# Patient Record
Sex: FEMALE | Race: WHITE | NOT HISPANIC OR LATINO | Employment: FULL TIME | URBAN - METROPOLITAN AREA
[De-identification: names, ages, dates, MRNs, and addresses within clinical notes are randomized per-mention and may not be internally consistent; named-entity substitution may affect disease eponyms.]

---

## 2017-02-11 RX ORDER — LEVOTHYROXINE SODIUM 100 UG/1
TABLET ORAL
Qty: 30 TABLET | Refills: 7 | Status: SHIPPED | OUTPATIENT
Start: 2017-02-11 | End: 2017-10-18 | Stop reason: SDUPTHER

## 2017-04-20 DIAGNOSIS — Z30.9 ENCOUNTER FOR CONTRACEPTIVE MANAGEMENT: ICD-10-CM

## 2017-05-08 ENCOUNTER — PATIENT MESSAGE (OUTPATIENT)
Dept: INTERNAL MEDICINE | Facility: CLINIC | Age: 52
End: 2017-05-08

## 2017-05-08 DIAGNOSIS — Z00.00 ANNUAL PHYSICAL EXAM: Primary | ICD-10-CM

## 2017-05-11 ENCOUNTER — LAB VISIT (OUTPATIENT)
Dept: LAB | Facility: HOSPITAL | Age: 52
End: 2017-05-11
Attending: INTERNAL MEDICINE
Payer: COMMERCIAL

## 2017-05-11 DIAGNOSIS — Z00.00 ANNUAL PHYSICAL EXAM: ICD-10-CM

## 2017-05-11 LAB
ALBUMIN SERPL BCP-MCNC: 3.2 G/DL
ALP SERPL-CCNC: 81 U/L
ALT SERPL W/O P-5'-P-CCNC: 14 U/L
ANION GAP SERPL CALC-SCNC: 10 MMOL/L
AST SERPL-CCNC: 18 U/L
BASOPHILS # BLD AUTO: 0.02 K/UL
BASOPHILS NFR BLD: 0.2 %
BILIRUB SERPL-MCNC: 0.5 MG/DL
BUN SERPL-MCNC: 16 MG/DL
CALCIUM SERPL-MCNC: 9 MG/DL
CHLORIDE SERPL-SCNC: 103 MMOL/L
CHOLEST/HDLC SERPL: 3.9 {RATIO}
CO2 SERPL-SCNC: 23 MMOL/L
CREAT SERPL-MCNC: 1.1 MG/DL
DIFFERENTIAL METHOD: ABNORMAL
EOSINOPHIL # BLD AUTO: 0.1 K/UL
EOSINOPHIL NFR BLD: 1 %
ERYTHROCYTE [DISTWIDTH] IN BLOOD BY AUTOMATED COUNT: 13.3 %
EST. GFR  (AFRICAN AMERICAN): >60 ML/MIN/1.73 M^2
EST. GFR  (NON AFRICAN AMERICAN): 58.3 ML/MIN/1.73 M^2
GLUCOSE SERPL-MCNC: 98 MG/DL
HCT VFR BLD AUTO: 37.3 %
HDL/CHOLESTEROL RATIO: 25.8 %
HDLC SERPL-MCNC: 182 MG/DL
HDLC SERPL-MCNC: 47 MG/DL
HGB BLD-MCNC: 12.2 G/DL
LDLC SERPL CALC-MCNC: 92 MG/DL
LYMPHOCYTES # BLD AUTO: 2.6 K/UL
LYMPHOCYTES NFR BLD: 30.4 %
MCH RBC QN AUTO: 29.5 PG
MCHC RBC AUTO-ENTMCNC: 32.7 %
MCV RBC AUTO: 90 FL
MONOCYTES # BLD AUTO: 0.6 K/UL
MONOCYTES NFR BLD: 6.9 %
NEUTROPHILS # BLD AUTO: 5.3 K/UL
NEUTROPHILS NFR BLD: 61.3 %
NONHDLC SERPL-MCNC: 135 MG/DL
PLATELET # BLD AUTO: 363 K/UL
PMV BLD AUTO: 8.9 FL
POTASSIUM SERPL-SCNC: 4.2 MMOL/L
PROT SERPL-MCNC: 7.6 G/DL
RBC # BLD AUTO: 4.14 M/UL
SODIUM SERPL-SCNC: 136 MMOL/L
T4 FREE SERPL-MCNC: 1.01 NG/DL
TRIGL SERPL-MCNC: 215 MG/DL
TSH SERPL DL<=0.005 MIU/L-ACNC: 4.3 UIU/ML
WBC # BLD AUTO: 8.59 K/UL

## 2017-05-11 PROCEDURE — 36415 COLL VENOUS BLD VENIPUNCTURE: CPT | Mod: PO

## 2017-05-11 PROCEDURE — 85025 COMPLETE CBC W/AUTO DIFF WBC: CPT

## 2017-05-11 PROCEDURE — 84439 ASSAY OF FREE THYROXINE: CPT

## 2017-05-11 PROCEDURE — 80061 LIPID PANEL: CPT

## 2017-05-11 PROCEDURE — 84443 ASSAY THYROID STIM HORMONE: CPT

## 2017-05-11 PROCEDURE — 80053 COMPREHEN METABOLIC PANEL: CPT

## 2017-05-15 ENCOUNTER — OFFICE VISIT (OUTPATIENT)
Dept: INTERNAL MEDICINE | Facility: CLINIC | Age: 52
End: 2017-05-15
Payer: COMMERCIAL

## 2017-05-15 VITALS
SYSTOLIC BLOOD PRESSURE: 136 MMHG | TEMPERATURE: 99 F | RESPIRATION RATE: 16 BRPM | HEART RATE: 92 BPM | WEIGHT: 226.19 LBS | BODY MASS INDEX: 36.35 KG/M2 | DIASTOLIC BLOOD PRESSURE: 84 MMHG | HEIGHT: 66 IN

## 2017-05-15 DIAGNOSIS — G89.29 CHRONIC RIGHT SHOULDER PAIN: ICD-10-CM

## 2017-05-15 DIAGNOSIS — M25.511 CHRONIC RIGHT SHOULDER PAIN: ICD-10-CM

## 2017-05-15 DIAGNOSIS — Z00.00 ANNUAL PHYSICAL EXAM: Primary | ICD-10-CM

## 2017-05-15 PROCEDURE — 99999 PR PBB SHADOW E&M-EST. PATIENT-LVL III: CPT | Mod: PBBFAC,,, | Performed by: INTERNAL MEDICINE

## 2017-05-15 PROCEDURE — 3079F DIAST BP 80-89 MM HG: CPT | Mod: S$GLB,,, | Performed by: INTERNAL MEDICINE

## 2017-05-15 PROCEDURE — 99396 PREV VISIT EST AGE 40-64: CPT | Mod: S$GLB,,, | Performed by: INTERNAL MEDICINE

## 2017-05-15 PROCEDURE — 3075F SYST BP GE 130 - 139MM HG: CPT | Mod: S$GLB,,, | Performed by: INTERNAL MEDICINE

## 2017-05-15 NOTE — PROGRESS NOTES
Subjective:       Patient ID: Mitzi Merlin Preston is a 51 y.o. female.    Chief Complaint: Annual Exam and Shoulder Pain (left shoulder pain)  HISTORY OF PRESENT ILLNESS:  A 51-year-old white female coming in for an annual   review and is feeling fine except for some joint complaints.  The patient sees   also gynecologist, Dr. Diego, an eye doctor, Dr. Nuno.  The patient has   complaints of pain in her right wrist, which she is diagnosed as tendinitis,   left shoulder, which bothers her when she does Pilates.  She has had a prior   fracture of the left wrist.  The patient has not seen Orthopedics about her   problems.  She also complains of sinus congestion.  She has allergies and has   occasional left ear stuffiness.    PHYSICAL EXAMINATION:  VITAL SIGNS:  Normal.  SKIN:  Fair and healthy.  HEENT:  Normal.  NECK:  Shows no adenopathy, thyroid enlargement or bruit.  CHEST:  Clear.  HEART:  There is no murmur or gallop.  ABDOMEN:  Nontender.  No organomegaly.  She is somewhat obese.  EXTREMITIES:  She has tenderness in her left shoulder on movement.  She has a   click.  She does not really have a defined rotator.  The left wrist, she is   tender in the wrist.   The right wrist is the one with the tendinitis and   currently she is not having any problems with that.    IMPRESSION:  1.  Normal physical exam except for her joint problems.  2.  Left shoulder pain, very likely this is some sort of internal disruption.  3.  Prior left wrist fracture with pain associated with that.  4.  Right wrist pain, which she says is tendinitis and appears as probably the   case.  So, I made arrangements for her to see Orthopedics.  She was upset with   her prior orthopedic visit when she had the fracture, wanted to see another   person who is an MD.  The patient had lab done prior to this visit, which was   reviewed with her, showed normal urinalysis, TSH is slightly elevated, otherwise   her lab is normal and her T4 is normal.  I  made no changes, told her we would   check her TSH, T4 the next time I see her.      JDC/HN  dd: 05/21/2017 12:15:42 (CDT)  td: 05/22/2017 02:05:49 (CDT)  Doc ID   #[id number]  Job ID #[id number]    CC:     [facility] [id number]  [facility]  Review of Systems   Constitutional: Negative.    HENT: Negative for congestion, hearing loss, sinus pressure, sneezing, sore throat and voice change.    Eyes: Negative for visual disturbance.   Respiratory: Negative for cough, chest tightness and shortness of breath.    Cardiovascular: Negative.  Negative for chest pain, palpitations and leg swelling.   Gastrointestinal: Negative.    Genitourinary: Negative for difficulty urinating, dysuria, flank pain, frequency, hematuria, menstrual problem, pelvic pain, urgency, vaginal bleeding and vaginal discharge.   Musculoskeletal: Positive for arthralgias and back pain. Negative for neck pain and neck stiffness.   Skin: Negative.    Neurological: Negative.  Negative for dizziness, seizures, light-headedness, numbness and headaches.   Psychiatric/Behavioral: Negative for agitation, behavioral problems, confusion and sleep disturbance. The patient is not nervous/anxious.        Objective:      Physical Exam   Constitutional: She is oriented to person, place, and time. She appears well-developed and well-nourished.   Eyes: EOM are normal. Pupils are equal, round, and reactive to light.   Neck: Normal range of motion. Neck supple. No JVD present. No thyromegaly present.   Cardiovascular: Normal rate, regular rhythm, normal heart sounds and intact distal pulses.  Exam reveals no gallop.    No murmur heard.  Pulmonary/Chest: Breath sounds normal. She has no wheezes. She has no rales.   Abdominal: Soft. Bowel sounds are normal. She exhibits no mass. There is no tenderness.   Musculoskeletal: Normal range of motion. She exhibits tenderness.   Lymphadenopathy:     She has no cervical adenopathy.   Neurological: She is alert and oriented to person, place, and time. She  has normal reflexes. No cranial nerve deficit.   Skin: No rash noted. No erythema.   Psychiatric: She has a normal mood and affect. Judgment normal.       Assessment:       1. Annual physical exam    2. Chronic right shoulder pain        Plan:

## 2017-05-27 ENCOUNTER — PATIENT MESSAGE (OUTPATIENT)
Dept: INTERNAL MEDICINE | Facility: CLINIC | Age: 52
End: 2017-05-27

## 2017-05-30 ENCOUNTER — HOSPITAL ENCOUNTER (OUTPATIENT)
Dept: RADIOLOGY | Facility: HOSPITAL | Age: 52
Discharge: HOME | End: 2017-05-30
Attending: FAMILY MEDICINE
Payer: COMMERCIAL

## 2017-05-30 ENCOUNTER — OFFICE VISIT (OUTPATIENT)
Dept: SPORTS MEDICINE | Facility: CLINIC | Age: 52
End: 2017-05-30
Payer: COMMERCIAL

## 2017-05-30 ENCOUNTER — PATIENT MESSAGE (OUTPATIENT)
Dept: INTERNAL MEDICINE | Facility: CLINIC | Age: 52
End: 2017-05-30

## 2017-05-30 VITALS — BODY MASS INDEX: 36.32 KG/M2 | WEIGHT: 226 LBS | HEIGHT: 66 IN

## 2017-05-30 DIAGNOSIS — M25.512 LEFT SHOULDER PAIN, UNSPECIFIED CHRONICITY: ICD-10-CM

## 2017-05-30 DIAGNOSIS — M12.812 ROTATOR CUFF ARTHROPATHY, LEFT: Primary | ICD-10-CM

## 2017-05-30 DIAGNOSIS — Z00.00 GENERAL MEDICAL EXAM: ICD-10-CM

## 2017-05-30 PROCEDURE — 73030 X-RAY EXAM OF SHOULDER: CPT | Mod: 26,LT,, | Performed by: RADIOLOGY

## 2017-05-30 PROCEDURE — 73030 X-RAY EXAM OF SHOULDER: CPT | Mod: TC,PO,LT

## 2017-05-30 PROCEDURE — 20610 DRAIN/INJ JOINT/BURSA W/O US: CPT | Mod: LT,S$GLB,, | Performed by: FAMILY MEDICINE

## 2017-05-30 PROCEDURE — 99243 OFF/OP CNSLTJ NEW/EST LOW 30: CPT | Mod: 25,S$GLB,, | Performed by: FAMILY MEDICINE

## 2017-05-30 PROCEDURE — 99999 PR PBB SHADOW E&M-EST. PATIENT-LVL III: CPT | Mod: PBBFAC,,, | Performed by: FAMILY MEDICINE

## 2017-05-30 RX ORDER — TRIAMCINOLONE ACETONIDE 40 MG/ML
40 INJECTION, SUSPENSION INTRA-ARTICULAR; INTRAMUSCULAR
Status: DISCONTINUED | OUTPATIENT
Start: 2017-05-30 | End: 2017-05-30 | Stop reason: HOSPADM

## 2017-05-30 RX ADMIN — TRIAMCINOLONE ACETONIDE 40 MG: 40 INJECTION, SUSPENSION INTRA-ARTICULAR; INTRAMUSCULAR at 09:05

## 2017-05-30 NOTE — LETTER
May 30, 2017      Sridhar Rogers MD  [address] [facility]  [place] [address]           [address] [address]  Phone: [phone]          Patient: Mitzi Merlin Preston   MR Number: [id number]   YOB: 1965   Date of Visit: 5/30/2017       Dear Dr. Sridhar Rogers:    Thank you for referring Hodan Hair to me for evaluation. Attached you will find relevant portions of my assessment and plan of care.    If you have questions, please do not hesitate to call me. I look forward to following Hodan Hair along with you.    Sincerely,    Alfonso Sarmiento MD    Enclosure  CC:  No Recipients    If you would like to receive this communication electronically, please contact [email or web address] or [phone] to request more information on [facility] access.    For providers and/or their staff who would like to refer a patient to [facility], please contact us through our one-stop-shop provider referral line, [facility], at [phone].    If you feel you have received this communication in error or would no longer like to receive these types of communications, please e-mail [email or web address]

## 2017-05-30 NOTE — PROGRESS NOTES
Mitzi Merlin Preston, a 51 y.o. female, is here for evaluation of LEFT shoulder pain.     This patient visit is a consult from the following provider: Sridhar Rogers MD  Today's office visit notes will be made available to the consulting/refering provider via the mail, a fax, and/or an in basket message through the electronic medical record.    New patient.     HISTORY OF PRESENT ILLNESS  Hand dominance, right    Location:  anterior and lateral, left  Onset:  Insidious, ~ March 2017   Palliative:     Relative rest   Oral analgesics (NSAIDs)   Ice   Provocative:    ADLs  Prior:  none  Progression:  plateau discomfort  Quality:    Throbbing  Shooting pain   Radiation:  none  Severity:  per nursing documentation  Timing:  intermittent with use  Trauma:  None known     Review of systems (ROS):  A 10+ review of systems was performed with pertinent positives and negatives noted above in the history of present illness. Other systems were negative unless otherwise specified.      PHYSICAL EXAMINATION  General:  The patient is alert and oriented x 3. Mood is pleasant. Observation of ears, eyes and nose reveal no gross abnormalities. HEENT: NCAT, sclera anicteric. Lungs: Respirations are equal and unlabored.     LEFT SHOULDER EXAMINATION     OBSERVATION:     Swelling  none  Deformity  none   Discoloration  none   Scapular winging none   Scars   none  Atrophy  none    TENDERNESS / CREPITUS (T/C):          T/C      T/C   Clavicle   -/-  SUPRAspinatus    -/-     AC Jt.    -/-  INFRAspinatus  -/-    SC Jt.    -/-  Deltoid    -/-      G. Tuberosity  -/-  LH BICEP groove  -/-   Acromion:  -/-  Midline Neck   -/-     Scapular Spine -/-  Trapezium   -/-   SMA Scapula  -/-  GH jt. line - post  -/-     Scapulothoracic  -/-         ROM:     Right shoulder   Left shoulder        AROM (PROM)   AROM (PROM)   FE    170° (175°)     160° (175°) *    ER at 0°    60°  (65°)    60°  (65°)*   ER at 90° ABD  90°  (90°)    90°  (90°)*   IR at 90°   ABD   NA  (40°)     NA  (40°)  *    IR (spine level)   T10     T10*    STRENGTH: (* = with pain) RIGHT SHOULDER  LEFT SHOULDER   SCAPTION   5/5    4/5*    IR    5/5    5/5   ER    5/5    4/5*   BICEPS   5/5    5/5   Deltoid    5/5    5/5     SIGNS:  Painful side       NEER   -   OJULESS        +    WALLACE   +   SPEEDS        +   DROP ARM   -   BELLY PRESS       -    X-Body ADD    +   LIFT-OFF        -   HORNBLOWERS      +              STABILITY TESTING   RIGHT SHOULDER  LEFT SHOULDER     Translation     Anterior up face    up face    Posterior up face   up face    Sulcus  < 10mm   < 10 mm     Signs   Apprehension   neg     neg       Relocation   no change    no change      Jerk test  neg    neg    EXTREMITY NEURO-VASCULAR EXAM    Sensation grossly intact to light touch all dermatomal regions.    DTR 2+ Biceps, Triceps, BR and Negative Kenys sign   Grossly intact motor function at Elbow, Wrist and Hand   Distal pulses radial and ulnar 2+, brisk cap refill, symmetric.      NECK:  Painless FROM and spinous processes non-tender. Negative Spurlings sign.       Other Findings:    ASSESSMENT & PLAN   Assessment:   #1 supraspinatus tendinosis, left  No evidence of osseous pathology  No evidence of neurologic pathology  No evidence of vascular pathology    Imaging studies reviewed:   X-ray shoulder, left 17.05    Plan:  We discussed options including:  #1 watchful waiting  #2 physical therapy aimed at:   RoM glenohumeral joint   Strengthening rotator cuff   Scapular stability  #3 injection therapy:   CSI SAB    Right,     Left,    CSI iaGH    Right,     Left,    orthobiologics   #4 MRI for further evaluation     The patient chooses #3 csi sab left    Pain management: handout given  Bracing:   Physical therapy:   Activity (e.g. sports, work) restrictions: as tolerated   school/vocation: psychologistfaina    We have seen her daughter ([place] do concussion) in the past    Follow up in x w  [place]?  A/e csi sab left  Effective-->hgPT, f/u in 12 w  Ineffective-->MRI shoulder  Should symptoms worsen or fail to resolve, consider:  Revisiting the above options

## 2017-05-30 NOTE — PROCEDURES
Large Joint Aspiration/Injection  Date/Time: 5/30/2017 9:11 AM  Performed by: STEPHANIE BANKS  Authorized by: STEPHANIE BANKS     Consent Done?:  Yes (Verbal)  Indications:  Pain  Procedure site marked: Yes    Timeout: Prior to procedure the correct patient, procedure, and site was verified      Location:  Shoulder  Site:  L subacromial bursa  Prep: Patient was prepped and draped in usual sterile fashion    Ultrasonic Guidance for needle placement: No  Needle size:  22 G  Approach:  Posterior  Medications:  40 mg triamcinolone acetonide 40 mg/mL  Patient tolerance:  Patient tolerated the procedure well with no immediate complications

## 2017-06-19 RX ORDER — AMLODIPINE AND BENAZEPRIL HYDROCHLORIDE 10; 20 MG/1; MG/1
CAPSULE ORAL
Qty: 30 CAPSULE | Refills: 11 | Status: SHIPPED | OUTPATIENT
Start: 2017-06-19 | End: 2018-06-28 | Stop reason: SDUPTHER

## 2017-07-17 ENCOUNTER — OFFICE VISIT (OUTPATIENT)
Dept: SPORTS MEDICINE | Facility: CLINIC | Age: 52
End: 2017-07-17
Payer: COMMERCIAL

## 2017-07-17 VITALS — WEIGHT: 226 LBS | TEMPERATURE: 99 F | BODY MASS INDEX: 36.32 KG/M2 | HEIGHT: 66 IN

## 2017-07-17 DIAGNOSIS — M25.512 CHRONIC LEFT SHOULDER PAIN: Primary | ICD-10-CM

## 2017-07-17 DIAGNOSIS — G89.29 CHRONIC LEFT SHOULDER PAIN: Primary | ICD-10-CM

## 2017-07-17 PROCEDURE — 99214 OFFICE O/P EST MOD 30 MIN: CPT | Mod: S$GLB,,, | Performed by: FAMILY MEDICINE

## 2017-07-17 PROCEDURE — 99999 PR PBB SHADOW E&M-EST. PATIENT-LVL III: CPT | Mod: PBBFAC,,, | Performed by: FAMILY MEDICINE

## 2017-07-17 NOTE — PROGRESS NOTES
Mitzi Merlin Preston, a 51 y.o. female, is here for evaluation of LEFT shoulder pain.     HISTORY OF PRESENT ILLNESS  Hand dominance, right    Location:  anterior and lateral, left  Onset:  Insidious, ~ March 2017   Palliative:     Relative rest   Oral analgesics (NSAIDs)   Ice    CSI, SAB< 05/30/17, good% improvement   Provocative:    ADLs  Prior:  none  Progression:  plateau discomfort  Quality:    Throbbing  Shooting pain   Radiation:  none  Severity:  per nursing documentation  Timing:  intermittent with use  Trauma:  None known     Review of systems (ROS):  A 10+ review of systems was performed with pertinent positives and negatives noted above in the history of present illness. Other systems were negative unless otherwise specified.      PHYSICAL EXAMINATION  General:  The patient is alert and oriented x 3. Mood is pleasant. Observation of ears, eyes and nose reveal no gross abnormalities. HEENT: NCAT, sclera anicteric. Lungs: Respirations are equal and unlabored.     LEFT SHOULDER EXAMINATION     OBSERVATION:     Swelling  none  Deformity  none   Discoloration  none   Scapular winging none   Scars   none  Atrophy  none    TENDERNESS / CREPITUS (T/C):          T/C      T/C   Clavicle   -/-  SUPRAspinatus    -/-     AC Jt.    -/-  INFRAspinatus  -/-    SC Jt.    -/-  Deltoid    -/-      G. Tuberosity  -/-  LH BICEP groove  -/-   Acromion:  -/-  Midline Neck   -/-     Scapular Spine -/-  Trapezium   -/-   SMA Scapula  -/-  GH jt. line - post  -/-     Scapulothoracic  -/-         ROM:     Right shoulder   Left shoulder        AROM (PROM)   AROM (PROM)   FE    170° (175°)     160° (175°) *    ER at 0°    60°  (65°)    60°  (65°)*   ER at 90° ABD  90°  (90°)    90°  (90°)*   IR at 90°  ABD   NA  (40°)     NA  (40°)  *    IR (spine level)   T10     T10*    STRENGTH: (* = with pain) RIGHT SHOULDER  LEFT  SHOULDER   SCAPTION   5/5 4/5*    IR    5/5 5/5   ER    5/5 4/5*   BICEPS   5/5 5/5   Deltoid    5/5 5/5     SIGNS:  Painful side       NEER   -   OJULESS        +    WALLACE   +   SPEEDS        +   DROP ARM   -   BELLY PRESS       -    X-Body ADD    +   LIFT-OFF        -   HORNBLOWERS      +              STABILITY TESTING   RIGHT SHOULDER  LEFT SHOULDER     Translation     Anterior up face    up face    Posterior up face   up face    Sulcus  < 10mm   < 10 mm     Signs   Apprehension   neg     neg       Relocation   no change    no change      Jerk test  neg    neg    EXTREMITY NEURO-VASCULAR EXAM    Sensation grossly intact to light touch all dermatomal regions.    DTR 2+ Biceps, Triceps, BR and Negative Kenys sign   Grossly intact motor function at Elbow, Wrist and Hand   Distal pulses radial and ulnar 2+, brisk cap refill, symmetric.      NECK:  Painless FROM and spinous processes non-tender. Negative Spurlings sign.       Other Findings:    ASSESSMENT & PLAN   Assessment:   #1 concern for glenoid labral tearing, left  No evidence of osseous pathology  No evidence of neurologic pathology  No evidence of vascular pathology    Imaging studies reviewed:   X-ray shoulder, left 17.05    Plan:  Given extreme dysfunction and discomfort, coupled with physical examination suggesting glenoid labral pathology, and with non-diagnostic x-ray imaging, we will obtain MRI arthrogram imaging for further evaluation of the glenoid labrum and associated soft tissue structures of the shoulder.    [We discussed options including:  #1 watchful waiting  #2 physical therapy aimed at:   RoM glenohumeral joint   Strengthening rotator cuff   Scapular stability  #3 injection therapy:   CSI SAB    Right,     Left, effective good%, didn't last long, repeat   CSI iaGH    Right,     Left,    orthobiologics   #4      The patient chooses #]    Pain management: handout given  Bracing:   Physical therapy:   Activity (e.g.  sports, work) restrictions: as tolerated   school/vocation: psychologistfaina    We have seen her daughter (ty concussion) in the past    Follow up after MRIa shoulder left  Should symptoms worsen or fail to resolve, consider:  Revisiting the above options

## 2017-07-24 RX ORDER — EZETIMIBE AND SIMVASTATIN 10; 10 MG/1; MG/1
1 TABLET ORAL NIGHTLY
Qty: 30 TABLET | Refills: 11 | Status: SHIPPED | OUTPATIENT
Start: 2017-07-24 | End: 2018-07-30 | Stop reason: SDUPTHER

## 2017-07-27 ENCOUNTER — TELEPHONE (OUTPATIENT)
Dept: SPORTS MEDICINE | Facility: CLINIC | Age: 52
End: 2017-07-27

## 2017-07-27 NOTE — TELEPHONE ENCOUNTER
----- Message from Beckie Bahena sent at 7/27/2017  1:55 PM CDT -----  Contact: self  Patient ask for a call in regards to rescheduling her MRI Patient can be reach at

## 2017-07-27 NOTE — TELEPHONE ENCOUNTER
Spoke c pt.     Pt reports shoulder is feeling much better after having to sleep on a hotel couch over the weekend. She reports her ROM has increased & Pilates class has been much easier.     Pt would still like to have imaging completed.     R/s MRIa & f/u visit.

## 2017-08-03 ENCOUNTER — TELEPHONE (OUTPATIENT)
Dept: RADIOLOGY | Facility: HOSPITAL | Age: 52
End: 2017-08-03

## 2017-08-04 ENCOUNTER — HOSPITAL ENCOUNTER (OUTPATIENT)
Dept: RADIOLOGY | Facility: HOSPITAL | Age: 52
Discharge: HOME | End: 2017-08-04
Attending: FAMILY MEDICINE
Payer: COMMERCIAL

## 2017-08-04 DIAGNOSIS — M25.512 CHRONIC LEFT SHOULDER PAIN: ICD-10-CM

## 2017-08-04 DIAGNOSIS — G89.29 CHRONIC LEFT SHOULDER PAIN: ICD-10-CM

## 2017-08-04 PROCEDURE — 73222 MRI JOINT UPR EXTREM W/DYE: CPT | Mod: TC,LT

## 2017-08-04 PROCEDURE — 73222 MRI JOINT UPR EXTREM W/DYE: CPT | Mod: 26,LT,, | Performed by: RADIOLOGY

## 2017-08-04 PROCEDURE — 25000003 PHARM REV CODE 250: Performed by: FAMILY MEDICINE

## 2017-08-04 PROCEDURE — 73040 CONTRAST X-RAY OF SHOULDER: CPT | Mod: 26,,, | Performed by: RADIOLOGY

## 2017-08-04 PROCEDURE — A9585 GADOBUTROL INJECTION: HCPCS | Performed by: FAMILY MEDICINE

## 2017-08-04 PROCEDURE — 25500020 PHARM REV CODE 255: Performed by: FAMILY MEDICINE

## 2017-08-04 PROCEDURE — 23350 INJECTION FOR SHOULDER X-RAY: CPT | Mod: TC

## 2017-08-04 PROCEDURE — 23350 INJECTION FOR SHOULDER X-RAY: CPT | Mod: ,,, | Performed by: RADIOLOGY

## 2017-08-04 RX ORDER — GADOBUTROL 604.72 MG/ML
1 INJECTION INTRAVENOUS
Status: COMPLETED | OUTPATIENT
Start: 2017-08-04 | End: 2017-08-04

## 2017-08-04 RX ORDER — LIDOCAINE HYDROCHLORIDE 10 MG/ML
1.5 INJECTION INFILTRATION; PERINEURAL ONCE
Status: COMPLETED | OUTPATIENT
Start: 2017-08-04 | End: 2017-08-04

## 2017-08-04 RX ADMIN — IOHEXOL 8 ML: 300 INJECTION, SOLUTION INTRAVENOUS at 03:08

## 2017-08-04 RX ADMIN — GADOBUTROL 1 ML: 604.72 INJECTION INTRAVENOUS at 03:08

## 2017-08-04 RX ADMIN — LIDOCAINE HYDROCHLORIDE 1.5 ML: 10 INJECTION, SOLUTION INFILTRATION; PERINEURAL at 03:08

## 2017-08-07 ENCOUNTER — OFFICE VISIT (OUTPATIENT)
Dept: SPORTS MEDICINE | Facility: CLINIC | Age: 52
End: 2017-08-07
Payer: COMMERCIAL

## 2017-08-07 VITALS — WEIGHT: 226 LBS | HEIGHT: 66 IN | TEMPERATURE: 99 F | BODY MASS INDEX: 36.32 KG/M2

## 2017-08-07 DIAGNOSIS — M12.812 ROTATOR CUFF ARTHROPATHY, LEFT: Primary | ICD-10-CM

## 2017-08-07 PROCEDURE — 97110 THERAPEUTIC EXERCISES: CPT | Mod: S$GLB,,, | Performed by: FAMILY MEDICINE

## 2017-08-07 PROCEDURE — 99214 OFFICE O/P EST MOD 30 MIN: CPT | Mod: 25,S$GLB,, | Performed by: FAMILY MEDICINE

## 2017-08-07 PROCEDURE — 99999 PR PBB SHADOW E&M-EST. PATIENT-LVL III: CPT | Mod: PBBFAC,,, | Performed by: FAMILY MEDICINE

## 2017-08-07 PROCEDURE — 3008F BODY MASS INDEX DOCD: CPT | Mod: S$GLB,,, | Performed by: FAMILY MEDICINE

## 2017-08-07 NOTE — PROGRESS NOTES
Mitzi Merlin Preston, a 51 y.o. female, is here for evaluation of LEFT shoulder pain.     HISTORY OF PRESENT ILLNESS  Hand dominance, right    Location:  anterior and lateral, left  Onset:  Insidious, ~ March 2017   Palliative:     Relative rest   Oral analgesics (NSAIDs)   Ice    CSI, SAB< 05/30/17, good% improvement   Provocative:    ADLs  Prior:  none  Progression:  plateau discomfort  Quality:    Throbbing  Shooting pain   Radiation:  none  Severity:  per nursing documentation  Timing:  intermittent with use  Trauma:  None known     Review of systems (ROS):  A 10+ review of systems was performed with pertinent positives and negatives noted above in the history of present illness. Other systems were negative unless otherwise specified.      PHYSICAL EXAMINATION  General:  The patient is alert and oriented x 3. Mood is pleasant. Observation of ears, eyes and nose reveal no gross abnormalities. HEENT: NCAT, sclera anicteric. Lungs: Respirations are equal and unlabored.     LEFT SHOULDER EXAMINATION     OBSERVATION:     Swelling  none  Deformity  none   Discoloration  none   Scapular winging none   Scars   none  Atrophy  none    TENDERNESS / CREPITUS (T/C):          T/C      T/C   Clavicle   -/-  SUPRAspinatus    -/-     AC Jt.    -/-  INFRAspinatus  -/-    SC Jt.    -/-  Deltoid    -/-      G. Tuberosity  -/-  LH BICEP groove  -/-   Acromion:  -/-  Midline Neck   -/-     Scapular Spine -/-  Trapezium   -/-   SMA Scapula  -/-  GH jt. line - post  -/-     Scapulothoracic  -/-         ROM:     Right shoulder   Left shoulder        AROM (PROM)   AROM (PROM)   FE    170° (175°)     160° (175°) *    ER at 0°    60°  (65°)    60°  (65°)*   ER at 90° ABD  90°  (90°)    90°  (90°)*   IR at 90°  ABD   NA  (40°)     NA  (40°)  *    IR (spine level)   T10     T10*    STRENGTH: (* = with pain) RIGHT SHOULDER  LEFT  SHOULDER   SCAPTION   5/5 4/5*    IR    5/5 5/5   ER    5/5 4/5*   BICEPS   5/5 5/5   Deltoid    5/5 5/5     SIGNS:  Painful side       NEER   -   OJULESS        +    WALLACE   +   SPEEDS        +   DROP ARM   -   BELLY PRESS       -    X-Body ADD    +   LIFT-OFF        -   HORNBLOWERS      +              STABILITY TESTING   RIGHT SHOULDER  LEFT SHOULDER     Translation     Anterior up face    up face    Posterior up face   up face    Sulcus  < 10mm   < 10 mm     Signs   Apprehension   neg     neg       Relocation   no change    no change      Jerk test  neg    neg    EXTREMITY NEURO-VASCULAR EXAM    Sensation grossly intact to light touch all dermatomal regions.    DTR 2+ Biceps, Triceps, BR and Negative Kenys sign   Grossly intact motor function at Elbow, Wrist and Hand   Distal pulses radial and ulnar 2+, brisk cap refill, symmetric.      NECK:  Painless FROM and spinous processes non-tender. Negative Spurlings sign.       Other Findings:    ASSESSMENT & PLAN   Assessment:   #1 supraspinatus tendinosis, left    No evidence of osseous pathology  No evidence of neurologic pathology  No evidence of vascular pathology    Imaging studies reviewed:   X-ray shoulder, left 17.05  MRIa shoulder, left 17.08    Plan:  We discussed options including:  #1 watchful waiting  #2 physical therapy aimed at:   RoM glenohumeral joint   Strengthening rotator cuff   Scapular stability  #3 injection therapy:   CSI SAB    Right,     Left, effective good%, didn't last long, repeat   CSI iaGH    Right,     Left,    orthobiologics   #4 consultation      The patient chooses #2    Pain management: handout given  Bracing:   Physical therapy: hgPT, handout given, begin as above  Activity (e.g. sports, work) restrictions: as tolerated   school/vocation: psychologistfaina    We have seen her daughter (ty concussion) in the past    Also the daughter of a physician    Follow up per pt  A/e hgPT  Should symptoms  "worsen or fail to resolve, consider:  Revisiting the above options    [address]246 HOME EXERCISE PROGRAM (HEP):  The patient was taught a homegoing physical therapy regimen as described above and based on the appropriate chapter of "The Sports Medicine Patient Advisor," by Ranjan Mason, b3991. The patient demonstrated understanding of the exercises and proper technique of their execution. This interaction took 15 minutes.       "

## 2017-10-22 RX ORDER — LEVOTHYROXINE SODIUM 100 UG/1
TABLET ORAL
Qty: 30 TABLET | Refills: 7 | Status: SHIPPED | OUTPATIENT
Start: 2017-10-22 | End: 2018-06-17 | Stop reason: SDUPTHER

## 2017-10-23 ENCOUNTER — PATIENT MESSAGE (OUTPATIENT)
Dept: INTERNAL MEDICINE | Facility: CLINIC | Age: 52
End: 2017-10-23

## 2017-11-01 ENCOUNTER — OFFICE VISIT (OUTPATIENT)
Dept: OBSTETRICS AND GYNECOLOGY | Facility: CLINIC | Age: 52
End: 2017-11-01
Payer: COMMERCIAL

## 2017-11-01 VITALS
SYSTOLIC BLOOD PRESSURE: 150 MMHG | BODY MASS INDEX: 36.95 KG/M2 | DIASTOLIC BLOOD PRESSURE: 80 MMHG | HEIGHT: 66 IN | WEIGHT: 229.94 LBS

## 2017-11-01 DIAGNOSIS — Z01.419 WELL WOMAN EXAM WITH ROUTINE GYNECOLOGICAL EXAM: Primary | ICD-10-CM

## 2017-11-01 DIAGNOSIS — N95.1 PERIMENOPAUSE: ICD-10-CM

## 2017-11-01 PROCEDURE — 99396 PREV VISIT EST AGE 40-64: CPT | Mod: S$GLB,,, | Performed by: OBSTETRICS & GYNECOLOGY

## 2017-11-01 PROCEDURE — 99999 PR PBB SHADOW E&M-EST. PATIENT-LVL III: CPT | Mod: PBBFAC,,, | Performed by: OBSTETRICS & GYNECOLOGY

## 2017-11-01 PROCEDURE — 88175 CYTOPATH C/V AUTO FLUID REDO: CPT

## 2017-11-01 RX ORDER — CLONIDINE HYDROCHLORIDE 0.2 MG/1
TABLET ORAL
Qty: 30 TABLET | Refills: 11 | Status: SHIPPED | OUTPATIENT
Start: 2017-11-01 | End: 2018-07-30

## 2017-11-01 NOTE — PROGRESS NOTES
Subjective:       Patient ID: Mitzi Merlin Preston is a 52 y.o. female.    Chief Complaint: Gynecologic Exam (she had an ablation and she has been having brownish blood and one month one day of blood.  the last two month no bleeding, but she has symptoms of a cycle.)    Perimenopause: Takes necon 1/50---will reduce to 1/35 to see of spotting decreases---next option is activella. Labs done 1 year ago  Has some vasomotor symptoms, mostly night sweats  Clonidine if OK with medical MD  Weight concerns---consider Contrave if OK with psychiatrist    HPI  Review of Systems   Gastrointestinal: Negative for abdominal distention, abdominal pain, constipation and nausea.   Genitourinary: Negative for dyspareunia, dysuria, genital sores, pelvic pain, vaginal bleeding and vaginal discharge.       Objective:      Physical Exam   Constitutional: She appears well-developed and well-nourished.   Pulmonary/Chest: Right breast exhibits no mass, no nipple discharge, no skin change and no tenderness. Left breast exhibits no mass, no nipple discharge, no skin change and no tenderness.   Abdominal: Soft. Bowel sounds are normal. She exhibits no distension and no mass. There is no tenderness. There is no rebound and no guarding. Hernia confirmed negative in the right inguinal area and confirmed negative in the left inguinal area.   Genitourinary: Rectum normal, vagina normal and uterus normal. No breast tenderness or discharge. There is no lesion on the right labia. There is no lesion on the left labia. Uterus is not fixed and not tender. Cervix exhibits no motion tenderness, no discharge and no friability. Right adnexum displays no mass, no tenderness and no fullness. Left adnexum displays no mass, no tenderness and no fullness. No tenderness in the vagina. No vaginal discharge found.   Lymphadenopathy:        Right: No inguinal adenopathy present.        Left: No inguinal adenopathy present.       Assessment:       1. Well woman exam  with routine gynecological exam    2. BMI 37.0-37.9, adult        Plan:         annual gyn visit; pap and mammogram; continue OCP

## 2017-11-08 ENCOUNTER — TELEPHONE (OUTPATIENT)
Dept: OBSTETRICS AND GYNECOLOGY | Facility: CLINIC | Age: 52
End: 2017-11-08

## 2017-11-13 ENCOUNTER — PATIENT MESSAGE (OUTPATIENT)
Dept: ADMINISTRATIVE | Facility: OTHER | Age: 52
End: 2017-11-13

## 2018-01-05 RX ORDER — TIZANIDINE HYDROCHLORIDE 4 MG/1
CAPSULE, GELATIN COATED ORAL
Qty: 60 CAPSULE | Refills: 12 | Status: SHIPPED | OUTPATIENT
Start: 2018-01-05 | End: 2018-07-30 | Stop reason: SDUPTHER

## 2018-02-19 ENCOUNTER — PATIENT MESSAGE (OUTPATIENT)
Dept: OBSTETRICS AND GYNECOLOGY | Facility: CLINIC | Age: 53
End: 2018-02-19

## 2018-02-20 ENCOUNTER — TELEPHONE (OUTPATIENT)
Dept: OBSTETRICS AND GYNECOLOGY | Facility: CLINIC | Age: 53
End: 2018-02-20

## 2018-02-20 NOTE — TELEPHONE ENCOUNTER
Dr. Diego,     For about the last 3 months I have been feeling distracted and unmotivated.  The only medication changes have been the addition of the Clonidine and the change in birth control.  I saw my psychiatrist in December and asked him if he could increase my ADHD medication.  He said doing so could make me more anxious.  Recently I participated in a webinar series for women with ADHD.  In a couple of the lectures, the speakers talked about the relationship between estrogen and ADHD symptoms.  Do you think that the reduction of my birth control pills could be the reason for my cloudy thoughts, lack of motivation and clumsiness I have been experiencing?     Hodan Hair

## 2018-02-21 ENCOUNTER — PATIENT MESSAGE (OUTPATIENT)
Dept: OBSTETRICS AND GYNECOLOGY | Facility: CLINIC | Age: 53
End: 2018-02-21

## 2018-02-21 NOTE — TELEPHONE ENCOUNTER
The patient know that the medication change could possibly effect anything else on its own or it could affect other medications.  She can stop the clonidine for now and see if things are better after a couple of weeks.  Also, we can increase her birth control pill strength to her previous dose either now or after seeing if the discontinuation of clonidine makes a difference.

## 2018-06-17 DIAGNOSIS — Z00.00 ANNUAL PHYSICAL EXAM: Primary | ICD-10-CM

## 2018-06-18 RX ORDER — LEVOTHYROXINE SODIUM 100 UG/1
TABLET ORAL
Qty: 30 TABLET | Refills: 0 | Status: SHIPPED | OUTPATIENT
Start: 2018-06-18 | End: 2018-07-30 | Stop reason: SDUPTHER

## 2018-06-28 RX ORDER — AMLODIPINE AND BENAZEPRIL HYDROCHLORIDE 10; 20 MG/1; MG/1
CAPSULE ORAL
Qty: 30 CAPSULE | Refills: 11 | Status: SHIPPED | OUTPATIENT
Start: 2018-06-28 | End: 2018-07-30

## 2018-07-16 ENCOUNTER — PATIENT MESSAGE (OUTPATIENT)
Dept: INTERNAL MEDICINE | Facility: CLINIC | Age: 53
End: 2018-07-16

## 2018-07-19 ENCOUNTER — PATIENT MESSAGE (OUTPATIENT)
Dept: INTERNAL MEDICINE | Facility: CLINIC | Age: 53
End: 2018-07-19

## 2018-07-26 ENCOUNTER — HOSPITAL ENCOUNTER (OUTPATIENT)
Dept: RADIOLOGY | Facility: HOSPITAL | Age: 53
Discharge: HOME | End: 2018-07-26
Attending: OBSTETRICS & GYNECOLOGY
Payer: COMMERCIAL

## 2018-07-26 VITALS — BODY MASS INDEX: 36.96 KG/M2 | HEIGHT: 66 IN | WEIGHT: 230 LBS

## 2018-07-26 DIAGNOSIS — Z12.39 BREAST CANCER SCREENING: ICD-10-CM

## 2018-07-26 DIAGNOSIS — Z01.419 WELL WOMAN EXAM WITH ROUTINE GYNECOLOGICAL EXAM: ICD-10-CM

## 2018-07-26 PROCEDURE — 77063 BREAST TOMOSYNTHESIS BI: CPT | Mod: 26,,, | Performed by: RADIOLOGY

## 2018-07-26 PROCEDURE — 77063 BREAST TOMOSYNTHESIS BI: CPT | Mod: TC,PO

## 2018-07-26 PROCEDURE — 77067 SCR MAMMO BI INCL CAD: CPT | Mod: 26,,, | Performed by: RADIOLOGY

## 2018-07-27 ENCOUNTER — LAB VISIT (OUTPATIENT)
Dept: LAB | Facility: HOSPITAL | Age: 53
End: 2018-07-27
Attending: INTERNAL MEDICINE
Payer: COMMERCIAL

## 2018-07-27 DIAGNOSIS — Z00.00 ANNUAL PHYSICAL EXAM: ICD-10-CM

## 2018-07-27 LAB
ALBUMIN SERPL BCP-MCNC: 3.6 G/DL
ALP SERPL-CCNC: 97 U/L
ALT SERPL W/O P-5'-P-CCNC: 20 U/L
ANION GAP SERPL CALC-SCNC: 11 MMOL/L
AST SERPL-CCNC: 23 U/L
BASOPHILS # BLD AUTO: 0.03 K/UL
BASOPHILS NFR BLD: 0.4 %
BILIRUB SERPL-MCNC: 0.5 MG/DL
BUN SERPL-MCNC: 17 MG/DL
CALCIUM SERPL-MCNC: 9.5 MG/DL
CHLORIDE SERPL-SCNC: 102 MMOL/L
CHOLEST SERPL-MCNC: 219 MG/DL
CHOLEST/HDLC SERPL: 3.7 {RATIO}
CO2 SERPL-SCNC: 24 MMOL/L
CREAT SERPL-MCNC: 1 MG/DL
DIFFERENTIAL METHOD: ABNORMAL
EOSINOPHIL # BLD AUTO: 0 K/UL
EOSINOPHIL NFR BLD: 0.2 %
ERYTHROCYTE [DISTWIDTH] IN BLOOD BY AUTOMATED COUNT: 13.3 %
EST. GFR  (AFRICAN AMERICAN): >60 ML/MIN/1.73 M^2
EST. GFR  (NON AFRICAN AMERICAN): >60 ML/MIN/1.73 M^2
GLUCOSE SERPL-MCNC: 102 MG/DL
HCT VFR BLD AUTO: 40.2 %
HDLC SERPL-MCNC: 59 MG/DL
HDLC SERPL: 26.9 %
HGB BLD-MCNC: 12.7 G/DL
IMM GRANULOCYTES # BLD AUTO: 0.02 K/UL
IMM GRANULOCYTES NFR BLD AUTO: 0.2 %
LDLC SERPL CALC-MCNC: 126.2 MG/DL
LYMPHOCYTES # BLD AUTO: 3.1 K/UL
LYMPHOCYTES NFR BLD: 36.7 %
MCH RBC QN AUTO: 28.9 PG
MCHC RBC AUTO-ENTMCNC: 31.6 G/DL
MCV RBC AUTO: 91 FL
MONOCYTES # BLD AUTO: 0.6 K/UL
MONOCYTES NFR BLD: 6.9 %
NEUTROPHILS # BLD AUTO: 4.8 K/UL
NEUTROPHILS NFR BLD: 55.6 %
NONHDLC SERPL-MCNC: 160 MG/DL
NRBC BLD-RTO: 0 /100 WBC
PLATELET # BLD AUTO: 392 K/UL
PMV BLD AUTO: 8.7 FL
POTASSIUM SERPL-SCNC: 4.4 MMOL/L
PROT SERPL-MCNC: 7.9 G/DL
RBC # BLD AUTO: 4.4 M/UL
SODIUM SERPL-SCNC: 137 MMOL/L
T4 FREE SERPL-MCNC: 1.1 NG/DL
TRIGL SERPL-MCNC: 169 MG/DL
TSH SERPL DL<=0.005 MIU/L-ACNC: 5.14 UIU/ML
WBC # BLD AUTO: 8.54 K/UL

## 2018-07-27 PROCEDURE — 84443 ASSAY THYROID STIM HORMONE: CPT

## 2018-07-27 PROCEDURE — 80061 LIPID PANEL: CPT

## 2018-07-27 PROCEDURE — 80053 COMPREHEN METABOLIC PANEL: CPT

## 2018-07-27 PROCEDURE — 36415 COLL VENOUS BLD VENIPUNCTURE: CPT | Mod: PO

## 2018-07-27 PROCEDURE — 85025 COMPLETE CBC W/AUTO DIFF WBC: CPT

## 2018-07-27 PROCEDURE — 84439 ASSAY OF FREE THYROXINE: CPT

## 2018-07-30 ENCOUNTER — OFFICE VISIT (OUTPATIENT)
Dept: INTERNAL MEDICINE | Facility: CLINIC | Age: 53
End: 2018-07-30
Payer: COMMERCIAL

## 2018-07-30 ENCOUNTER — LAB VISIT (OUTPATIENT)
Dept: LAB | Facility: HOSPITAL | Age: 53
End: 2018-07-30
Attending: INTERNAL MEDICINE
Payer: COMMERCIAL

## 2018-07-30 VITALS
DIASTOLIC BLOOD PRESSURE: 90 MMHG | HEIGHT: 66 IN | SYSTOLIC BLOOD PRESSURE: 180 MMHG | BODY MASS INDEX: 38.44 KG/M2 | WEIGHT: 239.19 LBS | TEMPERATURE: 100 F | RESPIRATION RATE: 18 BRPM | HEART RATE: 100 BPM

## 2018-07-30 DIAGNOSIS — Z00.00 ANNUAL PHYSICAL EXAM: ICD-10-CM

## 2018-07-30 DIAGNOSIS — G47.00 INSOMNIA, UNSPECIFIED TYPE: ICD-10-CM

## 2018-07-30 DIAGNOSIS — I10 ESSENTIAL HYPERTENSION: Primary | ICD-10-CM

## 2018-07-30 DIAGNOSIS — J30.89 ALLERGIC RHINITIS CAUSED BY MOLD: ICD-10-CM

## 2018-07-30 DIAGNOSIS — R82.90 ABNORMAL URINALYSIS: ICD-10-CM

## 2018-07-30 PROCEDURE — 3077F SYST BP >= 140 MM HG: CPT | Mod: CPTII,S$GLB,, | Performed by: INTERNAL MEDICINE

## 2018-07-30 PROCEDURE — 3080F DIAST BP >= 90 MM HG: CPT | Mod: CPTII,S$GLB,, | Performed by: INTERNAL MEDICINE

## 2018-07-30 PROCEDURE — 99396 PREV VISIT EST AGE 40-64: CPT | Mod: S$GLB,,, | Performed by: INTERNAL MEDICINE

## 2018-07-30 PROCEDURE — 99999 PR PBB SHADOW E&M-EST. PATIENT-LVL III: CPT | Mod: PBBFAC,,, | Performed by: INTERNAL MEDICINE

## 2018-07-30 PROCEDURE — 87086 URINE CULTURE/COLONY COUNT: CPT

## 2018-07-30 RX ORDER — AMLODIPINE AND BENAZEPRIL HYDROCHLORIDE 10; 40 MG/1; MG/1
1 CAPSULE ORAL DAILY
Qty: 90 CAPSULE | Refills: 3 | Status: SHIPPED | OUTPATIENT
Start: 2018-07-30 | End: 2019-07-16

## 2018-07-30 RX ORDER — PROMETHAZINE HYDROCHLORIDE AND CODEINE PHOSPHATE 6.25; 1 MG/5ML; MG/5ML
SOLUTION ORAL
Refills: 0 | COMMUNITY
Start: 2018-07-02 | End: 2019-03-21 | Stop reason: ALTCHOICE

## 2018-07-30 RX ORDER — TIZANIDINE HYDROCHLORIDE 4 MG/1
2 CAPSULE, GELATIN COATED ORAL NIGHTLY
Qty: 180 CAPSULE | Refills: 3 | Status: SHIPPED | OUTPATIENT
Start: 2018-07-30 | End: 2019-09-13 | Stop reason: SDUPTHER

## 2018-07-30 RX ORDER — LEVOTHYROXINE SODIUM 112 UG/1
112 TABLET ORAL DAILY
Qty: 90 TABLET | Refills: 3 | Status: SHIPPED | OUTPATIENT
Start: 2018-07-30 | End: 2019-07-16 | Stop reason: SDUPTHER

## 2018-07-30 RX ORDER — PREDNISONE 20 MG/1
TABLET ORAL
Refills: 0 | COMMUNITY
Start: 2018-07-02 | End: 2019-09-16

## 2018-07-30 RX ORDER — LISDEXAMFETAMINE DIMESYLATE 50 MG/1
CAPSULE ORAL
Refills: 0 | COMMUNITY
Start: 2018-07-11 | End: 2022-07-15 | Stop reason: DRUGHIGH

## 2018-07-30 RX ORDER — EZETIMIBE AND SIMVASTATIN 10; 10 MG/1; MG/1
1 TABLET ORAL NIGHTLY
Qty: 90 TABLET | Refills: 3 | Status: SHIPPED | OUTPATIENT
Start: 2018-07-30 | End: 2019-07-16 | Stop reason: SDUPTHER

## 2018-07-31 NOTE — PROGRESS NOTES
Subjective:       Patient ID: Mitzi Merlin Preston is a 52 y.o. female.    Chief Complaint: Annual Exam  HISTORY OF PRESENT ILLNESS:  A 52-year-old white female patient well known to   me, coming in for annual exam.  The patient has been living in a house that has   apparently got mold in the air conditioning system and she is having allergic   reactions both skin and sinus.  She also complains over the last three months   having problems with her right thumb near the PIP joint.  She has a bird, which   is a Macaw that apparently bit her there she thinks.  She saw a friend who I   believe is an orthopedist who told her it was not any orthopedic problem.  She   sees gynecologist Johnathon, psychiatrist whose name I do not have and [facility] for   ENT.  The patient came in, her blood pressure was hypertensive.  She said that   when she has been to her psychiatrist, it is generally high there.  She has not   monitored it at home.  She is on a hypertensive medication.  She has no symptoms   related to it.    PHYSICAL EXAMINATION:  VITAL SIGNS:  Normal except for her elevated blood pressure, it was 180/90,   repeat 178/88.  SKIN:  Fair.  The area by her first PIP joint on her right thumb is actually a   skin abnormality that blanches with pressure.  It appears to be a little bit   concave.  There is no inflammatory or substance to it and I presume it is an   injury.  I do not see anything deeper than that.  HEENT:  Shows nasal congestion.  No other abnormality.  NECK:  She is tender in the anterior cervical node area and she actually has   some adenopathy on both sides, more prominent on the left than the right, but   her oral cavity and throat looked alright.  I told her I presume that is from   the mold contamination in her house and her sinus problems related to it.  She   has no evidence of a bacterial infection, however.  CHEST:  Clear.  HEART:  There is no murmur or gallop.  ABDOMEN:  Nontender without any  organomegaly.  EXTREMITIES:  Show normal muscles, joints, pulses.  NEUROLOGIC:  Normal.    IMPRESSION:  1.  Hypertension.  I have increased her benazepril-amlodipine combination.  2.  She had an elevated TSH on her blood work, felt no thyroid enlargement.  I   increased her dose of Synthroid to 0.112 and we will recheck that in a few   months.  3.  The injury she has to her first PIP joint on the right hand appears to be   just a skin injury with no substance.  4.  Cervical adenopathy, probably related to her air conditioning contamination   sinus allergy.  5.  Sleep problems.  6.  Psychiatric counseling on medication.  I will see her again in one year.      JMILLIE/HN  dd: 07/30/2018 23:27:41 (CDT)  td: 07/31/2018 11:56:49 (CDT)  Doc ID   #[id number]  Job ID #[id number]    CC:     [facility] [id number]  HPI  Review of Systems   Constitutional: Negative.  Negative for activity change and unexpected weight change.   HENT: Positive for rhinorrhea. Negative for congestion, hearing loss, sinus pressure, sneezing, sore throat, trouble swallowing and voice change.    Eyes: Negative for discharge and visual disturbance.   Respiratory: Positive for wheezing. Negative for cough, chest tightness and shortness of breath.    Cardiovascular: Negative.  Negative for chest pain, palpitations and leg swelling.   Gastrointestinal: Negative.  Negative for blood in stool, constipation, diarrhea and vomiting.   Endocrine: Positive for polyuria. Negative for polydipsia.   Genitourinary: Negative for difficulty urinating, dysuria, flank pain, frequency, hematuria, menstrual problem, pelvic pain, urgency, vaginal bleeding and vaginal discharge.   Musculoskeletal: Positive for arthralgias. Negative for joint swelling, neck pain and neck stiffness.   Skin: Negative.    Neurological: Positive for headaches. Negative for dizziness, seizures, weakness, light-headedness and numbness.   Psychiatric/Behavioral: Negative for agitation, behavioral problems, confusion,  dysphoric mood and sleep disturbance. The patient is not nervous/anxious.        Objective:      Physical Exam   Constitutional: She is oriented to person, place, and time. She appears well-developed and well-nourished.   Eyes: EOM are normal. Pupils are equal, round, and reactive to light.   Neck: Normal range of motion. Neck supple. No JVD present. No thyromegaly present.   Cardiovascular: Normal rate, regular rhythm, normal heart sounds and intact distal pulses.  Exam reveals no gallop.    No murmur heard.  Pulmonary/Chest: Breath sounds normal. She has no wheezes. She has no rales.   Abdominal: Soft. Bowel sounds are normal. She exhibits no mass. There is no tenderness.   Musculoskeletal: Normal range of motion.   Lymphadenopathy:     She has no cervical adenopathy.   Neurological: She is alert and oriented to person, place, and time. She has normal reflexes. No cranial nerve deficit.   Skin: No rash noted. No erythema.   Psychiatric: She has a normal mood and affect. Judgment normal.       Assessment:       1. Essential hypertension    2. Annual physical exam    3. Allergic rhinitis caused by mold    4. Insomnia, unspecified type    5. Abnormal urinalysis        Plan:

## 2018-08-01 LAB
BACTERIA UR CULT: NORMAL
BACTERIA UR CULT: NORMAL

## 2018-08-02 ENCOUNTER — TELEPHONE (OUTPATIENT)
Dept: INTERNAL MEDICINE | Facility: CLINIC | Age: 53
End: 2018-08-02

## 2018-09-28 RX ORDER — NORETHINDRONE AND ETHINYL ESTRADIOL 1 MG-35MCG
KIT ORAL
Qty: 28 TABLET | Refills: 11 | Status: SHIPPED | OUTPATIENT
Start: 2018-09-28 | End: 2019-09-01 | Stop reason: SDUPTHER

## 2018-10-31 ENCOUNTER — PES CALL (OUTPATIENT)
Dept: ADMINISTRATIVE | Facility: CLINIC | Age: 53
End: 2018-10-31

## 2019-03-21 ENCOUNTER — TELEPHONE (OUTPATIENT)
Dept: INTERNAL MEDICINE | Facility: CLINIC | Age: 54
End: 2019-03-21

## 2019-03-21 ENCOUNTER — OFFICE VISIT (OUTPATIENT)
Dept: INTERNAL MEDICINE | Facility: CLINIC | Age: 54
End: 2019-03-21
Payer: COMMERCIAL

## 2019-03-21 VITALS
SYSTOLIC BLOOD PRESSURE: 140 MMHG | WEIGHT: 237 LBS | HEIGHT: 66 IN | DIASTOLIC BLOOD PRESSURE: 64 MMHG | BODY MASS INDEX: 38.09 KG/M2 | HEART RATE: 100 BPM | TEMPERATURE: 101 F

## 2019-03-21 DIAGNOSIS — J11.1 FLU SYNDROME: Primary | ICD-10-CM

## 2019-03-21 LAB
INFLUENZA A, MOLECULAR: POSITIVE
INFLUENZA B, MOLECULAR: NEGATIVE
SPECIMEN SOURCE: ABNORMAL

## 2019-03-21 PROCEDURE — 3077F PR MOST RECENT SYSTOLIC BLOOD PRESSURE >= 140 MM HG: ICD-10-PCS | Mod: CPTII,S$GLB,, | Performed by: INTERNAL MEDICINE

## 2019-03-21 PROCEDURE — 3008F BODY MASS INDEX DOCD: CPT | Mod: CPTII,S$GLB,, | Performed by: INTERNAL MEDICINE

## 2019-03-21 PROCEDURE — 99214 OFFICE O/P EST MOD 30 MIN: CPT | Mod: S$GLB,,, | Performed by: INTERNAL MEDICINE

## 2019-03-21 PROCEDURE — 87502 INFLUENZA DNA AMP PROBE: CPT

## 2019-03-21 PROCEDURE — 99999 PR PBB SHADOW E&M-EST. PATIENT-LVL III: ICD-10-PCS | Mod: PBBFAC,,, | Performed by: INTERNAL MEDICINE

## 2019-03-21 PROCEDURE — 3078F PR MOST RECENT DIASTOLIC BLOOD PRESSURE < 80 MM HG: ICD-10-PCS | Mod: CPTII,S$GLB,, | Performed by: INTERNAL MEDICINE

## 2019-03-21 PROCEDURE — 3078F DIAST BP <80 MM HG: CPT | Mod: CPTII,S$GLB,, | Performed by: INTERNAL MEDICINE

## 2019-03-21 PROCEDURE — 99999 PR PBB SHADOW E&M-EST. PATIENT-LVL III: CPT | Mod: PBBFAC,,, | Performed by: INTERNAL MEDICINE

## 2019-03-21 PROCEDURE — 3077F SYST BP >= 140 MM HG: CPT | Mod: CPTII,S$GLB,, | Performed by: INTERNAL MEDICINE

## 2019-03-21 PROCEDURE — 99214 PR OFFICE/OUTPT VISIT, EST, LEVL IV, 30-39 MIN: ICD-10-PCS | Mod: S$GLB,,, | Performed by: INTERNAL MEDICINE

## 2019-03-21 PROCEDURE — 3008F PR BODY MASS INDEX (BMI) DOCUMENTED: ICD-10-PCS | Mod: CPTII,S$GLB,, | Performed by: INTERNAL MEDICINE

## 2019-03-21 RX ORDER — OSELTAMIVIR PHOSPHATE 75 MG/1
CAPSULE ORAL
COMMUNITY
Start: 2019-03-20 | End: 2019-07-16

## 2019-03-21 NOTE — TELEPHONE ENCOUNTER
I called her with results . Positive flu.    Her dad rx's tamilflu 75mg qd #10.    Dr carrington told me to tell her bid for 5 days. She just started this am.  I also told her fluids, tylenol or advil prn and light meals, soup good.    dr carrington heard conversation and agreed with all.

## 2019-03-21 NOTE — PROGRESS NOTES
Subjective:       Patient ID: Mitzi Merlin Preston is a 53 y.o. female.    Chief Complaint: Cough; Excessive Sweating; Generalized Body Aches; Sore Throat; and Diarrhea  HISTORY OF PRESENT ILLNESS:  A 53-year-old white female well known to me, is   complaining of onset two days ago of headache, cough, sweating, fever, chills,   muscle aches and backache.  She got a prescription for Tamiflu from her father   who is a urologist, but has not picked it up yet.  The patient came in with a   temperature of 100.6.  She is not aware of anyone who has had infection around   her, but does have contact with the public.    PHYSICAL EXAMINATION:  VITAL SIGNS:  Normal except her temperature of 100.6.  SKIN:  Fair, healthy, but slightly diaphoretic.  CHEST:  Clear.  HEENT:  Clear.  NECK:  Shows no stiffness or adenopathy.    IMPRESSION:  Probable flu.  She was instructed to fill her prescription.  Flu   test was done and was positive for influenza A.  She was told to drink plenty of   fluids.  No additional treatment was needed.      JMILLIE/HN  dd: 03/24/2019 20:24:45 (CDT)  td: 03/25/2019 03:19:46 (CDT)  Doc ID   #[id number]  Job ID #[id number]    CC:     Dict [id number]  HPI  Review of Systems   Constitutional: Positive for chills, fatigue and fever.   HENT: Positive for congestion, postnasal drip and sore throat. Negative for hearing loss, sinus pressure, sneezing and voice change.    Eyes: Negative for visual disturbance.   Respiratory: Positive for cough. Negative for chest tightness and shortness of breath.    Cardiovascular: Negative.  Negative for chest pain, palpitations and leg swelling.   Gastrointestinal: Negative.    Genitourinary: Negative for difficulty urinating, dysuria, flank pain, frequency, hematuria, menstrual problem, pelvic pain, urgency, vaginal bleeding and vaginal discharge.   Musculoskeletal: Positive for myalgias. Negative for neck pain and neck stiffness.   Skin: Negative.    Neurological: Positive for headaches.  Negative for dizziness, seizures, light-headedness and numbness.   Psychiatric/Behavioral: Negative for agitation, behavioral problems, confusion and sleep disturbance. The patient is not nervous/anxious.        Objective:      Physical Exam   Constitutional: She is oriented to person, place, and time. She appears well-developed and well-nourished.   Eyes: EOM are normal. Pupils are equal, round, and reactive to light.   Neck: Normal range of motion. Neck supple. No JVD present. No thyromegaly present.   Cardiovascular: Normal rate, regular rhythm, normal heart sounds and intact distal pulses. Exam reveals no gallop.   No murmur heard.  Pulmonary/Chest: Breath sounds normal. She has no wheezes. She has no rales.   Abdominal: Soft. Bowel sounds are normal. She exhibits no mass. There is no tenderness.   Musculoskeletal: Normal range of motion.   Lymphadenopathy:     She has no cervical adenopathy.   Neurological: She is alert and oriented to person, place, and time. She has normal reflexes. No cranial nerve deficit.   Skin: No rash noted. No erythema.   Psychiatric: She has a normal mood and affect. Judgment normal.       Assessment:       1. Flu syndrome        Plan:

## 2019-05-14 DIAGNOSIS — Z12.11 COLON CANCER SCREENING: ICD-10-CM

## 2019-07-16 RX ORDER — LEVOTHYROXINE SODIUM 112 UG/1
112 TABLET ORAL DAILY
Qty: 90 TABLET | Refills: 3 | Status: SHIPPED | OUTPATIENT
Start: 2019-07-16 | End: 2020-08-06

## 2019-07-16 RX ORDER — EZETIMIBE AND SIMVASTATIN 10; 10 MG/1; MG/1
1 TABLET ORAL NIGHTLY
Qty: 90 TABLET | Refills: 3 | Status: SHIPPED | OUTPATIENT
Start: 2019-07-16 | End: 2020-05-22 | Stop reason: SDUPTHER

## 2019-07-16 RX ORDER — AMLODIPINE AND BENAZEPRIL HYDROCHLORIDE 10; 40 MG/1; MG/1
CAPSULE ORAL
Qty: 90 CAPSULE | Refills: 3 | OUTPATIENT
Start: 2019-07-16

## 2019-07-16 RX ORDER — LEVOTHYROXINE SODIUM 112 UG/1
112 TABLET ORAL DAILY
Qty: 90 TABLET | Refills: 3 | OUTPATIENT
Start: 2019-07-16

## 2019-07-16 RX ORDER — EZETIMIBE AND SIMVASTATIN 10; 10 MG/1; MG/1
TABLET ORAL
Qty: 90 TABLET | Refills: 3 | OUTPATIENT
Start: 2019-07-16

## 2019-07-16 RX ORDER — AMLODIPINE AND VALSARTAN 10; 160 MG/1; MG/1
1 TABLET ORAL NIGHTLY
Qty: 90 TABLET | Refills: 3 | Status: SHIPPED | OUTPATIENT
Start: 2019-07-16 | End: 2020-07-27

## 2019-08-21 ENCOUNTER — TELEPHONE (OUTPATIENT)
Dept: INTERNAL MEDICINE | Facility: CLINIC | Age: 54
End: 2019-08-21

## 2019-08-22 ENCOUNTER — TELEPHONE (OUTPATIENT)
Dept: INTERNAL MEDICINE | Facility: CLINIC | Age: 54
End: 2019-08-22

## 2019-08-22 DIAGNOSIS — Z00.00 ANNUAL PHYSICAL EXAM: Primary | ICD-10-CM

## 2019-08-30 ENCOUNTER — LAB VISIT (OUTPATIENT)
Dept: LAB | Facility: HOSPITAL | Age: 54
End: 2019-08-30
Attending: INTERNAL MEDICINE
Payer: COMMERCIAL

## 2019-08-30 DIAGNOSIS — Z00.00 ANNUAL PHYSICAL EXAM: ICD-10-CM

## 2019-08-30 LAB
ALBUMIN SERPL BCP-MCNC: 3.5 G/DL (ref 3.5–5.2)
ALP SERPL-CCNC: 82 U/L (ref 55–135)
ALT SERPL W/O P-5'-P-CCNC: 18 U/L (ref 10–44)
ANION GAP SERPL CALC-SCNC: 9 MMOL/L (ref 8–16)
AST SERPL-CCNC: 22 U/L (ref 10–40)
BASOPHILS # BLD AUTO: 0.03 K/UL (ref 0–0.2)
BASOPHILS NFR BLD: 0.3 % (ref 0–1.9)
BILIRUB SERPL-MCNC: 0.4 MG/DL (ref 0.1–1)
BUN SERPL-MCNC: 10 MG/DL (ref 6–20)
CALCIUM SERPL-MCNC: 8.7 MG/DL (ref 8.7–10.5)
CHLORIDE SERPL-SCNC: 106 MMOL/L (ref 95–110)
CHOLEST SERPL-MCNC: 210 MG/DL (ref 120–199)
CHOLEST/HDLC SERPL: 3.9 {RATIO} (ref 2–5)
CO2 SERPL-SCNC: 24 MMOL/L (ref 23–29)
CREAT SERPL-MCNC: 0.9 MG/DL (ref 0.5–1.4)
DIFFERENTIAL METHOD: ABNORMAL
EOSINOPHIL # BLD AUTO: 0.1 K/UL (ref 0–0.5)
EOSINOPHIL NFR BLD: 0.8 % (ref 0–8)
ERYTHROCYTE [DISTWIDTH] IN BLOOD BY AUTOMATED COUNT: 12.8 % (ref 11.5–14.5)
EST. GFR  (AFRICAN AMERICAN): >60 ML/MIN/1.73 M^2
EST. GFR  (NON AFRICAN AMERICAN): >60 ML/MIN/1.73 M^2
GLUCOSE SERPL-MCNC: 103 MG/DL (ref 70–110)
HCT VFR BLD AUTO: 38.5 % (ref 37–48.5)
HDLC SERPL-MCNC: 54 MG/DL (ref 40–75)
HDLC SERPL: 25.7 % (ref 20–50)
HGB BLD-MCNC: 12.3 G/DL (ref 12–16)
IMM GRANULOCYTES # BLD AUTO: 0.03 K/UL (ref 0–0.04)
IMM GRANULOCYTES NFR BLD AUTO: 0.3 % (ref 0–0.5)
LDLC SERPL CALC-MCNC: 111.8 MG/DL (ref 63–159)
LYMPHOCYTES # BLD AUTO: 2.7 K/UL (ref 1–4.8)
LYMPHOCYTES NFR BLD: 28.7 % (ref 18–48)
MCH RBC QN AUTO: 29.1 PG (ref 27–31)
MCHC RBC AUTO-ENTMCNC: 31.9 G/DL (ref 32–36)
MCV RBC AUTO: 91 FL (ref 82–98)
MONOCYTES # BLD AUTO: 0.5 K/UL (ref 0.3–1)
MONOCYTES NFR BLD: 5.6 % (ref 4–15)
NEUTROPHILS # BLD AUTO: 6.1 K/UL (ref 1.8–7.7)
NEUTROPHILS NFR BLD: 64.3 % (ref 38–73)
NONHDLC SERPL-MCNC: 156 MG/DL
NRBC BLD-RTO: 0 /100 WBC
PLATELET # BLD AUTO: 362 K/UL (ref 150–350)
PMV BLD AUTO: 9 FL (ref 9.2–12.9)
POTASSIUM SERPL-SCNC: 4.4 MMOL/L (ref 3.5–5.1)
PROT SERPL-MCNC: 7.3 G/DL (ref 6–8.4)
RBC # BLD AUTO: 4.23 M/UL (ref 4–5.4)
SODIUM SERPL-SCNC: 139 MMOL/L (ref 136–145)
T4 FREE SERPL-MCNC: 0.95 NG/DL (ref 0.71–1.51)
TRIGL SERPL-MCNC: 221 MG/DL (ref 30–150)
TSH SERPL DL<=0.005 MIU/L-ACNC: 2.69 UIU/ML (ref 0.4–4)
WBC # BLD AUTO: 9.47 K/UL (ref 3.9–12.7)

## 2019-08-30 PROCEDURE — 85025 COMPLETE CBC W/AUTO DIFF WBC: CPT

## 2019-08-30 PROCEDURE — 84443 ASSAY THYROID STIM HORMONE: CPT

## 2019-08-30 PROCEDURE — 80061 LIPID PANEL: CPT

## 2019-08-30 PROCEDURE — 80053 COMPREHEN METABOLIC PANEL: CPT

## 2019-08-30 PROCEDURE — 36415 COLL VENOUS BLD VENIPUNCTURE: CPT | Mod: PO

## 2019-08-30 PROCEDURE — 84439 ASSAY OF FREE THYROXINE: CPT

## 2019-09-01 RX ORDER — NORETHINDRONE AND ETHINYL ESTRADIOL 1 MG-35MCG
KIT ORAL
Qty: 84 TABLET | Refills: 3 | Status: SHIPPED | OUTPATIENT
Start: 2019-09-01 | End: 2020-02-21

## 2019-09-13 RX ORDER — TIZANIDINE HYDROCHLORIDE 4 MG/1
CAPSULE, GELATIN COATED ORAL
Qty: 180 CAPSULE | Refills: 3 | Status: SHIPPED | OUTPATIENT
Start: 2019-09-13 | End: 2020-12-07

## 2019-09-16 ENCOUNTER — OFFICE VISIT (OUTPATIENT)
Dept: INTERNAL MEDICINE | Facility: CLINIC | Age: 54
End: 2019-09-16
Payer: COMMERCIAL

## 2019-09-16 ENCOUNTER — LAB VISIT (OUTPATIENT)
Dept: LAB | Facility: HOSPITAL | Age: 54
End: 2019-09-16
Attending: INTERNAL MEDICINE
Payer: COMMERCIAL

## 2019-09-16 VITALS
HEART RATE: 96 BPM | TEMPERATURE: 99 F | SYSTOLIC BLOOD PRESSURE: 182 MMHG | HEIGHT: 65 IN | WEIGHT: 242 LBS | DIASTOLIC BLOOD PRESSURE: 78 MMHG | RESPIRATION RATE: 16 BRPM | BODY MASS INDEX: 40.32 KG/M2

## 2019-09-16 DIAGNOSIS — J30.89 ALLERGIC RHINITIS CAUSED BY MOLD: ICD-10-CM

## 2019-09-16 DIAGNOSIS — I10 ESSENTIAL HYPERTENSION: ICD-10-CM

## 2019-09-16 DIAGNOSIS — F90.2 ATTENTION DEFICIT HYPERACTIVITY DISORDER (ADHD), COMBINED TYPE: ICD-10-CM

## 2019-09-16 DIAGNOSIS — R00.0 TACHYCARDIA: ICD-10-CM

## 2019-09-16 DIAGNOSIS — I10 ESSENTIAL HYPERTENSION: Primary | ICD-10-CM

## 2019-09-16 DIAGNOSIS — K21.00 GASTROESOPHAGEAL REFLUX DISEASE WITH ESOPHAGITIS: ICD-10-CM

## 2019-09-16 DIAGNOSIS — F41.9 ANXIETY: ICD-10-CM

## 2019-09-16 DIAGNOSIS — G47.00 INSOMNIA, UNSPECIFIED TYPE: ICD-10-CM

## 2019-09-16 DIAGNOSIS — Z00.00 ANNUAL PHYSICAL EXAM: ICD-10-CM

## 2019-09-16 DIAGNOSIS — G25.81 RESTLESS LEG SYNDROME: ICD-10-CM

## 2019-09-16 LAB
BASOPHILS # BLD AUTO: 0.03 K/UL (ref 0–0.2)
BASOPHILS NFR BLD: 0.3 % (ref 0–1.9)
DIFFERENTIAL METHOD: ABNORMAL
EOSINOPHIL # BLD AUTO: 0 K/UL (ref 0–0.5)
EOSINOPHIL NFR BLD: 0.2 % (ref 0–8)
ERYTHROCYTE [DISTWIDTH] IN BLOOD BY AUTOMATED COUNT: 13 % (ref 11.5–14.5)
ERYTHROCYTE [SEDIMENTATION RATE] IN BLOOD BY WESTERGREN METHOD: 45 MM/HR (ref 0–36)
HCT VFR BLD AUTO: 42.9 % (ref 37–48.5)
HGB BLD-MCNC: 13.6 G/DL (ref 12–16)
IMM GRANULOCYTES # BLD AUTO: 0.03 K/UL (ref 0–0.04)
IMM GRANULOCYTES NFR BLD AUTO: 0.3 % (ref 0–0.5)
LYMPHOCYTES # BLD AUTO: 2.5 K/UL (ref 1–4.8)
LYMPHOCYTES NFR BLD: 28.5 % (ref 18–48)
MCH RBC QN AUTO: 29.6 PG (ref 27–31)
MCHC RBC AUTO-ENTMCNC: 31.7 G/DL (ref 32–36)
MCV RBC AUTO: 93 FL (ref 82–98)
MONOCYTES # BLD AUTO: 0.5 K/UL (ref 0.3–1)
MONOCYTES NFR BLD: 5.4 % (ref 4–15)
NEUTROPHILS # BLD AUTO: 5.8 K/UL (ref 1.8–7.7)
NEUTROPHILS NFR BLD: 65.3 % (ref 38–73)
NRBC BLD-RTO: 0 /100 WBC
PLATELET # BLD AUTO: 398 K/UL (ref 150–350)
PMV BLD AUTO: 8.6 FL (ref 9.2–12.9)
RBC # BLD AUTO: 4.6 M/UL (ref 4–5.4)
WBC # BLD AUTO: 8.84 K/UL (ref 3.9–12.7)

## 2019-09-16 PROCEDURE — 99999 PR PBB SHADOW E&M-EST. PATIENT-LVL III: ICD-10-PCS | Mod: PBBFAC,,, | Performed by: INTERNAL MEDICINE

## 2019-09-16 PROCEDURE — 85379 FIBRIN DEGRADATION QUANT: CPT

## 2019-09-16 PROCEDURE — 99999 PR PBB SHADOW E&M-EST. PATIENT-LVL III: CPT | Mod: PBBFAC,,, | Performed by: INTERNAL MEDICINE

## 2019-09-16 PROCEDURE — 3077F SYST BP >= 140 MM HG: CPT | Mod: CPTII,S$GLB,, | Performed by: INTERNAL MEDICINE

## 2019-09-16 PROCEDURE — 3077F PR MOST RECENT SYSTOLIC BLOOD PRESSURE >= 140 MM HG: ICD-10-PCS | Mod: CPTII,S$GLB,, | Performed by: INTERNAL MEDICINE

## 2019-09-16 PROCEDURE — 99396 PREV VISIT EST AGE 40-64: CPT | Mod: S$GLB,,, | Performed by: INTERNAL MEDICINE

## 2019-09-16 PROCEDURE — 3078F PR MOST RECENT DIASTOLIC BLOOD PRESSURE < 80 MM HG: ICD-10-PCS | Mod: CPTII,S$GLB,, | Performed by: INTERNAL MEDICINE

## 2019-09-16 PROCEDURE — 93010 EKG 12-LEAD: ICD-10-PCS | Mod: S$GLB,,, | Performed by: INTERNAL MEDICINE

## 2019-09-16 PROCEDURE — 85025 COMPLETE CBC W/AUTO DIFF WBC: CPT

## 2019-09-16 PROCEDURE — 84484 ASSAY OF TROPONIN QUANT: CPT

## 2019-09-16 PROCEDURE — 93005 EKG 12-LEAD: ICD-10-PCS | Mod: S$GLB,,, | Performed by: INTERNAL MEDICINE

## 2019-09-16 PROCEDURE — 93010 ELECTROCARDIOGRAM REPORT: CPT | Mod: S$GLB,,, | Performed by: INTERNAL MEDICINE

## 2019-09-16 PROCEDURE — 36415 COLL VENOUS BLD VENIPUNCTURE: CPT | Mod: PO

## 2019-09-16 PROCEDURE — 3078F DIAST BP <80 MM HG: CPT | Mod: CPTII,S$GLB,, | Performed by: INTERNAL MEDICINE

## 2019-09-16 PROCEDURE — 85652 RBC SED RATE AUTOMATED: CPT

## 2019-09-16 PROCEDURE — 99396 PR PREVENTIVE VISIT,EST,40-64: ICD-10-PCS | Mod: S$GLB,,, | Performed by: INTERNAL MEDICINE

## 2019-09-16 PROCEDURE — 93005 ELECTROCARDIOGRAM TRACING: CPT | Mod: S$GLB,,, | Performed by: INTERNAL MEDICINE

## 2019-09-16 RX ORDER — PANTOPRAZOLE SODIUM 40 MG/1
40 TABLET, DELAYED RELEASE ORAL DAILY
Qty: 90 TABLET | Refills: 1 | Status: SHIPPED | OUTPATIENT
Start: 2019-09-16 | End: 2020-07-09 | Stop reason: SDUPTHER

## 2019-09-16 RX ORDER — VALSARTAN 160 MG/1
160 TABLET ORAL DAILY
Qty: 30 TABLET | Refills: 3 | Status: SHIPPED | OUTPATIENT
Start: 2019-09-16 | End: 2020-01-14 | Stop reason: SDUPTHER

## 2019-09-16 NOTE — PROGRESS NOTES
Subjective:       Patient ID: Mitzi Merlin Preston is a 54 y.o. female.    Chief Complaint: Annual Exam (notices heart racing at the time, did on way here.  menopause? )  HISTORY OF PRESENT ILLNESS:  A 54-year-old white female patient well known to   me, coming in for annual review and has several problems.  She noticed two days   prior to this that she was doing her normal stretching activities and had onset   of racing heart and blood pressure elevation.  She had had no change in her   medication except for her blood pressure medicine.  I have switched her from   lisinopril to valsartan so that she is currently on 160 of valsartan along with   10 mg of amlodipine.  The patient's blood pressure since that time was 140 to   150/70 to 80.  The patient also had acid reflux.  She has gotten off her Zantac   because of concerns about the medication.  The patient is otherwise feeling   well.  She does have some decline in her visual acuity.  She sees eye doctor   regularly and has had a change in her correction.  She also has sinus allergy,   occasional wheezing, some joint aches, particularly knees.  She has sleep   problems.  She has had restless leg, seems to be better currently.    PHYSICAL EXAMINATION:  VITAL SIGNS:  Blood pressure 182/78 on entry, weight is 242, which is about 6   pounds higher than it was a little over a year ago, temperature 99.4.  SKIN:  Fair and healthy.  HEENT:  Clear.  NECK:  Shows no adenopathy, thyroid enlargement, or bruit.  CHEST:  Clear.  HEART:  There is no murmur or gallop.  ABDOMEN:  Obese, nontender, no organomegaly.  EXTREMITIES:  Show normal muscles, joints, pulses.  NEUROLOGIC:  Appears normal.    LABORATORY DATA:  She had lab done prior to this.  I did some additional lab   because of her history of chest pain.  All that lab was reviewed with her.  Her   troponin was normal.  D-dimer was normal.  Sed rate 45.  CBC normal.  Urinalysis   normal.  Thyroid function also normal.  Her  lipid panel showed a cholesterol of   210, triglyceride 221.  Glucose is 103.  Chemistries otherwise normal.  She had   a chart review done as well.  She is due a tetanus toxoid, but she thinks she   had that.  She does not want to get the shingles.    IMPRESSION:  1.  Cardiac arrhythmia.  I did an EKG on her, which is normal.  2.  Hypertension with a change in her medicine.  This showed no cause or   problems that she had.  It did not occur with the change in the dosing.  She has   had no problems since.  I have added because her blood pressures even at home   are somewhat elevated.  I added another Valsartan 160 to add to her current   amlodipine, valsartan 10/160.  I told to take the new one in the morning, the   mixed medication in the evening.  3.  I have told her to switch to Protonix.  I gave her a prescription for that   for her GERD, get off the Zantac.  4.  She is going to work harder on exercise, weight loss program.  She actually   does regular exercise, and I will see her again in one to two weeks.      JOEY/PAUL  dd: 10/01/2019 00:53:16 (CDT)  td: 10/01/2019 19:28:35 (CDT)  Doc ID   #[id number]  Job ID #[id number]    CC:     Dict [id number]  [facility]  Review of Systems   Constitutional: Negative.  Negative for activity change and unexpected weight change.   HENT: Positive for rhinorrhea. Negative for congestion, hearing loss, sinus pressure, sneezing, sore throat, trouble swallowing and voice change.    Eyes: Positive for visual disturbance. Negative for discharge.   Respiratory: Positive for wheezing. Negative for cough, chest tightness and shortness of breath.    Cardiovascular: Positive for palpitations. Negative for chest pain and leg swelling.   Gastrointestinal: Negative.  Negative for blood in stool, constipation, diarrhea and vomiting.   Endocrine: Negative for polydipsia and polyuria.   Genitourinary: Negative for difficulty urinating, dysuria, flank pain, frequency, hematuria, menstrual problem, pelvic pain,  urgency, vaginal bleeding and vaginal discharge.   Musculoskeletal: Positive for arthralgias. Negative for joint swelling, neck pain and neck stiffness.   Skin: Negative.    Neurological: Positive for headaches. Negative for dizziness, seizures, weakness, light-headedness and numbness.   Psychiatric/Behavioral: Positive for agitation and sleep disturbance. Negative for behavioral problems, confusion and dysphoric mood. The patient is not nervous/anxious.        Objective:      Physical Exam   Constitutional: She is oriented to person, place, and time. She appears well-developed and well-nourished.   Eyes: Pupils are equal, round, and reactive to light. EOM are normal.   Neck: Normal range of motion. Neck supple. No JVD present. No thyromegaly present.   Cardiovascular: Normal rate, regular rhythm, normal heart sounds and intact distal pulses. Exam reveals no gallop.   No murmur heard.  Pulmonary/Chest: Breath sounds normal. She has no wheezes. She has no rales.   Abdominal: Soft. Bowel sounds are normal. She exhibits no mass. There is no tenderness.   Musculoskeletal: Normal range of motion.   Lymphadenopathy:     She has no cervical adenopathy.   Neurological: She is alert and oriented to person, place, and time. She has normal reflexes. No cranial nerve deficit.   Skin: No rash noted. No erythema.   Psychiatric: She has a normal mood and affect. Judgment normal.       Assessment:       1. Essential hypertension    2. Annual physical exam    3. Tachycardia    4. Insomnia, unspecified type    5. Allergic rhinitis caused by mold    6. Restless leg syndrome    7. Attention deficit hyperactivity disorder (ADHD), combined type    8. Anxiety    9. Gastroesophageal reflux disease with esophagitis        Plan:

## 2019-09-17 LAB
D DIMER PPP IA.FEU-MCNC: 0.59 MG/L FEU
TROPONIN I SERPL DL<=0.01 NG/ML-MCNC: <0.006 NG/ML (ref 0–0.03)

## 2019-09-19 ENCOUNTER — PATIENT MESSAGE (OUTPATIENT)
Dept: INTERNAL MEDICINE | Facility: CLINIC | Age: 54
End: 2019-09-19

## 2019-09-20 NOTE — TELEPHONE ENCOUNTER
----- Message from Sridhar Rogers MD sent at 9/20/2019  4:03 PM CDT -----  Her lab and ekg are ok.

## 2019-10-09 ENCOUNTER — PATIENT MESSAGE (OUTPATIENT)
Dept: INTERNAL MEDICINE | Facility: CLINIC | Age: 54
End: 2019-10-09

## 2019-10-10 RX ORDER — HYDROCHLOROTHIAZIDE 12.5 MG/1
12.5 TABLET ORAL EVERY MORNING
Qty: 30 TABLET | Refills: 11 | Status: SHIPPED | OUTPATIENT
Start: 2019-10-10 | End: 2020-04-20

## 2019-10-11 NOTE — TELEPHONE ENCOUNTER
Will add hctz 12.5mg and call in bp in one week.  Does she have any symptoms of HBP or dizziness on standing

## 2019-10-22 ENCOUNTER — OFFICE VISIT (OUTPATIENT)
Dept: INTERNAL MEDICINE | Facility: CLINIC | Age: 54
End: 2019-10-22
Payer: COMMERCIAL

## 2019-10-22 VITALS
DIASTOLIC BLOOD PRESSURE: 60 MMHG | SYSTOLIC BLOOD PRESSURE: 150 MMHG | TEMPERATURE: 99 F | HEIGHT: 65 IN | HEART RATE: 102 BPM | WEIGHT: 240.31 LBS | BODY MASS INDEX: 40.04 KG/M2 | RESPIRATION RATE: 18 BRPM

## 2019-10-22 DIAGNOSIS — R59.0 CERVICAL ADENOPATHY: ICD-10-CM

## 2019-10-22 DIAGNOSIS — R42 VERTIGO: ICD-10-CM

## 2019-10-22 DIAGNOSIS — I10 ESSENTIAL HYPERTENSION: ICD-10-CM

## 2019-10-22 DIAGNOSIS — B96.89 BACTERIAL SINUSITIS: Primary | ICD-10-CM

## 2019-10-22 DIAGNOSIS — J32.9 BACTERIAL SINUSITIS: Primary | ICD-10-CM

## 2019-10-22 LAB
INFLUENZA A, MOLECULAR: NEGATIVE
INFLUENZA B, MOLECULAR: NEGATIVE
SPECIMEN SOURCE: NORMAL

## 2019-10-22 PROCEDURE — 87502 INFLUENZA DNA AMP PROBE: CPT

## 2019-10-22 PROCEDURE — 96372 PR INJECTION,THERAP/PROPH/DIAG2ST, IM OR SUBCUT: ICD-10-PCS | Mod: S$GLB,,, | Performed by: INTERNAL MEDICINE

## 2019-10-22 PROCEDURE — 3078F PR MOST RECENT DIASTOLIC BLOOD PRESSURE < 80 MM HG: ICD-10-PCS | Mod: CPTII,S$GLB,, | Performed by: INTERNAL MEDICINE

## 2019-10-22 PROCEDURE — 3078F DIAST BP <80 MM HG: CPT | Mod: CPTII,S$GLB,, | Performed by: INTERNAL MEDICINE

## 2019-10-22 PROCEDURE — 3008F BODY MASS INDEX DOCD: CPT | Mod: CPTII,S$GLB,, | Performed by: INTERNAL MEDICINE

## 2019-10-22 PROCEDURE — 3077F PR MOST RECENT SYSTOLIC BLOOD PRESSURE >= 140 MM HG: ICD-10-PCS | Mod: CPTII,S$GLB,, | Performed by: INTERNAL MEDICINE

## 2019-10-22 PROCEDURE — 99214 OFFICE O/P EST MOD 30 MIN: CPT | Mod: 25,S$GLB,, | Performed by: INTERNAL MEDICINE

## 2019-10-22 PROCEDURE — 99999 PR PBB SHADOW E&M-EST. PATIENT-LVL III: CPT | Mod: PBBFAC,,, | Performed by: INTERNAL MEDICINE

## 2019-10-22 PROCEDURE — 3008F PR BODY MASS INDEX (BMI) DOCUMENTED: ICD-10-PCS | Mod: CPTII,S$GLB,, | Performed by: INTERNAL MEDICINE

## 2019-10-22 PROCEDURE — 99999 PR PBB SHADOW E&M-EST. PATIENT-LVL III: ICD-10-PCS | Mod: PBBFAC,,, | Performed by: INTERNAL MEDICINE

## 2019-10-22 PROCEDURE — 3077F SYST BP >= 140 MM HG: CPT | Mod: CPTII,S$GLB,, | Performed by: INTERNAL MEDICINE

## 2019-10-22 PROCEDURE — 96372 THER/PROPH/DIAG INJ SC/IM: CPT | Mod: S$GLB,,, | Performed by: INTERNAL MEDICINE

## 2019-10-22 PROCEDURE — 99214 PR OFFICE/OUTPT VISIT, EST, LEVL IV, 30-39 MIN: ICD-10-PCS | Mod: 25,S$GLB,, | Performed by: INTERNAL MEDICINE

## 2019-10-22 RX ORDER — TRIAMCINOLONE ACETONIDE 40 MG/ML
40 INJECTION, SUSPENSION INTRA-ARTICULAR; INTRAMUSCULAR ONCE
Status: COMPLETED | OUTPATIENT
Start: 2019-10-22 | End: 2019-10-22

## 2019-10-22 RX ORDER — CLONIDINE HYDROCHLORIDE 0.1 MG/1
0.1 TABLET ORAL NIGHTLY
Qty: 30 TABLET | Refills: 1 | Status: SHIPPED | OUTPATIENT
Start: 2019-10-22 | End: 2019-11-13 | Stop reason: SDUPTHER

## 2019-10-22 RX ORDER — AZITHROMYCIN 250 MG/1
TABLET, FILM COATED ORAL
Qty: 6 TABLET | Refills: 0 | Status: SHIPPED | OUTPATIENT
Start: 2019-10-22 | End: 2020-03-23

## 2019-10-22 RX ADMIN — TRIAMCINOLONE ACETONIDE 40 MG: 40 INJECTION, SUSPENSION INTRA-ARTICULAR; INTRAMUSCULAR at 12:10

## 2019-10-22 NOTE — PATIENT INSTRUCTIONS
Take over the counter Mucinex 1 pill twice daily  Over the counter Saline Nasal Spray (ex: Ocean brand)  Several times a day- 1 squirt in each nostril.  Push Fluids/ water      Continue to monitor bp and come in for visit with measurements in one week

## 2019-10-23 ENCOUNTER — TELEPHONE (OUTPATIENT)
Dept: INTERNAL MEDICINE | Facility: CLINIC | Age: 54
End: 2019-10-23

## 2019-10-31 ENCOUNTER — OFFICE VISIT (OUTPATIENT)
Dept: INTERNAL MEDICINE | Facility: CLINIC | Age: 54
End: 2019-10-31
Payer: COMMERCIAL

## 2019-10-31 VITALS
WEIGHT: 242.31 LBS | HEIGHT: 65 IN | DIASTOLIC BLOOD PRESSURE: 64 MMHG | TEMPERATURE: 98 F | SYSTOLIC BLOOD PRESSURE: 132 MMHG | BODY MASS INDEX: 40.37 KG/M2 | HEART RATE: 79 BPM

## 2019-10-31 DIAGNOSIS — R42 VERTIGO: ICD-10-CM

## 2019-10-31 DIAGNOSIS — F41.9 ANXIETY: ICD-10-CM

## 2019-10-31 DIAGNOSIS — I10 ESSENTIAL HYPERTENSION: Primary | ICD-10-CM

## 2019-10-31 DIAGNOSIS — B96.89 BACTERIAL SINUSITIS: ICD-10-CM

## 2019-10-31 DIAGNOSIS — F90.2 ATTENTION DEFICIT HYPERACTIVITY DISORDER (ADHD), COMBINED TYPE: ICD-10-CM

## 2019-10-31 DIAGNOSIS — R59.0 CERVICAL ADENOPATHY: ICD-10-CM

## 2019-10-31 DIAGNOSIS — J32.9 BACTERIAL SINUSITIS: ICD-10-CM

## 2019-10-31 DIAGNOSIS — G25.81 RESTLESS LEG SYNDROME: ICD-10-CM

## 2019-10-31 PROCEDURE — 90714 TD VACC NO PRESV 7 YRS+ IM: CPT | Mod: S$GLB,,, | Performed by: INTERNAL MEDICINE

## 2019-10-31 PROCEDURE — 90471 IMMUNIZATION ADMIN: CPT | Mod: S$GLB,,, | Performed by: INTERNAL MEDICINE

## 2019-10-31 PROCEDURE — 99214 PR OFFICE/OUTPT VISIT, EST, LEVL IV, 30-39 MIN: ICD-10-PCS | Mod: 25,S$GLB,, | Performed by: INTERNAL MEDICINE

## 2019-10-31 PROCEDURE — 90686 IIV4 VACC NO PRSV 0.5 ML IM: CPT | Mod: S$GLB,,, | Performed by: INTERNAL MEDICINE

## 2019-10-31 PROCEDURE — 3075F SYST BP GE 130 - 139MM HG: CPT | Mod: CPTII,S$GLB,, | Performed by: INTERNAL MEDICINE

## 2019-10-31 PROCEDURE — 3008F PR BODY MASS INDEX (BMI) DOCUMENTED: ICD-10-PCS | Mod: CPTII,S$GLB,, | Performed by: INTERNAL MEDICINE

## 2019-10-31 PROCEDURE — 99214 OFFICE O/P EST MOD 30 MIN: CPT | Mod: 25,S$GLB,, | Performed by: INTERNAL MEDICINE

## 2019-10-31 PROCEDURE — 90686 FLU VACCINE (QUAD) GREATER THAN OR EQUAL TO 3YO PRESERVATIVE FREE IM: ICD-10-PCS | Mod: S$GLB,,, | Performed by: INTERNAL MEDICINE

## 2019-10-31 PROCEDURE — 3078F DIAST BP <80 MM HG: CPT | Mod: CPTII,S$GLB,, | Performed by: INTERNAL MEDICINE

## 2019-10-31 PROCEDURE — 90471 FLU VACCINE (QUAD) GREATER THAN OR EQUAL TO 3YO PRESERVATIVE FREE IM: ICD-10-PCS | Mod: S$GLB,,, | Performed by: INTERNAL MEDICINE

## 2019-10-31 PROCEDURE — 90714 TD VACCINE GREATER THAN OR EQUAL TO 7YO PRESERVATIVE FREE IM: ICD-10-PCS | Mod: S$GLB,,, | Performed by: INTERNAL MEDICINE

## 2019-10-31 PROCEDURE — 3075F PR MOST RECENT SYSTOLIC BLOOD PRESS GE 130-139MM HG: ICD-10-PCS | Mod: CPTII,S$GLB,, | Performed by: INTERNAL MEDICINE

## 2019-10-31 PROCEDURE — 90472 IMMUNIZATION ADMIN EACH ADD: CPT | Mod: S$GLB,,, | Performed by: INTERNAL MEDICINE

## 2019-10-31 PROCEDURE — 3078F PR MOST RECENT DIASTOLIC BLOOD PRESSURE < 80 MM HG: ICD-10-PCS | Mod: CPTII,S$GLB,, | Performed by: INTERNAL MEDICINE

## 2019-10-31 PROCEDURE — 99999 PR PBB SHADOW E&M-EST. PATIENT-LVL III: ICD-10-PCS | Mod: PBBFAC,,, | Performed by: INTERNAL MEDICINE

## 2019-10-31 PROCEDURE — 99999 PR PBB SHADOW E&M-EST. PATIENT-LVL III: CPT | Mod: PBBFAC,,, | Performed by: INTERNAL MEDICINE

## 2019-10-31 PROCEDURE — 90472 TD VACCINE GREATER THAN OR EQUAL TO 7YO PRESERVATIVE FREE IM: ICD-10-PCS | Mod: S$GLB,,, | Performed by: INTERNAL MEDICINE

## 2019-10-31 PROCEDURE — 3008F BODY MASS INDEX DOCD: CPT | Mod: CPTII,S$GLB,, | Performed by: INTERNAL MEDICINE

## 2019-11-03 NOTE — PROGRESS NOTES
Subjective:       Patient ID: Mitzi Merlin Preston is a 54 y.o. female.    Chief Complaint: Follow-up (htn)  Dictation #1  MRN:[id number]  CSN:[id number]  Dict 54-year-old white female patient reason for follow-up on her blood pressure.  Her blood pressures so have been recorded most days and run between 130 -165 systolic.  Diastolic blood pressure 60-84.  She finds that her blood pressure tends be elevated when she is stressed even with minimal stress like her son is present.  Was instructed to oral with the [facility] it has been contacted with them but has not told the program yet.  He is now on valsartan 320, amlodipine 10, hydrochlorothiazide, clonidine being 0.1.    Plan 1.  She was given tetanus vaccine and flu   2.  He was instructed to finish arranging to be enrolled in digital hypertension to have her management taken over by them.    3.  Made no changes in her program  HPI  Review of Systems   Constitutional: Negative.    HENT: Negative for congestion, hearing loss, sinus pressure, sneezing, sore throat and voice change.    Eyes: Negative for visual disturbance.   Respiratory: Negative for cough, chest tightness and shortness of breath.    Cardiovascular: Negative.  Negative for chest pain, palpitations and leg swelling.   Gastrointestinal: Negative.    Genitourinary: Negative for difficulty urinating, dysuria, flank pain, frequency, hematuria, menstrual problem, pelvic pain, urgency, vaginal bleeding and vaginal discharge.   Musculoskeletal: Negative.  Negative for neck pain and neck stiffness.   Skin: Negative.    Neurological: Negative.  Negative for dizziness, seizures, light-headedness, numbness and headaches.   Psychiatric/Behavioral: Negative for agitation, behavioral problems, confusion and sleep disturbance. The patient is not nervous/anxious.        Objective:      Physical Exam   Constitutional: She is oriented to person, place, and time. She appears well-developed and well-nourished.    Eyes: Pupils are equal, round, and reactive to light. EOM are normal.   Neck: Normal range of motion. Neck supple. No JVD present. No thyromegaly present.   Cardiovascular: Normal rate, regular rhythm, normal heart sounds and intact distal pulses. Exam reveals no gallop.   No murmur heard.  Pulmonary/Chest: Breath sounds normal. She has no wheezes. She has no rales.   Abdominal: Soft. Bowel sounds are normal. She exhibits no mass. There is no tenderness.   Musculoskeletal: Normal range of motion.   Lymphadenopathy:     She has no cervical adenopathy.   Neurological: She is alert and oriented to person, place, and time. She has normal reflexes. No cranial nerve deficit.   Skin: No rash noted. No erythema.   Psychiatric: She has a normal mood and affect. Judgment normal.       Assessment:       1. Essential hypertension    2. Vertigo    3. Bacterial sinusitis    4. Cervical adenopathy    5. Attention deficit hyperactivity disorder (ADHD), combined type    6. Restless leg syndrome    7. Anxiety        Plan:

## 2019-11-13 RX ORDER — CLONIDINE HYDROCHLORIDE 0.1 MG/1
0.1 TABLET ORAL NIGHTLY
Qty: 30 TABLET | Refills: 1 | Status: SHIPPED | OUTPATIENT
Start: 2019-11-13 | End: 2019-12-23 | Stop reason: SDUPTHER

## 2019-11-21 NOTE — PROGRESS NOTES
Subjective:       Patient ID: Mitzi Merlin Preston is a 54 y.o. female.    Chief Complaint: Follow-up (BP); Dizziness; Sore Throat; Headache; Nasal Congestion; and Fatigue  Dictation #1  MRN:[id number]  CSN:[id number]  Dict 54-year-old white female patient of mine coming back in for blood pressure check.  Her blood pressure last time I had seen her was elevated and she continues to have problems with blood pressure elevation on occasion as high as the 160-180 systolic range.  Patient also 2 days prior to this visit developed cough, headache, dizziness, sore throat, sinus pressure, fatigue, dizziness.  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  She continues to do her regular activities.      Physical exam:  Skin-no rash, normal  HEENT-ears, throat, eyes all appear normal.    Neck-anterior tender adenopathy, stiffness  Chest-clear percussion auscultation      Impression:  1.  Bacterial sinusitis  2.  Hypertension still not well controlled  3.  Vertigo secondary to 1.  4.  Cervical adenopathy secondary to the above     Plan:  1.  I have start her now on clonidine 0.1 q.p.m. and continue to monitor her blood pressure and return to see me in 1 week  2.  Z-Romel  3.  Sized to flush her nose with saline, Mucinex.  HPI  Review of Systems   Constitutional: Positive for fatigue and fever.   HENT: Positive for congestion, postnasal drip, sinus pressure and sore throat. Negative for hearing loss, sneezing and voice change.    Eyes: Negative for visual disturbance.   Respiratory: Positive for cough. Negative for chest tightness and shortness of breath.    Cardiovascular: Negative.  Negative for chest pain, palpitations and leg swelling.   Gastrointestinal: Negative.    Genitourinary: Negative for difficulty urinating, dysuria, flank pain, frequency, hematuria, menstrual problem, pelvic pain, urgency, vaginal bleeding and vaginal discharge.   Musculoskeletal: Negative.  Negative  for neck pain and neck stiffness.   Skin: Negative.    Neurological: Positive for dizziness and headaches. Negative for seizures, light-headedness and numbness.   Psychiatric/Behavioral: Negative for agitation, behavioral problems, confusion and sleep disturbance. The patient is not nervous/anxious.        Objective:      Physical Exam   Constitutional: She is oriented to person, place, and time. She appears well-developed and well-nourished.   Eyes: Pupils are equal, round, and reactive to light. EOM are normal.   Neck: Normal range of motion. Neck supple. No JVD present. No thyromegaly present.   Cardiovascular: Normal rate, regular rhythm, normal heart sounds and intact distal pulses. Exam reveals no gallop.   No murmur heard.  Pulmonary/Chest: Breath sounds normal. She has no wheezes. She has no rales.   Abdominal: Soft. Bowel sounds are normal. She exhibits no mass. There is no tenderness.   Musculoskeletal: Normal range of motion.   Lymphadenopathy:     She has no cervical adenopathy.   Neurological: She is alert and oriented to person, place, and time. She has normal reflexes. No cranial nerve deficit.   Skin: No rash noted. No erythema.   Psychiatric: She has a normal mood and affect. Judgment normal.       Assessment:       1. Bacterial sinusitis    2. Vertigo    3. Cervical adenopathy    4. Essential hypertension        Plan:

## 2019-12-03 ENCOUNTER — PATIENT MESSAGE (OUTPATIENT)
Dept: ADMINISTRATIVE | Facility: OTHER | Age: 54
End: 2019-12-03

## 2019-12-05 ENCOUNTER — PATIENT MESSAGE (OUTPATIENT)
Dept: ADMINISTRATIVE | Facility: OTHER | Age: 54
End: 2019-12-05

## 2019-12-19 ENCOUNTER — PATIENT OUTREACH (OUTPATIENT)
Dept: OTHER | Facility: OTHER | Age: 54
End: 2019-12-19

## 2019-12-19 NOTE — LETTER
February 12, 2020     Mitzi Merlin Preston  [address] [place] [address]       Dear Hodan,    Welcome to [facility]! Our goal is to make care effective, proactive and convenient by using data you send us from home to better treat your chronic conditions.              My name is Karissa Zhu, and I am your dedicated Digital Medicine clinician. As an expert in medication management, I will help ensure that the medications you are taking continue to provide the intended benefits and help you reach your goals. You can reach me directly at [phone] or by sending me a message directly through your [facility] account.      I am Analia Gregg and I will be your health . My job is to help you identify lifestyle changes to improve your disease control. We will talk about nutrition, exercise, and other ways you may be able to adjust your current habits to better your health. Additionally, we will help ensure you are completing the tests and screenings that are necessary to help manage your conditions. You can reach me directly at [phone] or by sending me a message directly through your [facility] account.    Most importantly, YOU are at the center of this team. Together, we will work to improve your overall health and encourage you to meet your goals for a healthier lifestyle.     What we expect from YOU:  · Please take frequent home blood pressure measurements. We ask that you take at least 1 blood pressure reading per week, but more information will better help us get you know you. Be sure you rest for a few minutes before taking the reading in a quiet, comfortable place.     Be available to receive phone calls or [employer] messages, when appropriate, from your care team. Please let us know if there are any specific days or times that work best for us to reach you via phone.     Complete routine tests and screenings. Dont worry, we will help keep you on track!           What you  should expect from your Digital Medicine Care Team:   We will work with you to create a personalized plan of care and provide you with encouragement and education, including regarding lifestyle changes, that could help you manage your disease states.     We will adjust your current medications, if needed, and continue to monitor your long-term progress.     We will provide you and your physician with monthly progress reports after you have been in the program for more than 30 days.     We will send you reminders through [employer] and text messages to help ensure you do not miss any testing deadlines to help manage your disease states.    You will be able to reach us by phone or through your [facility] account by clicking our names under Care Team on the right side of the home screen.    I look forward to working with you to achieve your blood pressure goals!    We look forward to working with you to help manage your health,    Sincerely,    Your Digital Medicine Team    Please visit our websites to learn more:   · Hypertension: [email or web address]      Remember, we are not available for emergencies. If you have an emergency, please contact your doctors office directly or call [facility] on-call ([phone] or [phone]) or [phone].

## 2019-12-19 NOTE — LETTER
January 21, 2020     Mitzi Merlin Preston  [address] [place] [address]       Dear Hodan,    Thank you for your interest in [facility], a clinically-proven program designed to help you manage your chronic condition more conveniently and effectively. [facility] gives you:   Technology that lets you monitor your health at home and send readings directly to your care team at [facility]   Medications managed by a dedicated pharmacist or clinician    A  who calls and helps you take manageable steps towards a healthier lifestyle       We have tried to reach you via phone and [employer] Message to complete your enrollment in the Digital Medicine program. Unfortunately, weve been unable to reach you.     Please call us to complete your enrollment. Our number is [phone]. If we dont hear from you by 2/11/2020, we will be unable to complete your enrollment at this time.     We look forward to hearing from you soon.    Sincerely,     The Digital Medicine Team

## 2019-12-23 RX ORDER — CLONIDINE HYDROCHLORIDE 0.1 MG/1
0.1 TABLET ORAL NIGHTLY
Qty: 30 TABLET | Refills: 5 | Status: SHIPPED | OUTPATIENT
Start: 2019-12-23 | End: 2020-07-27

## 2019-12-23 NOTE — TELEPHONE ENCOUNTER
Left msg 2 refills given to [facility] 11/13.  Last seen 10/31/19      I will send dr request for more refills since new pharmacy. Not sure if clonidine can be transferred .    Please approve.    Thanks sushila

## 2019-12-23 NOTE — TELEPHONE ENCOUNTER
----- Message from Elen Yadav sent at 12/23/2019 11:57 AM CST -----  Contact: Comfort with [facility]  [phone]  Patient is calling for an RX refill or new RX.  Is this a refill or new RX:Refill    RX name and strength: cloNIDine (CATAPRES) 0.1 MG tablet  Directions (copy/paste from chart):TAKE 1 TABLET (0.1 MG TOTAL) BY MOUTH EVERY EVENING. - Oral    Is this a 30 day or 90 day RX:  30  Local pharmacy or mail order pharmacy:  local  Pharmacy name and phone # (copy/paste from chart):  [facility] [phone]  Comments:

## 2019-12-30 ENCOUNTER — PATIENT MESSAGE (OUTPATIENT)
Dept: OBSTETRICS AND GYNECOLOGY | Facility: CLINIC | Age: 54
End: 2019-12-30

## 2020-01-11 ENCOUNTER — PATIENT MESSAGE (OUTPATIENT)
Dept: OBSTETRICS AND GYNECOLOGY | Facility: CLINIC | Age: 55
End: 2020-01-11

## 2020-01-14 RX ORDER — VALSARTAN 160 MG/1
160 TABLET ORAL DAILY
Qty: 30 TABLET | Refills: 3 | Status: SHIPPED | OUTPATIENT
Start: 2020-01-14 | End: 2020-05-05 | Stop reason: SDUPTHER

## 2020-02-12 NOTE — PROGRESS NOTES
Digital Medicine: Health  Introduction    Introduced Hodan Hair to Digital Medicine. Discussed health  role and recommended lifestyle modifications.    Patient is a 54-year old female newly enrolled in the Digital Medicine Program    The history is provided by the patient.     HYPERTENSION  Our goal is to get BP to consistently below 130/80mmHg and make the process convenient so patient can avoid extra trips to the office. Getting your blood pressure below 130/80mmHg (definition of control) will reduce your risk for heart attack, kidney failure, stroke and death (as well as kidney failure, eye disease, & dementia)      Reviewed that the Digital Medicine care team - consisting of a clinician and a health  - will follow the most current evidence-based national guidelines for treating your condition.  The health  will focus on lifestyle modifications and motivation while the clinician will focus on medication therapy.  The care team will review all data on a regular basis and reach out as needed.      Explained that one of the key parts of the program is communication with the care team.  Asked patient to respond to outreach attempts and complete questionnaires.  Stressed importance of medication adherence.    Explained that we expect patient to obtain several blood pressures per week at random times of day.  Instructed patient not to allow anyone else to use phone and monitoring device.  Confirmed appropriate BP monitoring technique.      Explained to patient that the digital medicine team is not available for emergencies.  [facility] ([phone] or [phone]) or [phone] if needed.            Last 5 Patient Entered Readings                                      Current 30 Day Average:      Recent Readings 1/3/2020 1/3/2020 12/27/2019 12/18/2019    SBP (mmHg) 159 157 153 134    DBP (mmHg) 78 81 81 80    Pulse 92 108 92 81            INTERVENTION(S)  reviewed monitoring  technique, encouragement/support and low sodium     PLAN  patient verbalizes understanding and demonstrates understanding via teach back      There are no preventive care reminders to display for this patient.    Reviewed the importance of self-monitoring, medication adherence, and that the health  can be used as a resource for lifestyle modifications to help reduce or maintain a healthy lifestyle.    Sent link to [facility] webpages and my contact information via [employer] for future questions. Follow up scheduled.             Diet Screening   She has the following dietary restrictions: low sodium diet    Patient follows a low sodium diet of <1200 mg. Patient reported if she consumes too much sodium, her tongue will swell.     Assigning the following patient goals: maintain low sodium diet    Physical Activity Screening   When asked if exercising, patient responded: no    She identified the following barriers to physical activity: motivation    Patient has joined a gym but has yet to attend due to illness. She is hoping that once she recovers she can attend the gym with her daughter.     Assigning the following patient goal(s): 150 minutes of exercise per week      SDOH- Deferred

## 2020-02-20 ENCOUNTER — PATIENT OUTREACH (OUTPATIENT)
Dept: ADMINISTRATIVE | Facility: OTHER | Age: 55
End: 2020-02-20

## 2020-02-20 NOTE — PROGRESS NOTES
Chart reviewed.   Requested updates from Care Everywhere.  Immunizations reconciled.   HM updated.  Fit kit previously ordered

## 2020-02-21 ENCOUNTER — OFFICE VISIT (OUTPATIENT)
Dept: OBSTETRICS AND GYNECOLOGY | Facility: CLINIC | Age: 55
End: 2020-02-21
Payer: COMMERCIAL

## 2020-02-21 ENCOUNTER — TELEPHONE (OUTPATIENT)
Dept: OBSTETRICS AND GYNECOLOGY | Facility: CLINIC | Age: 55
End: 2020-02-21

## 2020-02-21 VITALS — DIASTOLIC BLOOD PRESSURE: 76 MMHG | SYSTOLIC BLOOD PRESSURE: 142 MMHG | WEIGHT: 246.5 LBS | BODY MASS INDEX: 41.02 KG/M2

## 2020-02-21 DIAGNOSIS — Z12.4 SCREENING FOR CERVICAL CANCER: ICD-10-CM

## 2020-02-21 DIAGNOSIS — Z12.39 SCREENING FOR BREAST CANCER: ICD-10-CM

## 2020-02-21 DIAGNOSIS — Z01.419 WELL WOMAN EXAM WITH ROUTINE GYNECOLOGICAL EXAM: Primary | ICD-10-CM

## 2020-02-21 PROCEDURE — 99999 PR PBB SHADOW E&M-EST. PATIENT-LVL III: CPT | Mod: PBBFAC,,, | Performed by: OBSTETRICS & GYNECOLOGY

## 2020-02-21 PROCEDURE — 3078F DIAST BP <80 MM HG: CPT | Mod: CPTII,S$GLB,, | Performed by: OBSTETRICS & GYNECOLOGY

## 2020-02-21 PROCEDURE — 3077F SYST BP >= 140 MM HG: CPT | Mod: CPTII,S$GLB,, | Performed by: OBSTETRICS & GYNECOLOGY

## 2020-02-21 PROCEDURE — 3077F PR MOST RECENT SYSTOLIC BLOOD PRESSURE >= 140 MM HG: ICD-10-PCS | Mod: CPTII,S$GLB,, | Performed by: OBSTETRICS & GYNECOLOGY

## 2020-02-21 PROCEDURE — 87624 HPV HI-RISK TYP POOLED RSLT: CPT

## 2020-02-21 PROCEDURE — 99999 PR PBB SHADOW E&M-EST. PATIENT-LVL III: ICD-10-PCS | Mod: PBBFAC,,, | Performed by: OBSTETRICS & GYNECOLOGY

## 2020-02-21 PROCEDURE — 88175 CYTOPATH C/V AUTO FLUID REDO: CPT

## 2020-02-21 PROCEDURE — 99396 PREV VISIT EST AGE 40-64: CPT | Mod: S$GLB,,, | Performed by: OBSTETRICS & GYNECOLOGY

## 2020-02-21 PROCEDURE — 99396 PR PREVENTIVE VISIT,EST,40-64: ICD-10-PCS | Mod: S$GLB,,, | Performed by: OBSTETRICS & GYNECOLOGY

## 2020-02-21 PROCEDURE — 3078F PR MOST RECENT DIASTOLIC BLOOD PRESSURE < 80 MM HG: ICD-10-PCS | Mod: CPTII,S$GLB,, | Performed by: OBSTETRICS & GYNECOLOGY

## 2020-02-21 NOTE — PROGRESS NOTES
GYNECOLOGY OFFICE NOTE    Reason for visit: annual    HPI: Pt is a 54 y.o.  female  who presents for annual. Cycle: menarche- 12, s/p endometrial ablation:2016, Q month, duration- 1 day, flow- spotting with wiping. She is sexually active.  She uses oral contraceptives (estrogen/progesterone) for contraception.  She does not desire STI screening. She denies vaginal discharge.  Last pap: 2017, denies hx of abnormal. Last MMG 2018- negative.     Past Medical History:   Diagnosis Date    ADHD (attention deficit hyperactivity disorder)     Anxiety disorder     Hyperlipidemia     Hypertension     Hypothyroid        Past Surgical History:   Procedure Laterality Date     SECTION      CHOLECYSTECTOMY      ENDOMETRIAL ABLATION      TRIGGER FINGER RELEASE      WISDOM TOOTH EXTRACTION         Family History   Problem Relation Age of Onset    Diabetes Maternal Grandfather     Breast cancer Mother     Colon cancer Neg Hx     Ovarian cancer Neg Hx        Social History     Tobacco Use    Smoking status: Former Smoker     Packs/day: 1.00     Years: 16.00     Pack years: 16.00     Last attempt to quit: 1997     Years since quittin.2    Smokeless tobacco: Never Used   Substance Use Topics    Alcohol use: No     Frequency: Monthly or less     Drinks per session: 1 or 2     Binge frequency: Never     Comment: very rarely    Drug use: No       OB History    Para Term  AB Living   2 2 2     2   SAB TAB Ectopic Multiple Live Births           2      # Outcome Date GA Lbr Yuniel/2nd Weight Sex Delivery Anes PTL Lv   2 Term     M CS-Unspec   JOSE   1 Term     F CS-Unspec   JOSE       Current Outpatient Medications   Medication Sig    amlodipine-valsartan (EXFORGE)  mg per tablet Take 1 tablet by mouth every evening.    cloNIDine (CATAPRES) 0.1 MG tablet Take 1 tablet (0.1 mg total) by mouth every evening.    dextromethorphan-guaifenesin  mg (MUCINEX DM)  mg  per 12 hr tablet Take 1 tablet by mouth every 12 (twelve) hours.    escitalopram (LEXAPRO) 20 MG tablet Take 20 mg by mouth. 1 Tablet Oral Every day    ezetimibe-simvastatin 10-10 mg (VYTORIN) 10-10 mg per tablet Take 1 tablet by mouth every evening.    hydroCHLOROthiazide (HYDRODIURIL) 12.5 MG Tab Take 1 tablet (12.5 mg total) by mouth every morning.    levothyroxine (SYNTHROID) 112 MCG tablet Take 1 tablet (112 mcg total) by mouth once daily.    pantoprazole (PROTONIX) 40 MG tablet Take 1 tablet (40 mg total) by mouth once daily.    tiZANidine 4 mg Cap TAKE 2 CAPSULES BY MOUTH EVERY DAY AT NIGHT    valsartan (DIOVAN) 160 MG tablet Take 1 tablet (160 mg total) by mouth once daily.    VYVANSE 50 mg capsule TAKE 1 CAPSULE BY MOUTH DAILY IN THE MORNING    azithromycin (Z-KARLOS) 250 MG tablet Take 2 tablets by mouth on day 1; Take 1 tablet by mouth on days 2-5 (Patient not taking: Reported on 2/21/2020)     No current facility-administered medications for this visit.        Allergies: No known drug allergies     BP (!) 142/76   Wt 111.8 kg (246 lb 7.6 oz)   LMP 01/08/2020 (Exact Date)   BMI 41.02 kg/m²     ROS:  GENERAL: Denies fever or chills.   SKIN: Denies rash or lesions.   HEAD: Denies head injury or headache.   CHEST: Denies chest pain or shortness of breath.   CARDIOVASCULAR: Denies palpitations or chest pain.   ABDOMEN: No constipation, diarrhea, nausea, vomiting or rectal bleeding.   URINARY: No dysuria, hematuria, or burning on urination.  REPRODUCTIVE: See HPI.   BREASTS: see HPI  NEUROLOGIC: Denies syncope or weakness.     Physical Exam:  GENERAL: alert, appears stated age and cooperative  NEUROLOGIC: orientated to person, place and time, normal mood and affect   CHEST: Normal respiratory effort  NECK: normal appearance  SKIN: no acne, hirsutism  BREAST EXAM: breasts appear normal, no suspicious masses, no skin or nipple changes or axillary nodes  ABDOMEN: abdomen is soft without significant  tenderness, masses  EXTERNAL GENITALIA:  normal general appearance  URETHRA: normal urethra, normal urethral meatus  VAGINA:  normal without tenderness, induration or masses  CERVIX:  Normal  UTERUS:  mobile, non-tender  ADNEXA:  normal adnexa, nontender and no masses    Diagnosis:  1. Well woman exam with routine gynecological exam    2. Screening for cervical cancer    3. Screening for breast cancer        Plan:   1. Annual- will try stopping ocp and see if cycles stops  2. Pap/hpv today  3. MMG    Orders Placed This Encounter    HPV High Risk Genotypes, PCR    Mammo Digital Screening Bilat w/ Collin    Liquid-Based Pap Smear, Screening       Patient was counseled today on the new ACS guidelines for cervical cytology screening as well as the current recommendations for breast cancer screening. She was counseled to follow up with her PCP for other routine health maintenance.     Follow up in about 1 year (around 2/21/2021) for annual.      Gail Null MD  OB/GYN

## 2020-02-28 LAB
HPV HR 12 DNA SPEC QL NAA+PROBE: NEGATIVE
HPV16 AG SPEC QL: NEGATIVE
HPV18 DNA SPEC QL NAA+PROBE: NEGATIVE

## 2020-03-04 ENCOUNTER — PATIENT OUTREACH (OUTPATIENT)
Dept: OTHER | Facility: OTHER | Age: 55
End: 2020-03-04

## 2020-03-05 ENCOUNTER — OFFICE VISIT (OUTPATIENT)
Dept: INTERNAL MEDICINE | Facility: CLINIC | Age: 55
End: 2020-03-05
Payer: COMMERCIAL

## 2020-03-05 VITALS
HEART RATE: 88 BPM | WEIGHT: 242 LBS | BODY MASS INDEX: 40.32 KG/M2 | TEMPERATURE: 99 F | RESPIRATION RATE: 16 BRPM | SYSTOLIC BLOOD PRESSURE: 132 MMHG | HEIGHT: 65 IN | DIASTOLIC BLOOD PRESSURE: 70 MMHG

## 2020-03-05 DIAGNOSIS — G47.00 INSOMNIA, UNSPECIFIED TYPE: ICD-10-CM

## 2020-03-05 DIAGNOSIS — H69.93 DYSFUNCTION OF BOTH EUSTACHIAN TUBES: ICD-10-CM

## 2020-03-05 DIAGNOSIS — J32.9 BACTERIAL SINUSITIS: Primary | ICD-10-CM

## 2020-03-05 DIAGNOSIS — B96.89 BACTERIAL SINUSITIS: Primary | ICD-10-CM

## 2020-03-05 DIAGNOSIS — G25.81 RESTLESS LEG SYNDROME: ICD-10-CM

## 2020-03-05 PROCEDURE — 3008F PR BODY MASS INDEX (BMI) DOCUMENTED: ICD-10-PCS | Mod: CPTII,S$GLB,, | Performed by: INTERNAL MEDICINE

## 2020-03-05 PROCEDURE — 99999 PR PBB SHADOW E&M-EST. PATIENT-LVL IV: ICD-10-PCS | Mod: PBBFAC,,, | Performed by: INTERNAL MEDICINE

## 2020-03-05 PROCEDURE — 99214 OFFICE O/P EST MOD 30 MIN: CPT | Mod: 25,S$GLB,, | Performed by: INTERNAL MEDICINE

## 2020-03-05 PROCEDURE — 3078F PR MOST RECENT DIASTOLIC BLOOD PRESSURE < 80 MM HG: ICD-10-PCS | Mod: CPTII,S$GLB,, | Performed by: INTERNAL MEDICINE

## 2020-03-05 PROCEDURE — 3078F DIAST BP <80 MM HG: CPT | Mod: CPTII,S$GLB,, | Performed by: INTERNAL MEDICINE

## 2020-03-05 PROCEDURE — 3075F PR MOST RECENT SYSTOLIC BLOOD PRESS GE 130-139MM HG: ICD-10-PCS | Mod: CPTII,S$GLB,, | Performed by: INTERNAL MEDICINE

## 2020-03-05 PROCEDURE — 3008F BODY MASS INDEX DOCD: CPT | Mod: CPTII,S$GLB,, | Performed by: INTERNAL MEDICINE

## 2020-03-05 PROCEDURE — 3075F SYST BP GE 130 - 139MM HG: CPT | Mod: CPTII,S$GLB,, | Performed by: INTERNAL MEDICINE

## 2020-03-05 PROCEDURE — 96372 THER/PROPH/DIAG INJ SC/IM: CPT | Mod: S$GLB,,, | Performed by: INTERNAL MEDICINE

## 2020-03-05 PROCEDURE — 99214 PR OFFICE/OUTPT VISIT, EST, LEVL IV, 30-39 MIN: ICD-10-PCS | Mod: 25,S$GLB,, | Performed by: INTERNAL MEDICINE

## 2020-03-05 PROCEDURE — 96372 PR INJECTION,THERAP/PROPH/DIAG2ST, IM OR SUBCUT: ICD-10-PCS | Mod: S$GLB,,, | Performed by: INTERNAL MEDICINE

## 2020-03-05 PROCEDURE — 99999 PR PBB SHADOW E&M-EST. PATIENT-LVL IV: CPT | Mod: PBBFAC,,, | Performed by: INTERNAL MEDICINE

## 2020-03-05 RX ORDER — TIZANIDINE HYDROCHLORIDE 2 MG/1
2 CAPSULE, GELATIN COATED ORAL NIGHTLY
Qty: 30 CAPSULE | Refills: 2 | Status: SHIPPED | OUTPATIENT
Start: 2020-03-05 | End: 2020-03-23

## 2020-03-05 RX ORDER — TRIAMCINOLONE ACETONIDE 40 MG/ML
40 INJECTION, SUSPENSION INTRA-ARTICULAR; INTRAMUSCULAR ONCE
Status: COMPLETED | OUTPATIENT
Start: 2020-03-05 | End: 2020-03-05

## 2020-03-05 RX ORDER — DOXYCYCLINE HYCLATE 100 MG
100 TABLET ORAL 2 TIMES DAILY
Qty: 30 TABLET | Refills: 0 | Status: SHIPPED | OUTPATIENT
Start: 2020-03-05 | End: 2020-03-23

## 2020-03-05 RX ADMIN — TRIAMCINOLONE ACETONIDE 40 MG: 40 INJECTION, SUSPENSION INTRA-ARTICULAR; INTRAMUSCULAR at 02:03

## 2020-03-08 NOTE — PROGRESS NOTES
Subjective:       Patient ID: Hodan Hair is a 54 y.o. female.    Chief Complaint: ear and throat pain (sick since mardi gras started.  throat and ear pain at 2)  Dictation #1  MRN:[id number]  CSN:[id number]  Dict 54-year-old white female patient mine coming in with a 1 month history of sore throat, nose congestion, runny nose, ear discomfort intermittently.  During that time she has had occasional dizziness and on 1 occasion a sense of imbalance.  She has had no lightheadedness like she is going to pass out.  She has had no falls.  The dizzy feeling lasted on and off for about 2 days and has been gone for a while.  Patient feels run down but apparently has had no fever or chills that she is aware of.  She does have sinus allergies but this appears to be more to her than that.    Patient also wanted to discuss to her medicines.  First was Protonix with her being on it daily.  The 2nd is tizanidine for her restless legs and sleep problems. She was on 8 mg nightly but found that she had a drug feeling when she woke up in the morning.  She tried reducing it to 4 mg but she does not have adequate control and good sleep from that.    Physical exam:  Vital signs-normal  HEENT-clear including her ears  Extraocular movements full  Neck-no adenopathy, thyroid enlargement, stiffness  Chest-clear percussion auscultation    Impression:  1.  Bacterial sinusitis  2.  History of allergic sinusitis-this however is more than her allergy problems  3.  Restless leg syndrome  4.  Sleep disorder related to 3.  5.  GERD-controlled with Protonix    Plan:  1.  Kenalog 40 mg IM given 2.  Doxycycline 20 mg for 2 weeks and she has been sick for a month 3.  Advised she could stay on the Protonix but if she is able to get off every few weeks to stay off for a week to his many as possible before she gets back on and use recurrent Rigo 4.  Tizanidine 2 mg capsule written to use along with the 4 mg for total dose of 6 mg q.h.s..  5.  I will see her  regularly scheduled    HPI  Review of Systems   Constitutional: Negative.    HENT: Positive for ear pain, postnasal drip, rhinorrhea and sore throat. Negative for congestion, hearing loss, sinus pressure, sneezing and voice change.    Eyes: Negative for visual disturbance.   Respiratory: Negative for cough, chest tightness and shortness of breath.    Cardiovascular: Negative.  Negative for chest pain, palpitations and leg swelling.   Gastrointestinal: Negative.    Genitourinary: Negative for difficulty urinating, dysuria, flank pain, frequency, hematuria, menstrual problem, pelvic pain, urgency, vaginal bleeding and vaginal discharge.   Musculoskeletal: Negative.  Negative for neck pain and neck stiffness.   Skin: Negative.    Neurological: Positive for dizziness. Negative for seizures, light-headedness, numbness and headaches.   Psychiatric/Behavioral: Negative for agitation, behavioral problems, confusion and sleep disturbance. The patient is not nervous/anxious.        Objective:      Physical Exam   Constitutional: She is oriented to person, place, and time. She appears well-developed and well-nourished.   Eyes: Pupils are equal, round, and reactive to light. EOM are normal.   Neck: Normal range of motion. Neck supple. No JVD present. No thyromegaly present.   Cardiovascular: Normal rate, regular rhythm, normal heart sounds and intact distal pulses. Exam reveals no gallop.   No murmur heard.  Pulmonary/Chest: Breath sounds normal. She has no wheezes. She has no rales.   Abdominal: Soft. Bowel sounds are normal. She exhibits no mass. There is no tenderness.   Musculoskeletal: Normal range of motion.   Lymphadenopathy:     She has no cervical adenopathy.   Neurological: She is alert and oriented to person, place, and time. She has normal reflexes. No cranial nerve deficit.   Skin: No rash noted. No erythema.   Psychiatric: She has a normal mood and affect. Judgment normal.       Assessment:       1. Bacterial  sinusitis    2. Dysfunction of both eustachian tubes    3. Insomnia, unspecified type    4. Restless leg syndrome        Plan:       Time

## 2020-03-12 ENCOUNTER — PATIENT MESSAGE (OUTPATIENT)
Dept: INTERNAL MEDICINE | Facility: CLINIC | Age: 55
End: 2020-03-12

## 2020-03-16 ENCOUNTER — PATIENT OUTREACH (OUTPATIENT)
Dept: OTHER | Facility: OTHER | Age: 55
End: 2020-03-16

## 2020-03-20 NOTE — PROGRESS NOTES
Called patient for digital medicine clinical pharmacist introduction. No answer. Left message for call back. Sent [employer] message

## 2020-03-23 NOTE — PROGRESS NOTES
"Digital Medicine: Clinician Introduction    Hodan Hair is a 54 y.o. female who is newly enrolled in the Digital Medicine Clinic.    The following information was reviewed and updated:  [facility] pharmacy   [facility] - [place], [place] - [address]  [address]  [place] [address]  Phone: [phone] Fax: [phone]      Review of patient's allergies indicates:   Allergen Reactions    No known drug allergies        Called patient for digital medicine clinical pharmacist introduction. She is doing well today with no complaints. She reports that BP readings fluctuate. She reports correct BP monitoring technique. Patient reports monitoring sodium between 1103-2405 mg per day. She says that her BP is "salt sensitive" and says that when BP is higher, she may have eaten something with a little more salt. She denies symptoms of hypertension, except for "palpitation in her neck" when BP is elevated.         The history is provided by the patient.     HYPERTENSION  Our goal is to get BP to consistently below 130/80mmHg and make the process convenient so patient can avoid extra trips to the office. Getting your blood pressure below 130/80mmHg (definition of control) will reduce your risk for heart attack, kidney failure, stroke and death (as well as kidney failure, eye disease, & dementia)      Reviewed non-pharmacologic therapies and impact on BP      Explained that we expect patient to obtain several blood pressures per week at random times of day.  Instructed patient not to allow anyone else to use phone and monitoring device.  Confirmed appropriate BP monitoring technique.      Explained to patient that the digital medicine team is not available for emergencies.  Patient will call [facility] on-call ([phone] or [phone]) or [phone] if needed.    Patient's BP goal is 130/80. Patients BP average is 148/78 mmHg, which is above goal, per 2017 ACC/AHA Hypertension " Guidelines.      Med Review complete.    Allergies reviewed.      Last 5 Patient Entered Readings                                      Current 30 Day Average: 148/78     Recent Readings 3/21/2020 3/21/2020 3/11/2020 3/10/2020 3/5/2020    SBP (mmHg) 152 158 162 144 132    DBP (mmHg) 85 87 85 64 70    Pulse 77 80 90 88 88        INTERVENTION(S)  recommended diet modifications, encouragement/support and goal setting    PLAN  Health  follow up    Continue current medications.     Keep a food diary and log sodium content    Will consider increasing HCTZ at future encounter if BP remains above goal      There are no preventive care reminders to display for this patient.    Current Medication Regimen:  Hypertension Medications     amlodipine-valsartan (EXFORGE)  mg per tablet Take 1 tablet by mouth every evening.    cloNIDine (CATAPRES) 0.1 MG tablet Take 1 tablet (0.1 mg total) by mouth every evening.    hydroCHLOROthiazide (HYDRODIURIL) 12.5 MG Tab Take 1 tablet (12.5 mg total) by mouth every morning.    valsartan (DIOVAN) 160 MG tablet Take 1 tablet (160 mg total) by mouth once daily.        Reviewed the importance of self-monitoring, medication adherence, and that the health  can be used as a resource for lifestyle modifications to help reduce or maintain a healthy lifestyle.    Sent link to [facility] webpages and my contact information via [employer] for future questions. Follow up scheduled.         Screenings

## 2020-03-24 LAB
FINAL PATHOLOGIC DIAGNOSIS: NORMAL
Lab: NORMAL

## 2020-04-08 NOTE — PROGRESS NOTES
Digital Medicine: Health  Follow-Up    Patient reported she was feeling well and has no concerns or symptoms to report at this time     Patient reported she is watching her sodium intake by reducing her portion size, eating fresh produce, eating at home and reading food labels.     Encouraged patient to increase her blood pressure readings to 2-3 weeks so that team can better support her    The history is provided by the patient.     Follow Up  Follow-up reason(s): reading review and goal follow-up      Readings are trending down due to lifestyle change.    Routine Education Topics: eating patterns and physical activity        INTERVENTION(S)  encouragement/support    PLAN  patient verbalizes understanding    Plan to follow up in 4-6 weeks to review readings and progress towards her goals.       There are no preventive care reminders to display for this patient.    Last 5 Patient Entered Readings                                      Current 30 Day Average: 146/80     Recent Readings 4/6/2020 3/21/2020 3/21/2020 3/11/2020 3/10/2020    SBP (mmHg) 126 152 158 162 144    DBP (mmHg) 77 85 87 85 64    Pulse 99 77 80 90 88                      Diet Screening   Patient reports eating or drinking the following: fruitShe has the following dietary restrictions: low sodium diet    Patient reported she is watching her sodium intake by reducing her portion size, eating fresh produce and reading food labels    Physical Activity Screening   When asked if exercising, patient responded: yes    She exercises for 50 minutes per day 4 day(s) a week.  Her level of intensity when exercising is low.    Patient participates in the following activities: [place]aine    Patient reported she is able to participate in online classes through her gym.     Assigning the following patient goal(s): 150 minutes of exercise per week    Medication Adherence Screening   She did not miss a dose this month.    No concerns with her medication at this  time      SDOH- deferred

## 2020-04-20 ENCOUNTER — PATIENT OUTREACH (OUTPATIENT)
Dept: OTHER | Facility: OTHER | Age: 55
End: 2020-04-20

## 2020-04-20 DIAGNOSIS — I10 HYPERTENSION, UNSPECIFIED TYPE: Primary | ICD-10-CM

## 2020-04-20 RX ORDER — HYDROCHLOROTHIAZIDE 12.5 MG/1
25 TABLET ORAL EVERY MORNING
Qty: 60 TABLET | Refills: 5
Start: 2020-04-20 | End: 2020-06-05

## 2020-04-20 NOTE — PROGRESS NOTES
Digital Medicine: Clinician Follow-Up    Called patient for follow up. She is doing well today with no complaints. Patient says that she knows her BP is still running high. Patient reports eating chinese food over the weekend. She says she is no longer drinking caffeine. She says she is struggling to follow a low sodium diet especially given the current cirumstances with covid19    The history is provided by the patient.     Follow Up  Follow-up reason(s): reading review, medication change and routine education      Routine Education Topics: eating patterns and physical activity  Patient's 30-day BP average is above goal <130/<80 mmHg.       INTERVENTION(S)  reviewed appropriate dose schedule and recommended diet modifications    PLAN  patient verbalizes understanding and continue monitoring    Continue current medications. Increase HCTZ to 25 mg QAM    Reviewed last CMP. Will schedule repeat BMP at next encounter. Discussed s/sx of hypokalemia/ hyponatremia.       There are no preventive care reminders to display for this patient.    Last 5 Patient Entered Readings                                      Current 30 Day Average: 142/84     Recent Readings 4/20/2020 4/17/2020 4/17/2020 4/6/2020 3/21/2020    SBP (mmHg) 146 142 158 126 152    DBP (mmHg) 87 79 89 77 85    Pulse 97 88 110 99 77        Hypertension Medications     amlodipine-valsartan (EXFORGE)  mg per tablet Take 1 tablet by mouth every evening.    cloNIDine (CATAPRES) 0.1 MG tablet Take 1 tablet (0.1 mg total) by mouth every evening.    hydroCHLOROthiazide (HYDRODIURIL) 12.5 MG Tab Take 1 tablet (12.5 mg total) by mouth every morning.    valsartan (DIOVAN) 160 MG tablet Take 1 tablet (160 mg total) by mouth once daily.                 Screenings

## 2020-05-05 ENCOUNTER — PATIENT OUTREACH (OUTPATIENT)
Dept: OTHER | Facility: OTHER | Age: 55
End: 2020-05-05

## 2020-05-05 DIAGNOSIS — I10 HYPERTENSION, UNSPECIFIED TYPE: Primary | ICD-10-CM

## 2020-05-05 NOTE — TELEPHONE ENCOUNTER
Fax from [facility] for refills.  Last gave 4 refills 1/4/20.  Last visit 3/5/20.    Thanks sushila

## 2020-05-05 NOTE — PROGRESS NOTES
Digital Medicine: Clinician Follow-Up    Called patient for follow up after increasing HCTZ to 25 mg QD. Patient says overall, she is doing well. She complains of dry mouth but says she may have not been drinking as much water as she normally does.     The history is provided by the patient.     Follow Up  Follow-up reason(s): reading review and medication change follow-up      Readings are missing.   patient reminder needed.Patient's 30 day BP average is above goal of <130/<80 mmHg, but trending down since last encounter.       INTERVENTION(S)  encouragement/support    PLAN  patient verbalizes understanding and continue monitoring    Continue current medications.     Increase frequency of BP monitoring    BMP scheduled for 5/7/2020      There are no preventive care reminders to display for this patient.    Last 5 Patient Entered Readings                                      Current 30 Day Average: 137/83     Recent Readings 4/26/2020 4/20/2020 4/17/2020 4/17/2020 4/6/2020    SBP (mmHg) 132 146 142 158 126    DBP (mmHg) 81 87 79 89 77    Pulse 85 97 88 110 99          Hypertension Medications     amlodipine-valsartan (EXFORGE)  mg per tablet Take 1 tablet by mouth every evening.    cloNIDine (CATAPRES) 0.1 MG tablet Take 1 tablet (0.1 mg total) by mouth every evening.    hydroCHLOROthiazide (HYDRODIURIL) 12.5 MG Tab Take 2 tablets (25 mg total) by mouth every morning.    valsartan (DIOVAN) 160 MG tablet Take 1 tablet (160 mg total) by mouth once daily.                 Screenings

## 2020-05-07 ENCOUNTER — LAB VISIT (OUTPATIENT)
Dept: LAB | Facility: HOSPITAL | Age: 55
End: 2020-05-07
Attending: INTERNAL MEDICINE
Payer: COMMERCIAL

## 2020-05-07 DIAGNOSIS — I10 HYPERTENSION, UNSPECIFIED TYPE: ICD-10-CM

## 2020-05-07 LAB
ANION GAP SERPL CALC-SCNC: 10 MMOL/L (ref 8–16)
BUN SERPL-MCNC: 16 MG/DL (ref 6–20)
CALCIUM SERPL-MCNC: 9.8 MG/DL (ref 8.7–10.5)
CHLORIDE SERPL-SCNC: 100 MMOL/L (ref 95–110)
CO2 SERPL-SCNC: 28 MMOL/L (ref 23–29)
CREAT SERPL-MCNC: 1.3 MG/DL (ref 0.5–1.4)
EST. GFR  (AFRICAN AMERICAN): 53.7 ML/MIN/1.73 M^2
EST. GFR  (NON AFRICAN AMERICAN): 46.6 ML/MIN/1.73 M^2
GLUCOSE SERPL-MCNC: 152 MG/DL (ref 70–110)
POTASSIUM SERPL-SCNC: 3.3 MMOL/L (ref 3.5–5.1)
SODIUM SERPL-SCNC: 138 MMOL/L (ref 136–145)

## 2020-05-07 PROCEDURE — 80048 BASIC METABOLIC PNL TOTAL CA: CPT

## 2020-05-07 PROCEDURE — 36415 COLL VENOUS BLD VENIPUNCTURE: CPT | Mod: PO

## 2020-05-07 RX ORDER — VALSARTAN 160 MG/1
160 TABLET ORAL DAILY
Qty: 30 TABLET | Refills: 11 | Status: SHIPPED | OUTPATIENT
Start: 2020-05-07 | End: 2021-05-20

## 2020-05-08 ENCOUNTER — PATIENT OUTREACH (OUTPATIENT)
Dept: OTHER | Facility: OTHER | Age: 55
End: 2020-05-08

## 2020-05-12 ENCOUNTER — PATIENT MESSAGE (OUTPATIENT)
Dept: OTHER | Facility: OTHER | Age: 55
End: 2020-05-12

## 2020-05-13 ENCOUNTER — PATIENT OUTREACH (OUTPATIENT)
Dept: OTHER | Facility: OTHER | Age: 55
End: 2020-05-13

## 2020-05-14 ENCOUNTER — HOSPITAL ENCOUNTER (OUTPATIENT)
Dept: RADIOLOGY | Facility: HOSPITAL | Age: 55
Discharge: HOME | End: 2020-05-14
Attending: OBSTETRICS & GYNECOLOGY
Payer: COMMERCIAL

## 2020-05-14 DIAGNOSIS — Z12.39 SCREENING FOR BREAST CANCER: ICD-10-CM

## 2020-05-14 PROCEDURE — 77063 MAMMO DIGITAL SCREENING BILAT WITH TOMOSYNTHESIS_CAD: ICD-10-PCS | Mod: 26,,, | Performed by: RADIOLOGY

## 2020-05-14 PROCEDURE — 77067 SCR MAMMO BI INCL CAD: CPT | Mod: TC,PO

## 2020-05-14 PROCEDURE — 77067 SCR MAMMO BI INCL CAD: CPT | Mod: 26,,, | Performed by: RADIOLOGY

## 2020-05-14 PROCEDURE — 77063 BREAST TOMOSYNTHESIS BI: CPT | Mod: 26,,, | Performed by: RADIOLOGY

## 2020-05-14 PROCEDURE — 77067 MAMMO DIGITAL SCREENING BILAT WITH TOMOSYNTHESIS_CAD: ICD-10-PCS | Mod: 26,,, | Performed by: RADIOLOGY

## 2020-05-18 ENCOUNTER — TELEPHONE (OUTPATIENT)
Dept: RADIOLOGY | Facility: HOSPITAL | Age: 55
End: 2020-05-18

## 2020-05-18 DIAGNOSIS — R92.8 ABNORMAL MAMMOGRAM OF RIGHT BREAST: Primary | ICD-10-CM

## 2020-05-18 NOTE — TELEPHONE ENCOUNTER
Called patient and reviewed results. Patient understood results. Scheduled patient for diagnostic mammogram and possible ultrasound according to her preferences. Instructed patient to arrive a few minutes early to register.

## 2020-05-21 ENCOUNTER — LAB VISIT (OUTPATIENT)
Dept: LAB | Facility: HOSPITAL | Age: 55
End: 2020-05-21
Attending: INTERNAL MEDICINE
Payer: COMMERCIAL

## 2020-05-21 DIAGNOSIS — I10 HYPERTENSION, UNSPECIFIED TYPE: ICD-10-CM

## 2020-05-21 PROCEDURE — 36415 COLL VENOUS BLD VENIPUNCTURE: CPT | Mod: PO

## 2020-05-21 PROCEDURE — 80048 BASIC METABOLIC PNL TOTAL CA: CPT

## 2020-05-22 ENCOUNTER — PATIENT OUTREACH (OUTPATIENT)
Dept: OTHER | Facility: OTHER | Age: 55
End: 2020-05-22

## 2020-05-22 LAB
ANION GAP SERPL CALC-SCNC: 13 MMOL/L (ref 8–16)
BUN SERPL-MCNC: 16 MG/DL (ref 6–20)
CALCIUM SERPL-MCNC: 9.2 MG/DL (ref 8.7–10.5)
CHLORIDE SERPL-SCNC: 100 MMOL/L (ref 95–110)
CO2 SERPL-SCNC: 24 MMOL/L (ref 23–29)
CREAT SERPL-MCNC: 1.6 MG/DL (ref 0.5–1.4)
EST. GFR  (AFRICAN AMERICAN): 41.8 ML/MIN/1.73 M^2
EST. GFR  (NON AFRICAN AMERICAN): 36.3 ML/MIN/1.73 M^2
GLUCOSE SERPL-MCNC: 172 MG/DL (ref 70–110)
POTASSIUM SERPL-SCNC: 3.3 MMOL/L (ref 3.5–5.1)
SODIUM SERPL-SCNC: 137 MMOL/L (ref 136–145)

## 2020-05-22 NOTE — PROGRESS NOTES
Called patient for follow up and to address BMP results on 5/212020.     Per chart review, patient's valsartan was increased from 160 mg daily to 320 mg daily 09/ 2019 and she was started on HCTZ 12.5 mg 10/ 2019. I increased HCTZ to 25 mg daily on 4/2020 and checked BMP two weeks after. Cr bumped to 1.3 from baseline of approximately 0.9. Patient was directed to hydrated and repeat BMP in 2 weeks. Cr increased to 1.6.     Will direct patient to hold HCTZ. Will plan to repeat BMP in 2-4 weeks. Will consider the addition of carvedilol for BP control.

## 2020-05-22 NOTE — PROGRESS NOTES
"Digital Medicine: Clinician Follow-Up    Patient returned my call for follow up. Patient says that she has been experiencing trouble urinating. She says she doesn't feel like she has a "bladder infection or anything". She says that she is experiencing occasional pain and says "it almost feels like my urethra is blocked".     She says that she has been trying to monitor sodium intake and reports that she was down to 1000 mg per day a time or two.     The history is provided by the patient.     Follow Up  Follow-up reason(s): reading review, medication change and routine education      Routine Education Topics: eating patterns and meal planning  Patient's BP is above goal of <130/<80 mmHg.     Cr trending up on BMP. K 3.3      INTERVENTION(S)  reviewed appropriate dose schedule, recommended diet modifications and recommended med change    PLAN  patient verbalizes understanding, patient amenable to changes and continue monitoring    Continue current medications, except stop HCTZ.     Will consider adding carvedilol if BP remains above goal     Hydrate. Will plan to repeat BMP in 2-4 weeks.     Messaged patient and advised her to discuss urinary symptoms with PCP      There are no preventive care reminders to display for this patient.    Last 5 Patient Entered Readings                                      Current 30 Day Average: 146/74     Recent Readings 5/21/2020 5/19/2020 5/8/2020 4/26/2020 4/20/2020    SBP (mmHg) 147 147 158 132 146    DBP (mmHg) 56 82 75 81 87    Pulse 78 81 96 85 97             Hypertension Medications     amlodipine-valsartan (EXFORGE)  mg per tablet Take 1 tablet by mouth every evening.    cloNIDine (CATAPRES) 0.1 MG tablet Take 1 tablet (0.1 mg total) by mouth every evening.    hydroCHLOROthiazide (HYDRODIURIL) 12.5 MG Tab Take 2 tablets (25 mg total) by mouth every morning.    valsartan (DIOVAN) 160 MG tablet Take 1 tablet (160 mg total) by mouth once daily.                 Screenings  "

## 2020-05-23 RX ORDER — EZETIMIBE AND SIMVASTATIN 10; 10 MG/1; MG/1
1 TABLET ORAL NIGHTLY
Qty: 90 TABLET | Refills: 3 | Status: SHIPPED | OUTPATIENT
Start: 2020-05-23 | End: 2021-06-29

## 2020-05-25 ENCOUNTER — HOSPITAL ENCOUNTER (OUTPATIENT)
Dept: RADIOLOGY | Facility: HOSPITAL | Age: 55
Discharge: HOME | End: 2020-05-25
Attending: OBSTETRICS & GYNECOLOGY
Payer: COMMERCIAL

## 2020-05-25 ENCOUNTER — TELEPHONE (OUTPATIENT)
Dept: RADIOLOGY | Facility: HOSPITAL | Age: 55
End: 2020-05-25

## 2020-05-25 DIAGNOSIS — R92.8 ABNORMAL MAMMOGRAM: ICD-10-CM

## 2020-05-25 PROCEDURE — 77061 BREAST TOMOSYNTHESIS UNI: CPT | Mod: TC,PO

## 2020-05-25 PROCEDURE — 76642 ULTRASOUND BREAST LIMITED: CPT | Mod: 26,RT,, | Performed by: RADIOLOGY

## 2020-05-25 PROCEDURE — 77065 DX MAMMO INCL CAD UNI: CPT | Mod: 26,,, | Performed by: RADIOLOGY

## 2020-05-25 PROCEDURE — 76642 US BREAST RIGHT LIMITED: ICD-10-PCS | Mod: 26,RT,, | Performed by: RADIOLOGY

## 2020-05-25 PROCEDURE — 77061 BREAST TOMOSYNTHESIS UNI: CPT | Mod: 26,,, | Performed by: RADIOLOGY

## 2020-05-25 PROCEDURE — 76642 ULTRASOUND BREAST LIMITED: CPT | Mod: TC,PO,RT

## 2020-05-25 PROCEDURE — 77065 DX MAMMO INCL CAD UNI: CPT | Mod: TC,PO

## 2020-05-25 PROCEDURE — 77061 MAMMO DIGITAL DIAGNOSTIC RIGHT WITH TOMOSYNTHESIS_CAD: ICD-10-PCS | Mod: 26,,, | Performed by: RADIOLOGY

## 2020-05-25 PROCEDURE — 77065 MAMMO DIGITAL DIAGNOSTIC RIGHT WITH TOMOSYNTHESIS_CAD: ICD-10-PCS | Mod: 26,,, | Performed by: RADIOLOGY

## 2020-05-25 NOTE — TELEPHONE ENCOUNTER
Spoke with patient. Reviewed breast biopsy procedure and reviewed instructions for breast biopsy. Patient expressed understanding and all questions were answered. Provided patient with my phone number to call for any further concerns or questions.   Patient scheduled breast biopsy at the [facility] on 5/27/2020.

## 2020-05-29 ENCOUNTER — HOSPITAL ENCOUNTER (OUTPATIENT)
Dept: RADIOLOGY | Facility: HOSPITAL | Age: 55
Discharge: HOME | End: 2020-05-29
Attending: OBSTETRICS & GYNECOLOGY
Payer: COMMERCIAL

## 2020-05-29 DIAGNOSIS — R92.8 ABNORMAL MAMMOGRAM: ICD-10-CM

## 2020-05-29 PROCEDURE — 19081 MAMMO BREAST STEREOTACTIC BREAST BIOPSY RIGHT: ICD-10-PCS | Mod: RT,,, | Performed by: RADIOLOGY

## 2020-05-29 PROCEDURE — 88305 TISSUE EXAM BY PATHOLOGIST: ICD-10-PCS | Mod: 26,,, | Performed by: PATHOLOGY

## 2020-05-29 PROCEDURE — 88305 TISSUE EXAM BY PATHOLOGIST: CPT | Mod: 26,,, | Performed by: PATHOLOGY

## 2020-05-29 PROCEDURE — 19081 BX BREAST 1ST LESION STRTCTC: CPT | Mod: RT,,, | Performed by: RADIOLOGY

## 2020-05-29 PROCEDURE — 27200939 MAMMO BREAST STEREOTACTIC BREAST BIOPSY RIGHT: Mod: PO

## 2020-05-29 PROCEDURE — 88305 TISSUE EXAM BY PATHOLOGIST: CPT | Performed by: PATHOLOGY

## 2020-05-29 PROCEDURE — 25000003 PHARM REV CODE 250: Mod: PO | Performed by: OBSTETRICS & GYNECOLOGY

## 2020-05-29 RX ORDER — LIDOCAINE HYDROCHLORIDE AND EPINEPHRINE 20; 10 MG/ML; UG/ML
20 INJECTION, SOLUTION INFILTRATION; PERINEURAL ONCE
Status: COMPLETED | OUTPATIENT
Start: 2020-05-29 | End: 2020-05-29

## 2020-05-29 RX ORDER — LIDOCAINE HYDROCHLORIDE 10 MG/ML
1 INJECTION, SOLUTION EPIDURAL; INFILTRATION; INTRACAUDAL; PERINEURAL ONCE
Status: COMPLETED | OUTPATIENT
Start: 2020-05-29 | End: 2020-05-29

## 2020-05-29 RX ADMIN — LIDOCAINE HYDROCHLORIDE,EPINEPHRINE BITARTRATE 20 ML: 20; .01 INJECTION, SOLUTION INFILTRATION; PERINEURAL at 04:05

## 2020-05-29 RX ADMIN — LIDOCAINE HYDROCHLORIDE 50 MG: 10 INJECTION, SOLUTION EPIDURAL; INFILTRATION; INTRACAUDAL; PERINEURAL at 04:05

## 2020-06-02 ENCOUNTER — TELEPHONE (OUTPATIENT)
Dept: INTERNAL MEDICINE | Facility: CLINIC | Age: 55
End: 2020-06-02

## 2020-06-02 ENCOUNTER — DOCUMENTATION ONLY (OUTPATIENT)
Dept: SURGERY | Facility: CLINIC | Age: 55
End: 2020-06-02

## 2020-06-02 LAB
FINAL PATHOLOGIC DIAGNOSIS: NORMAL
GROSS: NORMAL

## 2020-06-02 RX ORDER — POTASSIUM CHLORIDE 750 MG/1
20 TABLET, EXTENDED RELEASE ORAL 2 TIMES DAILY
Qty: 90 TABLET | Refills: 3 | Status: SHIPPED | OUTPATIENT
Start: 2020-06-02 | End: 2021-05-12

## 2020-06-02 NOTE — TELEPHONE ENCOUNTER
She has developed high glucose, low K+ and renal impairment.  She is going to need to get on something to lower her glucose unless she has gained a lot of weight and poor diet.  If so that can be corrected and will repeat lab in 1 mo.  She needs to drink a lot of fluids now to improve renal function.  She is also going to need to be on K+ since she had increase of HCTZ, and I have put that in.

## 2020-06-02 NOTE — PROGRESS NOTES
Biopsy results received,  spoke with pt & notified of the following:  Benign, but discordant.  Recommend right mammogram followed by possible repeat tomosynthesis guided biopsy right breast.  I called her and gave her a heads up.    Marilia called pt & scheduled pt for next Wednesday.

## 2020-06-05 ENCOUNTER — PATIENT OUTREACH (OUTPATIENT)
Dept: OTHER | Facility: OTHER | Age: 55
End: 2020-06-05

## 2020-06-05 ENCOUNTER — PATIENT MESSAGE (OUTPATIENT)
Dept: ADMINISTRATIVE | Facility: OTHER | Age: 55
End: 2020-06-05

## 2020-06-05 DIAGNOSIS — I10 HYPERTENSION, UNSPECIFIED TYPE: Primary | ICD-10-CM

## 2020-06-05 NOTE — PROGRESS NOTES
Digital Medicine: Clinician Follow-Up    Called patient for follow up and to schedule follow up BMP. Patient confirms that she stopped taking HCTZ as directed at last encounter. She started taking KCL supplement yesterday as directed by PCP.     The history is provided by the patient.   Follow Up  Follow-up reason(s): reading review, medication change follow-up and routine education      Medication Change: stop therapy  Patient's 30-day BP average is above goal of <130/<80 mmHg.   Last BP reading submitted was prior to stopping HCTZ  Patient has stopped taking HCTZ secondary to K of 3.3 and upward trend in cr  BG elevated but lab was non-fasting      INTERVENTION(S)  reviewed appropriate dose schedule, recommended med change and encouragement/support    PLAN  patient verbalizes understanding, patient amenable to changes and continue monitoring    Continue current medications.   Hold KCL supplement until repeat BMP on 6/25/2020  Patient to resume regular BP monitoring. If BP trends up, will consider starting carvedilol.   Messaged PCP to discuss K and plan for repeat BMP      There are no preventive care reminders to display for this patient.    Last 5 Patient Entered Readings                                      Current 30 Day Average: 151/71     Recent Readings 5/21/2020 5/19/2020 5/8/2020 4/26/2020 4/20/2020    SBP (mmHg) 147 147 158 132 146    DBP (mmHg) 56 82 75 81 87    Pulse 78 81 96 85 97          Hypertension Medications     amlodipine-valsartan (EXFORGE)  mg per tablet Take 1 tablet by mouth every evening.    cloNIDine (CATAPRES) 0.1 MG tablet Take 1 tablet (0.1 mg total) by mouth every evening.    valsartan (DIOVAN) 160 MG tablet Take 1 tablet (160 mg total) by mouth once daily.                 Screenings

## 2020-06-06 ENCOUNTER — TELEPHONE (OUTPATIENT)
Dept: INTERNAL MEDICINE | Facility: CLINIC | Age: 55
End: 2020-06-06

## 2020-06-10 ENCOUNTER — HOSPITAL ENCOUNTER (OUTPATIENT)
Dept: RADIOLOGY | Facility: HOSPITAL | Age: 55
Discharge: HOME | End: 2020-06-10
Attending: OBSTETRICS & GYNECOLOGY
Payer: COMMERCIAL

## 2020-06-10 DIAGNOSIS — R92.8 ABNORMAL MAMMOGRAM: ICD-10-CM

## 2020-06-10 PROCEDURE — 88341 IMHCHEM/IMCYTCHM EA ADD ANTB: CPT | Performed by: PATHOLOGY

## 2020-06-10 PROCEDURE — 88305 TISSUE EXAM BY PATHOLOGIST: CPT | Mod: 26,,, | Performed by: PATHOLOGY

## 2020-06-10 PROCEDURE — 88341 PR IHC OR ICC EACH ADD'L SINGLE ANTIBODY  STAINPR: ICD-10-PCS | Mod: 26,,, | Performed by: PATHOLOGY

## 2020-06-10 PROCEDURE — 77065 MAMMO DIGITAL DIAGNOSTIC RIGHT WITH TOMOSYNTHESIS_CAD: ICD-10-PCS | Mod: 26,,, | Performed by: RADIOLOGY

## 2020-06-10 PROCEDURE — 27200939 MAMMO BREAST STEREOTACTIC BREAST BIOPSY RIGHT: Mod: PO

## 2020-06-10 PROCEDURE — 77061 MAMMO DIGITAL DIAGNOSTIC RIGHT WITH TOMOSYNTHESIS_CAD: ICD-10-PCS | Mod: 26,,, | Performed by: RADIOLOGY

## 2020-06-10 PROCEDURE — 88342 IMHCHEM/IMCYTCHM 1ST ANTB: CPT | Performed by: PATHOLOGY

## 2020-06-10 PROCEDURE — 88305 TISSUE EXAM BY PATHOLOGIST: CPT | Performed by: PATHOLOGY

## 2020-06-10 PROCEDURE — 77065 DX MAMMO INCL CAD UNI: CPT | Mod: 26,,, | Performed by: RADIOLOGY

## 2020-06-10 PROCEDURE — 19081 BX BREAST 1ST LESION STRTCTC: CPT | Mod: RT,,, | Performed by: RADIOLOGY

## 2020-06-10 PROCEDURE — 19081 MAMMO BREAST STEREOTACTIC BREAST BIOPSY RIGHT: ICD-10-PCS | Mod: RT,,, | Performed by: RADIOLOGY

## 2020-06-10 PROCEDURE — 88305 TISSUE EXAM BY PATHOLOGIST: ICD-10-PCS | Mod: 26,,, | Performed by: PATHOLOGY

## 2020-06-10 PROCEDURE — 88342 IMHCHEM/IMCYTCHM 1ST ANTB: CPT | Mod: 26,,, | Performed by: PATHOLOGY

## 2020-06-10 PROCEDURE — 25000003 PHARM REV CODE 250: Mod: PO | Performed by: OBSTETRICS & GYNECOLOGY

## 2020-06-10 PROCEDURE — 77061 BREAST TOMOSYNTHESIS UNI: CPT | Mod: 26,,, | Performed by: RADIOLOGY

## 2020-06-10 PROCEDURE — 88342 CHG IMMUNOCYTOCHEMISTRY: ICD-10-PCS | Mod: 26,,, | Performed by: PATHOLOGY

## 2020-06-10 PROCEDURE — 77065 DX MAMMO INCL CAD UNI: CPT | Mod: TC,PO

## 2020-06-10 PROCEDURE — 88341 IMHCHEM/IMCYTCHM EA ADD ANTB: CPT | Mod: 26,,, | Performed by: PATHOLOGY

## 2020-06-10 RX ORDER — LIDOCAINE HYDROCHLORIDE AND EPINEPHRINE 20; 10 MG/ML; UG/ML
20 INJECTION, SOLUTION INFILTRATION; PERINEURAL ONCE
Status: COMPLETED | OUTPATIENT
Start: 2020-06-10 | End: 2020-06-10

## 2020-06-10 RX ORDER — LIDOCAINE HYDROCHLORIDE 10 MG/ML
5 INJECTION, SOLUTION EPIDURAL; INFILTRATION; INTRACAUDAL; PERINEURAL ONCE
Status: COMPLETED | OUTPATIENT
Start: 2020-06-10 | End: 2020-06-10

## 2020-06-10 RX ADMIN — LIDOCAINE HYDROCHLORIDE 30 MG: 10 INJECTION, SOLUTION EPIDURAL; INFILTRATION; INTRACAUDAL; PERINEURAL at 03:06

## 2020-06-10 RX ADMIN — LIDOCAINE HYDROCHLORIDE,EPINEPHRINE BITARTRATE 20 ML: 20; .01 INJECTION, SOLUTION INFILTRATION; PERINEURAL at 03:06

## 2020-06-12 LAB
FINAL PATHOLOGIC DIAGNOSIS: NORMAL
GROSS: NORMAL

## 2020-06-16 ENCOUNTER — TELEPHONE (OUTPATIENT)
Dept: SURGERY | Facility: CLINIC | Age: 55
End: 2020-06-16

## 2020-06-16 NOTE — TELEPHONE ENCOUNTER
Attempted to call pt to follow up on her conversation with  yesterday. No answer, left voicemail for return call.

## 2020-06-17 ENCOUNTER — PATIENT OUTREACH (OUTPATIENT)
Dept: OTHER | Facility: OTHER | Age: 55
End: 2020-06-17

## 2020-06-18 ENCOUNTER — TELEPHONE (OUTPATIENT)
Dept: SURGERY | Facility: CLINIC | Age: 55
End: 2020-06-18

## 2020-06-18 NOTE — PROGRESS NOTES
Called patient to address upward trend in BP since stopping HCTZ secondary to decline in renal function and hypokalemia seen on BMP. Will discuss starting carvedilol

## 2020-06-22 ENCOUNTER — TELEPHONE (OUTPATIENT)
Dept: SURGERY | Facility: CLINIC | Age: 55
End: 2020-06-22

## 2020-06-22 NOTE — TELEPHONE ENCOUNTER
Pt returned call. She states she spoke with her father & brother who work/have worked in oncology & is ready to schedule a consult with breast surgery. Scheduled with  on July 6th, reviewed appointment specifics with her, she voiced understanding.

## 2020-06-22 NOTE — TELEPHONE ENCOUNTER
----- Message from Carlos Pretty MD sent at 6/15/2020 12:23 PM CDT -----  Benign, but possibly discordant.  However, I would like her to see breast surgery to discuss possible excision vs follow-up mammogram.  Please come discuss details with me before you call her.  I have already spoken to her and I have updates.    Thank you,    Carlos Pretty M.D.

## 2020-06-25 ENCOUNTER — LAB VISIT (OUTPATIENT)
Dept: LAB | Facility: HOSPITAL | Age: 55
End: 2020-06-25
Payer: COMMERCIAL

## 2020-06-25 DIAGNOSIS — Z20.822 EXPOSURE TO COVID-19 VIRUS: ICD-10-CM

## 2020-06-25 DIAGNOSIS — I10 HYPERTENSION, UNSPECIFIED TYPE: ICD-10-CM

## 2020-06-25 LAB
ANION GAP SERPL CALC-SCNC: 12 MMOL/L (ref 8–16)
BUN SERPL-MCNC: 17 MG/DL (ref 6–20)
CALCIUM SERPL-MCNC: 9.5 MG/DL (ref 8.7–10.5)
CHLORIDE SERPL-SCNC: 102 MMOL/L (ref 95–110)
CO2 SERPL-SCNC: 23 MMOL/L (ref 23–29)
CREAT SERPL-MCNC: 1.2 MG/DL (ref 0.5–1.4)
EST. GFR  (AFRICAN AMERICAN): 59.2 ML/MIN/1.73 M^2
EST. GFR  (NON AFRICAN AMERICAN): 51.4 ML/MIN/1.73 M^2
GLUCOSE SERPL-MCNC: 117 MG/DL (ref 70–110)
POTASSIUM SERPL-SCNC: 4.1 MMOL/L (ref 3.5–5.1)
SARS-COV-2 IGG SERPLBLD QL IA.RAPID: NEGATIVE
SODIUM SERPL-SCNC: 137 MMOL/L (ref 136–145)

## 2020-06-25 PROCEDURE — 80048 BASIC METABOLIC PNL TOTAL CA: CPT

## 2020-06-25 PROCEDURE — 36415 COLL VENOUS BLD VENIPUNCTURE: CPT | Mod: PO

## 2020-06-25 PROCEDURE — 86769 SARS-COV-2 COVID-19 ANTIBODY: CPT

## 2020-06-29 ENCOUNTER — PATIENT MESSAGE (OUTPATIENT)
Dept: OTHER | Facility: OTHER | Age: 55
End: 2020-06-29

## 2020-07-02 ENCOUNTER — PATIENT OUTREACH (OUTPATIENT)
Dept: ADMINISTRATIVE | Facility: OTHER | Age: 55
End: 2020-07-02

## 2020-07-02 ENCOUNTER — PATIENT OUTREACH (OUTPATIENT)
Dept: OTHER | Facility: OTHER | Age: 55
End: 2020-07-02

## 2020-07-02 DIAGNOSIS — I10 HYPERTENSION, UNSPECIFIED TYPE: Primary | ICD-10-CM

## 2020-07-02 DIAGNOSIS — Z12.11 COLON CANCER SCREENING: Primary | ICD-10-CM

## 2020-07-05 NOTE — PROGRESS NOTES
New Patient Evaluation  Date of Service: 2020    Chief complaint: discordant biopsy right    HISTORY OF PRESENT ILLNESS:   Hodan Hair is a 54 y.o. female who presents for evaluation of an abnormal right both mammogram and ultrasound. Patient initially underwent screening mammogram on 5/15/20. Diagnostic imaging was performed 20.  Follow-up imaging showed asymmetry at 12 o'clock in the right breast. Films were interpreted as BIRADS 4. A biopsy was performed which was interpreted as benign but possibly discordant. 2 biopsies were performed and both benign.      GYNECOLOGIC HISTORY:   Patient underwent menarche at age 11.Age of menopause was n/a.  Uterine ablation at 51.  Stopped OCP 4 mnths ago. Perimenopausal.  Patient denies hormonal therapy.   She is  and first live birth was at age 29. She did not breast feed.     IMAGING:   Mammo with US 20  Mammo Digital Diagnostic Right w/ [facility]  The right breast is almost entirely fatty.   In the right breast 12:00 o'clock axis posterior depth, there is a focal asymmetry measuring 0.3 cm. This corresponds to the right breast screening mammogram finding, new since 2018.   Limited right breast ultrasound:   No sonographic correlate for the right breast 12:00 o'clock axis mammographic finding.      No right axillary adenopathy.      Impression:  Right breast 12:00 o'clock axis posterior depth developing focal asymmetry corresponds to the screening mammogram finding. BI-RADS 4: Suspicious. Recommend tomosynthesis guided biopsy.      BI-RADS Category:   Overall: 4 - Suspicious    Pathology:  Stereotactic biopsy of right breast 6/10/20:  Chronically inflamed fibroadipose tissue with rare breast ducts and abundant   fat necrosis   Abundant macrophages are highlighted on CD68 immunostains and rare breast   ducts are highlighted on AE1/AE3 immunostain   No evidence of in situ or invasive carcinoma     HISTORY:     Past Medical History:   Diagnosis Date    ADHD  (attention deficit hyperactivity disorder)     Anxiety disorder     Hyperlipidemia     Hypertension     Hypothyroid      Past Surgical History:   Procedure Laterality Date     SECTION      CHOLECYSTECTOMY      ENDOMETRIAL ABLATION      TRIGGER FINGER RELEASE      WISDOM TOOTH EXTRACTION       Current Outpatient Medications on File Prior to Visit   Medication Sig Dispense Refill    amlodipine-valsartan (EXFORGE)  mg per tablet Take 1 tablet by mouth every evening. 90 tablet 3    cloNIDine (CATAPRES) 0.1 MG tablet Take 1 tablet (0.1 mg total) by mouth every evening. 30 tablet 5    dextromethorphan-guaifenesin  mg (MUCINEX DM)  mg per 12 hr tablet Take 1 tablet by mouth every 12 (twelve) hours.      escitalopram (LEXAPRO) 20 MG tablet Take 20 mg by mouth. 1 Tablet Oral Every day      ezetimibe-simvastatin 10-10 mg (VYTORIN) 10-10 mg per tablet Take 1 tablet by mouth every evening. 90 tablet 3    levothyroxine (SYNTHROID) 112 MCG tablet Take 1 tablet (112 mcg total) by mouth once daily. 90 tablet 3    pantoprazole (PROTONIX) 40 MG tablet Take 1 tablet (40 mg total) by mouth once daily. 90 tablet 1    potassium chloride SA (K-DUR,KLOR-CON) 10 MEQ tablet Take 2 tablets (20 mEq total) by mouth 2 (two) times daily. 90 tablet 3    tiZANidine 4 mg Cap TAKE 2 CAPSULES BY MOUTH EVERY DAY AT NIGHT 180 capsule 3    valsartan (DIOVAN) 160 MG tablet Take 1 tablet (160 mg total) by mouth once daily. 30 tablet 11    VYVANSE 50 mg capsule TAKE 1 CAPSULE BY MOUTH DAILY IN THE MORNING  0     No current facility-administered medications on file prior to visit.      Social History     Socioeconomic History    Marital status:      Spouse name: Not on file    Number of children: Not on file    Years of education: Not on file    Highest education level: Not on file   Occupational History    Not on file   Social Needs    Financial resource strain: Not hard at all    Food insecurity      Worry: Never true     Inability: Never true    Transportation needs     Medical: No     Non-medical: No   Tobacco Use    Smoking status: Former Smoker     Packs/day: 1.00     Years: 16.00     Pack years: 16.00     Quit date: 1997     Years since quittin.6    Smokeless tobacco: Never Used   Substance and Sexual Activity    Alcohol use: No     Frequency: Monthly or less     Drinks per session: 1 or 2     Binge frequency: Never     Comment: very rarely    Drug use: No    Sexual activity: Not Currently     Partners: Male     Birth control/protection: None   Lifestyle    Physical activity     Days per week: 2 days     Minutes per session: 20 min    Stress: Not at all   Relationships    Social connections     Talks on phone: More than three times a week     Gets together: Twice a week     Attends [employer] service: 1 to 4 times per year     Active member of club or organization: Yes     Attends meetings of clubs or organizations: 1 to 4 times per year     Relationship status:    Other Topics Concern    Not on file   Social History Narrative    Not on file     Family History   Problem Relation Age of Onset    Diabetes Maternal Grandfather     Breast cancer Mother     Colon cancer Neg Hx     Ovarian cancer Neg Hx         REVIEW OF SYSTEMS:   Review of Systems   Constitutional: Negative for fatigue and fever.   HENT: Negative for sore throat and trouble swallowing.    Eyes: Negative for visual disturbance.   Respiratory: Negative for cough and shortness of breath.    Cardiovascular: Negative for chest pain and palpitations.   Gastrointestinal: Negative for abdominal pain, constipation, diarrhea and nausea.   Genitourinary: Negative for difficulty urinating and dysuria.   Musculoskeletal: Negative for arthralgias and back pain.   Neurological: Negative for dizziness, weakness and headaches.   Hematological: Negative for adenopathy.       PHYSICAL EXAM:   There were no vitals taken for this  visit.  General: The patient appears well and is in no acute distress.   Neuro: Alert & oriented x3.   HEENT: nontraumatic   Cardiovascular: Regular rate  Breasts: The exam was done with the patient seated and supine. No nipple retraction or dimpling, No nipple discharge or bleeding, No axillary or supraclavicular adenopathy, positive findings: nothing suspicious on exam.  Pulmonary: normal effort, nonlabored breathing  Abdomen: soft, nontender  Extremities:  No pedal edema.    ASSESSMENT:     This is a 54 y.o. female with discordant RIGHT breast biopsy.     PLAN:   Options discussed of observation vs. Excision.  The safest approach would be excision given possible discordance.  althought with 2 biopsy with clips in appropriated location, oberation would be reasonable.     She prefers excision so will schedule.

## 2020-07-06 ENCOUNTER — OFFICE VISIT (OUTPATIENT)
Dept: SURGERY | Facility: CLINIC | Age: 55
End: 2020-07-06
Payer: COMMERCIAL

## 2020-07-06 VITALS
HEIGHT: 66 IN | DIASTOLIC BLOOD PRESSURE: 77 MMHG | WEIGHT: 242.38 LBS | HEART RATE: 92 BPM | BODY MASS INDEX: 38.95 KG/M2 | SYSTOLIC BLOOD PRESSURE: 160 MMHG

## 2020-07-06 DIAGNOSIS — Z01.818 PREOP TESTING: Primary | ICD-10-CM

## 2020-07-06 DIAGNOSIS — N64.1 FAT NECROSIS OF BREAST: ICD-10-CM

## 2020-07-06 DIAGNOSIS — R92.8 ABNORMAL MAMMOGRAM: ICD-10-CM

## 2020-07-06 PROCEDURE — 99999 PR PBB SHADOW E&M-EST. PATIENT-LVL III: CPT | Mod: PBBFAC,,, | Performed by: SURGERY

## 2020-07-06 PROCEDURE — 3077F PR MOST RECENT SYSTOLIC BLOOD PRESSURE >= 140 MM HG: ICD-10-PCS | Mod: CPTII,S$GLB,, | Performed by: SURGERY

## 2020-07-06 PROCEDURE — 3078F PR MOST RECENT DIASTOLIC BLOOD PRESSURE < 80 MM HG: ICD-10-PCS | Mod: CPTII,S$GLB,, | Performed by: SURGERY

## 2020-07-06 PROCEDURE — 3078F DIAST BP <80 MM HG: CPT | Mod: CPTII,S$GLB,, | Performed by: SURGERY

## 2020-07-06 PROCEDURE — 3008F BODY MASS INDEX DOCD: CPT | Mod: CPTII,S$GLB,, | Performed by: SURGERY

## 2020-07-06 PROCEDURE — 3077F SYST BP >= 140 MM HG: CPT | Mod: CPTII,S$GLB,, | Performed by: SURGERY

## 2020-07-06 PROCEDURE — 99204 OFFICE O/P NEW MOD 45 MIN: CPT | Mod: S$GLB,,, | Performed by: SURGERY

## 2020-07-06 PROCEDURE — 3008F PR BODY MASS INDEX (BMI) DOCUMENTED: ICD-10-PCS | Mod: CPTII,S$GLB,, | Performed by: SURGERY

## 2020-07-06 PROCEDURE — 99999 PR PBB SHADOW E&M-EST. PATIENT-LVL III: ICD-10-PCS | Mod: PBBFAC,,, | Performed by: SURGERY

## 2020-07-06 PROCEDURE — 99204 PR OFFICE/OUTPT VISIT, NEW, LEVL IV, 45-59 MIN: ICD-10-PCS | Mod: S$GLB,,, | Performed by: SURGERY

## 2020-07-06 NOTE — LETTER
July 14, 2020      Carlos Pretty MD  [address]  [place] [address]           [facility]  [address], [address]  [facility] [address]  Phone: [phone]  Fax: [phone]          Patient: Hodan Hair   MR Number: [id number]   YOB: 1965   Date of Visit: 7/6/2020       Dear Dr. Carlos Pretty:    Thank you for referring Hodan Hair to me for evaluation. Attached you will find relevant portions of my assessment and plan of care.    If you have questions, please do not hesitate to call me. I look forward to following Hodan Hair along with you.    Sincerely,    Marybeth Garcia MD    Enclosure  CC:  No Recipients    If you would like to receive this communication electronically, please contact [email or web address] or [phone] to request more information on [facility] access.    For providers and/or their staff who would like to refer a patient to [facility], please contact us through our one-stop-shop provider referral line, [facility], at [phone].    If you feel you have received this communication in error or would no longer like to receive these types of communications, please e-mail [email or web address]

## 2020-07-07 RX ORDER — CARVEDILOL 6.25 MG/1
6.25 TABLET ORAL 2 TIMES DAILY WITH MEALS
Qty: 60 TABLET | Refills: 5 | Status: SHIPPED | OUTPATIENT
Start: 2020-07-07 | End: 2020-09-08 | Stop reason: SDUPTHER

## 2020-07-07 NOTE — PROGRESS NOTES
"Digital Medicine: Clinician Follow-Up    Called patient for follow up and to address upward trend in BP. She is doing well today. She says that "UTI-like symptoms" that she was experiencing when she was taking HCTZ disappeared a couple of days after she stopped HCTZ        The history is provided by the patient.   Follow Up  Follow-up reason(s): reading review, medication change and routine education      Readings are trending up Patient's 30-day BP average is above goal of <130/<80 mmHg.       INTERVENTION(S)  reviewed appropriate dose schedule, recommended med change and encouragement/support    PLAN  patient verbalizes understanding, demonstrates understanding via teach back, patient amenable to changes and continue monitoring    Continue current medications. Start carvedilol 6.25 mg BID      There are no preventive care reminders to display for this patient.    Last 5 Patient Entered Readings                                      Current 30 Day Average: 164/90     Recent Readings 7/6/2020 7/1/2020 6/8/2020 5/21/2020 5/19/2020    SBP (mmHg) 166 169 157 147 147    DBP (mmHg) 88 94 89 56 82    Pulse 89 91 113 78 81             Hypertension Medications     amlodipine-valsartan (EXFORGE)  mg per tablet Take 1 tablet by mouth every evening.    carvediloL (COREG) 6.25 MG tablet Take 1 tablet (6.25 mg total) by mouth 2 (two) times daily with meals.    cloNIDine (CATAPRES) 0.1 MG tablet Take 1 tablet (0.1 mg total) by mouth every evening.    valsartan (DIOVAN) 160 MG tablet Take 1 tablet (160 mg total) by mouth once daily.                   "

## 2020-07-09 NOTE — TELEPHONE ENCOUNTER
----- Message from Angela Rivera sent at 7/9/2020  1:12 PM CDT -----  Contact: [facility] [phone]  Requesting an RX refill or new RX.  Is this a refill or new RX:  refill  RX name and strength: pantoprazole (PROTONIX) 40 MG tablet  Directions (copy/paste from chart):    Is this a 30 day or 90 day RX:  90  Local pharmacy or mail order pharmacy:  local  Pharmacy name and phone # (copy/paste from chart):   [facility] - [facility] - [address]  Comments:

## 2020-07-10 RX ORDER — PANTOPRAZOLE SODIUM 40 MG/1
40 TABLET, DELAYED RELEASE ORAL DAILY
Qty: 90 TABLET | Refills: 3 | Status: SHIPPED | OUTPATIENT
Start: 2020-07-10 | End: 2021-07-02

## 2020-07-17 ENCOUNTER — TELEPHONE (OUTPATIENT)
Dept: RADIOLOGY | Facility: HOSPITAL | Age: 55
End: 2020-07-17

## 2020-07-21 ENCOUNTER — PATIENT OUTREACH (OUTPATIENT)
Dept: OTHER | Facility: OTHER | Age: 55
End: 2020-07-21

## 2020-07-21 ENCOUNTER — TELEPHONE (OUTPATIENT)
Dept: SURGERY | Facility: CLINIC | Age: 55
End: 2020-07-21

## 2020-07-21 DIAGNOSIS — R92.8 ABNORMAL MAMMOGRAM: Primary | ICD-10-CM

## 2020-07-21 DIAGNOSIS — I10 HYPERTENSION, UNSPECIFIED TYPE: ICD-10-CM

## 2020-07-21 NOTE — TELEPHONE ENCOUNTER
Patient called stating that she would like to cancel scheduled breast surgery at this time, patient states that she is concerned about current COVID-19 situation in [place] and that she would have to be away from her family for two weeks if she is possibly exposed, advised patient of the COVID-19 precautions across [facility] facilities at this time, pt states she is opting for 6 month follow up appointment with repeat right diagnostic mammogram, surgery case placed in the depot, pre-op and post op appt's canceled, recall placed, MD notified

## 2020-08-06 RX ORDER — LEVOTHYROXINE SODIUM 112 UG/1
TABLET ORAL
Qty: 90 TABLET | Refills: 3 | Status: SHIPPED | OUTPATIENT
Start: 2020-08-06 | End: 2021-07-26

## 2020-09-08 RX ORDER — CARVEDILOL 12.5 MG/1
12.5 TABLET ORAL 2 TIMES DAILY WITH MEALS
Qty: 60 TABLET | Refills: 5 | Status: SHIPPED | OUTPATIENT
Start: 2020-09-08 | End: 2020-09-29 | Stop reason: SDUPTHER

## 2020-09-08 NOTE — PROGRESS NOTES
Digital Medicine: Clinician Follow-Up    Called patient for follow up. Overall, she is doing well. Patient admits that she needs to get more exercise. She has been doing pilates via zoom two times per week, but says that she used to do more walking prior to the covid pandemic. She says she has no motivation to exercise. She confirms taking carvedilol as prescribed and denies medication- related issues.     The history is provided by the patient.      Review of patient's allergies indicates:   -- No known drug allergies   Follow-up reason(s): medication change follow-up.     Patient did make medication change.    Is patient tolerating med change? yes          Last 5 Patient Entered Readings                                      Current 30 Day Average: 152/78     Recent Readings 9/3/2020 7/23/2020 7/21/2020 7/17/2020 7/6/2020    SBP (mmHg) 152 160 154 139 166    DBP (mmHg) 78 79 74 82 88    Pulse 72 83 82 76 89               Depression Screening  Hodan Hair screened negative on the depression screening.     Sleep Apnea Screening  Patient not previously diagnosed with CRISTA       Medication Affordability Screening  Did not address medication affordability screening.     Medication Adherence-Medication adherence was asssessed.  Patient continue taking medication as prescribed.            ASSESSMENT(S)  Patients BP average is 152/78 mmHg, which is above goal. Patient's BP goal is less than or equal to 130/80 per 2017 ACC/AHA Hypertension Guidelines.       Hypertension Plan  Medication change. Increase carvedilol to 12.5 mg BID  Additional monitoring needed.  Continue current diet/physical activity routine.  Provided patient education.  Encouraged low sodium diet and regular physical activity     Addressed any questions or concerns and patient has my contact information if needed prior to next outreach. Patient verbalizes understanding.            There are no preventive care reminders to display for this  patient.      Hypertension Medications     amlodipine-valsartan (EXFORGE)  mg per tablet TAKE ONE TABLET BY MOUTH EVERY EVENING    carvediloL (COREG) 6.25 MG tablet Take 1 tablet (6.25 mg total) by mouth 2 (two) times daily with meals.    cloNIDine (CATAPRES) 0.1 MG tablet TAKE ONE TABLET BY MOUTH EVERY EVENING    valsartan (DIOVAN) 160 MG tablet Take 1 tablet (160 mg total) by mouth once daily.

## 2020-09-17 ENCOUNTER — PATIENT MESSAGE (OUTPATIENT)
Dept: ADMINISTRATIVE | Facility: HOSPITAL | Age: 55
End: 2020-09-17

## 2020-09-18 ENCOUNTER — PATIENT OUTREACH (OUTPATIENT)
Dept: OTHER | Facility: OTHER | Age: 55
End: 2020-09-18

## 2020-09-18 DIAGNOSIS — I10 HYPERTENSION, UNSPECIFIED TYPE: ICD-10-CM

## 2020-09-29 RX ORDER — CARVEDILOL 25 MG/1
25 TABLET ORAL 2 TIMES DAILY WITH MEALS
Qty: 60 TABLET | Refills: 5 | Status: SHIPPED | OUTPATIENT
Start: 2020-09-29 | End: 2021-03-22

## 2020-09-29 NOTE — PROGRESS NOTES
Digital Medicine: Clinician Follow-Up    Called patient for follow up after increasing carvedilol to 12.5 mg BID. Patient says that she is doing well. She says that she has been tired and contributes this to the carvedilol. She says that she was tired after initially starting the carvedilol but it resolved after about three weeks.     The history is provided by the patient.      Review of patient's allergies indicates:   -- No known drug allergies   Follow-up reason(s): medication change follow-up.     Hypertension    Patient readings are stable   Patient did make medication change.    Is patient tolerating med change? yes          Last 5 Patient Entered Readings                                      Current 30 Day Average: 146/77     Recent Readings 9/25/2020 9/15/2020 9/9/2020 9/3/2020 7/23/2020    SBP (mmHg) 148 140 143 152 160    DBP (mmHg) 78 77 74 78 79    Pulse 73 76 73 72 83                 Depression Screening  Did not address depression screening.    Sleep Apnea Screening    Did not address sleep apnea screening.     Medication Affordability Screening  Did not address medication affordability screening.     Medication Adherence-Medication adherence was asssessed.  Patient continue taking medication as prescribed.            ASSESSMENT(S)  Patients BP average is 146/77 mmHg, which is above goal. Patient's BP goal is less than or equal to 130/80 per 2017 ACC/AHA Hypertension Guidelines.     Hypertension Plan  Medication change. Increase carvedilol to 25 mg BID  Provided patient education.  Monitor fatigue and report if worsens or fails to improve     Addressed patient questions and patient has my contact information if needed prior to next outreach. Patient verbalizes understanding.            There are no preventive care reminders to display for this patient.  There are no preventive care reminders to display for this patient.    Hypertension Medications     amlodipine-valsartan (EXFORGE)  mg per  tablet TAKE ONE TABLET BY MOUTH EVERY EVENING    carvediloL (COREG) 25 MG tablet Take 1 tablet (25 mg total) by mouth 2 (two) times daily with meals.    cloNIDine (CATAPRES) 0.1 MG tablet TAKE ONE TABLET BY MOUTH EVERY EVENING    valsartan (DIOVAN) 160 MG tablet Take 1 tablet (160 mg total) by mouth once daily.

## 2020-10-13 ENCOUNTER — PATIENT OUTREACH (OUTPATIENT)
Dept: OTHER | Facility: OTHER | Age: 55
End: 2020-10-13

## 2020-11-25 ENCOUNTER — PATIENT MESSAGE (OUTPATIENT)
Dept: SURGERY | Facility: CLINIC | Age: 55
End: 2020-11-25

## 2020-12-08 NOTE — PROGRESS NOTES
Digital Medicine: Clinician Follow-Up    Called patient for follow up after increasing carvedilol. She says that she doesn't necessarily take carvedilol with food. She says that for the first month after the dose increase, she felt very tired. She says that she is feeling better now.     Patient complains of dry mouth that has increased in frequency over the past 6-8 months. She says that she has had dry mouth in the past. She reports taking antihistamines occasionally.     The history is provided by the patient.   Follow-up reason(s): medication change follow-up.     Hypertension    Patient's blood pressure is stable.   Patient did make medication change.    Is patient tolerating med change? yes            Last 5 Patient Entered Readings                                      Current 30 Day Average: 147/75     Recent Readings 12/7/2020 11/29/2020 11/28/2020 11/28/2020 11/25/2020    SBP (mmHg) 125 163 162 153 139    DBP (mmHg) 70 78 81 79 67    Pulse 70 72 70 67 78                 Depression Screening  Did not address depression screening.    Sleep Apnea Screening    Did not address sleep apnea screening.     Medication Affordability Screening  Did not address medication affordability screening.     Medication Adherence-Medication adherence was asssessed.  Patient continue taking medication as prescribed.            ASSESSMENT(S)  Patients BP average is 147/75 mmHg, which is above goal. Patient's BP goal is less than or equal to 130/80.     Hypertension Plan  Additional monitoring needed.  Continue current therapy. Take carvedilol with food about 12 hours apart       Addressed patient questions and patient has my contact information if needed prior to next outreach. Patient verbalizes understanding.             There are no preventive care reminders to display for this patient.  There are no preventive care reminders to display for this patient.      Hypertension Medications     amlodipine-valsartan (EXFORGE)   mg per tablet TAKE ONE TABLET BY MOUTH EVERY EVENING    carvediloL (COREG) 25 MG tablet Take 1 tablet (25 mg total) by mouth 2 (two) times daily with meals.    cloNIDine (CATAPRES) 0.1 MG tablet TAKE ONE TABLET BY MOUTH EVERY EVENING    valsartan (DIOVAN) 160 MG tablet Take 1 tablet (160 mg total) by mouth once daily.

## 2020-12-09 ENCOUNTER — HOSPITAL ENCOUNTER (OUTPATIENT)
Dept: RADIOLOGY | Facility: HOSPITAL | Age: 55
Discharge: HOME | End: 2020-12-09
Attending: SURGERY
Payer: COMMERCIAL

## 2020-12-09 DIAGNOSIS — R92.8 ABNORMAL MAMMOGRAM: ICD-10-CM

## 2020-12-09 PROCEDURE — 77061 BREAST TOMOSYNTHESIS UNI: CPT | Mod: 26,RT,, | Performed by: RADIOLOGY

## 2020-12-09 PROCEDURE — 77065 DX MAMMO INCL CAD UNI: CPT | Mod: TC,RT

## 2020-12-09 PROCEDURE — 77061 BREAST TOMOSYNTHESIS UNI: CPT | Mod: TC,RT

## 2020-12-09 PROCEDURE — 77065 DX MAMMO INCL CAD UNI: CPT | Mod: 26,RT,, | Performed by: RADIOLOGY

## 2020-12-09 PROCEDURE — 77061 MAMMO DIGITAL DIAGNOSTIC RIGHT WITH TOMOSYNTHESIS_CAD: ICD-10-PCS | Mod: 26,RT,, | Performed by: RADIOLOGY

## 2020-12-09 PROCEDURE — 77065 MAMMO DIGITAL DIAGNOSTIC RIGHT WITH TOMOSYNTHESIS_CAD: ICD-10-PCS | Mod: 26,RT,, | Performed by: RADIOLOGY

## 2020-12-11 ENCOUNTER — TELEPHONE (OUTPATIENT)
Dept: SURGERY | Facility: CLINIC | Age: 55
End: 2020-12-11

## 2020-12-11 NOTE — TELEPHONE ENCOUNTER
Spoke with patient and he has decided to not proceed with surgery.  Patient had mammogram on 12/9/20.  She is going have the follow up mammograms as recommended by the radiologist.  Patient instructed to call with any concerns.

## 2021-01-04 ENCOUNTER — PATIENT MESSAGE (OUTPATIENT)
Dept: ADMINISTRATIVE | Facility: HOSPITAL | Age: 56
End: 2021-01-04

## 2021-01-14 RX ORDER — AMLODIPINE AND VALSARTAN 10; 160 MG/1; MG/1
TABLET ORAL
Qty: 90 TABLET | Refills: 3 | Status: SHIPPED | OUTPATIENT
Start: 2021-01-14 | End: 2021-06-01 | Stop reason: RX

## 2021-04-05 ENCOUNTER — PATIENT MESSAGE (OUTPATIENT)
Dept: ADMINISTRATIVE | Facility: HOSPITAL | Age: 56
End: 2021-04-05

## 2021-04-15 ENCOUNTER — TELEPHONE (OUTPATIENT)
Dept: INTERNAL MEDICINE | Facility: CLINIC | Age: 56
End: 2021-04-15

## 2021-04-15 DIAGNOSIS — Z00.00 ANNUAL PHYSICAL EXAM: Primary | ICD-10-CM

## 2021-05-11 ENCOUNTER — PATIENT OUTREACH (OUTPATIENT)
Dept: ADMINISTRATIVE | Facility: OTHER | Age: 56
End: 2021-05-11

## 2021-05-12 ENCOUNTER — OFFICE VISIT (OUTPATIENT)
Dept: OBSTETRICS AND GYNECOLOGY | Facility: CLINIC | Age: 56
End: 2021-05-12
Payer: COMMERCIAL

## 2021-05-12 VITALS — WEIGHT: 253.5 LBS | SYSTOLIC BLOOD PRESSURE: 158 MMHG | BODY MASS INDEX: 41.55 KG/M2 | DIASTOLIC BLOOD PRESSURE: 76 MMHG

## 2021-05-12 DIAGNOSIS — Z01.419 WELL WOMAN EXAM WITH ROUTINE GYNECOLOGICAL EXAM: Primary | ICD-10-CM

## 2021-05-12 PROCEDURE — 99396 PREV VISIT EST AGE 40-64: CPT | Mod: S$GLB,,, | Performed by: OBSTETRICS & GYNECOLOGY

## 2021-05-12 PROCEDURE — 3008F BODY MASS INDEX DOCD: CPT | Mod: CPTII,S$GLB,, | Performed by: OBSTETRICS & GYNECOLOGY

## 2021-05-12 PROCEDURE — 99396 PR PREVENTIVE VISIT,EST,40-64: ICD-10-PCS | Mod: S$GLB,,, | Performed by: OBSTETRICS & GYNECOLOGY

## 2021-05-12 PROCEDURE — 99999 PR PBB SHADOW E&M-EST. PATIENT-LVL III: ICD-10-PCS | Mod: PBBFAC,,, | Performed by: OBSTETRICS & GYNECOLOGY

## 2021-05-12 PROCEDURE — 1126F AMNT PAIN NOTED NONE PRSNT: CPT | Mod: S$GLB,,, | Performed by: OBSTETRICS & GYNECOLOGY

## 2021-05-12 PROCEDURE — 1126F PR PAIN SEVERITY QUANTIFIED, NO PAIN PRESENT: ICD-10-PCS | Mod: S$GLB,,, | Performed by: OBSTETRICS & GYNECOLOGY

## 2021-05-12 PROCEDURE — 99999 PR PBB SHADOW E&M-EST. PATIENT-LVL III: CPT | Mod: PBBFAC,,, | Performed by: OBSTETRICS & GYNECOLOGY

## 2021-05-12 PROCEDURE — 3008F PR BODY MASS INDEX (BMI) DOCUMENTED: ICD-10-PCS | Mod: CPTII,S$GLB,, | Performed by: OBSTETRICS & GYNECOLOGY

## 2021-06-01 ENCOUNTER — PATIENT MESSAGE (OUTPATIENT)
Dept: INTERNAL MEDICINE | Facility: CLINIC | Age: 56
End: 2021-06-01

## 2021-06-01 DIAGNOSIS — Z00.00 ANNUAL PHYSICAL EXAM: Primary | ICD-10-CM

## 2021-06-09 LAB
ALBUMIN SERPL-MCNC: 4.1 G/DL (ref 3.6–5.1)
ALBUMIN/CREAT UR: 4 MCG/MG CREAT
ALBUMIN/GLOB SERPL: 1.3 (CALC) (ref 1–2.5)
ALP SERPL-CCNC: 132 U/L (ref 37–153)
ALT SERPL-CCNC: 20 U/L (ref 6–29)
APPEARANCE UR: CLEAR
AST SERPL-CCNC: 18 U/L (ref 10–35)
BASOPHILS # BLD AUTO: 31 CELLS/UL (ref 0–200)
BASOPHILS NFR BLD AUTO: 0.4 %
BILIRUB SERPL-MCNC: 0.5 MG/DL (ref 0.2–1.2)
BILIRUB UR QL STRIP: NEGATIVE
BUN SERPL-MCNC: 16 MG/DL (ref 7–25)
BUN/CREAT SERPL: 15 (CALC) (ref 6–22)
CALCIUM SERPL-MCNC: 9.4 MG/DL (ref 8.6–10.4)
CHLORIDE SERPL-SCNC: 101 MMOL/L (ref 98–110)
CHOLEST SERPL-MCNC: 206 MG/DL
CHOLEST/HDLC SERPL: 4 (CALC)
CO2 SERPL-SCNC: 28 MMOL/L (ref 20–32)
COLOR UR: ABNORMAL
CREAT SERPL-MCNC: 1.1 MG/DL (ref 0.5–1.05)
CREAT UR-MCNC: 261 MG/DL (ref 20–275)
EOSINOPHIL # BLD AUTO: 31 CELLS/UL (ref 15–500)
EOSINOPHIL NFR BLD AUTO: 0.4 %
ERYTHROCYTE [DISTWIDTH] IN BLOOD BY AUTOMATED COUNT: 13.3 % (ref 11–15)
GLOBULIN SER CALC-MCNC: 3.2 G/DL (CALC) (ref 1.9–3.7)
GLUCOSE SERPL-MCNC: 149 MG/DL (ref 65–99)
GLUCOSE UR QL STRIP: NEGATIVE
HBA1C MFR BLD: 6.9 % OF TOTAL HGB
HCT VFR BLD AUTO: 35.4 % (ref 35–45)
HDLC SERPL-MCNC: 51 MG/DL
HGB BLD-MCNC: 11.4 G/DL (ref 11.7–15.5)
HGB UR QL STRIP: NEGATIVE
KETONES UR QL STRIP: ABNORMAL
LDLC SERPL CALC-MCNC: 118 MG/DL (CALC)
LEUKOCYTE ESTERASE UR QL STRIP: ABNORMAL
LYMPHOCYTES # BLD AUTO: 2549 CELLS/UL (ref 850–3900)
LYMPHOCYTES NFR BLD AUTO: 33.1 %
MCH RBC QN AUTO: 27.9 PG (ref 27–33)
MCHC RBC AUTO-ENTMCNC: 32.2 G/DL (ref 32–36)
MCV RBC AUTO: 86.6 FL (ref 80–100)
MICROALBUMIN UR-MCNC: 1 MG/DL
MONOCYTES # BLD AUTO: 477 CELLS/UL (ref 200–950)
MONOCYTES NFR BLD AUTO: 6.2 %
NEUTROPHILS # BLD AUTO: 4612 CELLS/UL (ref 1500–7800)
NEUTROPHILS NFR BLD AUTO: 59.9 %
NITRITE UR QL STRIP: NEGATIVE
NONHDLC SERPL-MCNC: 155 MG/DL (CALC)
PH UR STRIP: 5.5 [PH] (ref 5–8)
PLATELET # BLD AUTO: 339 THOUSAND/UL (ref 140–400)
PMV BLD REES-ECKER: 9 FL (ref 7.5–12.5)
POTASSIUM SERPL-SCNC: 4.6 MMOL/L (ref 3.5–5.3)
PROT SERPL-MCNC: 7.3 G/DL (ref 6.1–8.1)
PROT UR QL STRIP: ABNORMAL
RBC # BLD AUTO: 4.09 MILLION/UL (ref 3.8–5.1)
SODIUM SERPL-SCNC: 138 MMOL/L (ref 135–146)
SP GR UR STRIP: 1.03 (ref 1–1.03)
T4 FREE SERPL-MCNC: 1.3 NG/DL (ref 0.8–1.8)
TRIGL SERPL-MCNC: 261 MG/DL
TSH SERPL-ACNC: 4.75 MIU/L
WBC # BLD AUTO: 7.7 THOUSAND/UL (ref 3.8–10.8)

## 2021-06-18 ENCOUNTER — OFFICE VISIT (OUTPATIENT)
Dept: INTERNAL MEDICINE | Facility: CLINIC | Age: 56
End: 2021-06-18
Payer: COMMERCIAL

## 2021-06-18 VITALS
DIASTOLIC BLOOD PRESSURE: 86 MMHG | SYSTOLIC BLOOD PRESSURE: 152 MMHG | TEMPERATURE: 98 F | HEIGHT: 66 IN | BODY MASS INDEX: 40.67 KG/M2 | HEART RATE: 70 BPM | WEIGHT: 253.06 LBS | RESPIRATION RATE: 20 BRPM

## 2021-06-18 DIAGNOSIS — N18.31 STAGE 3A CHRONIC KIDNEY DISEASE: ICD-10-CM

## 2021-06-18 DIAGNOSIS — Z12.11 COLON CANCER SCREENING: ICD-10-CM

## 2021-06-18 DIAGNOSIS — K21.9 GASTROESOPHAGEAL REFLUX DISEASE, UNSPECIFIED WHETHER ESOPHAGITIS PRESENT: ICD-10-CM

## 2021-06-18 DIAGNOSIS — I10 ESSENTIAL HYPERTENSION: ICD-10-CM

## 2021-06-18 DIAGNOSIS — G25.81 RESTLESS LEG SYNDROME: ICD-10-CM

## 2021-06-18 DIAGNOSIS — Z00.00 WELL ADULT EXAM: Primary | ICD-10-CM

## 2021-06-18 DIAGNOSIS — F90.2 ATTENTION DEFICIT HYPERACTIVITY DISORDER (ADHD), COMBINED TYPE: ICD-10-CM

## 2021-06-18 DIAGNOSIS — E11.9 TYPE 2 DIABETES MELLITUS WITHOUT COMPLICATION, WITHOUT LONG-TERM CURRENT USE OF INSULIN: ICD-10-CM

## 2021-06-18 PROCEDURE — 99999 PR PBB SHADOW E&M-EST. PATIENT-LVL V: CPT | Mod: PBBFAC,,, | Performed by: INTERNAL MEDICINE

## 2021-06-18 PROCEDURE — 99396 PR PREVENTIVE VISIT,EST,40-64: ICD-10-PCS | Mod: S$GLB,,, | Performed by: INTERNAL MEDICINE

## 2021-06-18 PROCEDURE — 99999 PR PBB SHADOW E&M-EST. PATIENT-LVL V: ICD-10-PCS | Mod: PBBFAC,,, | Performed by: INTERNAL MEDICINE

## 2021-06-18 PROCEDURE — 99396 PREV VISIT EST AGE 40-64: CPT | Mod: S$GLB,,, | Performed by: INTERNAL MEDICINE

## 2021-06-22 ENCOUNTER — TELEPHONE (OUTPATIENT)
Dept: DIABETES | Facility: CLINIC | Age: 56
End: 2021-06-22

## 2021-07-01 ENCOUNTER — PATIENT MESSAGE (OUTPATIENT)
Dept: OBSTETRICS AND GYNECOLOGY | Facility: CLINIC | Age: 56
End: 2021-07-01

## 2021-07-01 DIAGNOSIS — Z12.31 BREAST CANCER SCREENING BY MAMMOGRAM: Primary | ICD-10-CM

## 2021-07-06 ENCOUNTER — PATIENT MESSAGE (OUTPATIENT)
Dept: ADMINISTRATIVE | Facility: HOSPITAL | Age: 56
End: 2021-07-06

## 2021-07-09 ENCOUNTER — PATIENT OUTREACH (OUTPATIENT)
Dept: ADMINISTRATIVE | Facility: HOSPITAL | Age: 56
End: 2021-07-09

## 2021-07-09 DIAGNOSIS — Z12.11 COLON CANCER SCREENING: Primary | ICD-10-CM

## 2021-07-14 ENCOUNTER — HOSPITAL ENCOUNTER (OUTPATIENT)
Dept: RADIOLOGY | Facility: HOSPITAL | Age: 56
Discharge: HOME | End: 2021-07-14
Attending: OBSTETRICS & GYNECOLOGY
Payer: COMMERCIAL

## 2021-07-14 DIAGNOSIS — Z12.31 BREAST CANCER SCREENING BY MAMMOGRAM: ICD-10-CM

## 2021-07-14 PROCEDURE — 77067 SCR MAMMO BI INCL CAD: CPT | Mod: 26,,, | Performed by: RADIOLOGY

## 2021-07-14 PROCEDURE — 77063 BREAST TOMOSYNTHESIS BI: CPT | Mod: 26,,, | Performed by: RADIOLOGY

## 2021-07-14 PROCEDURE — 77067 MAMMO DIGITAL SCREENING BILAT WITH TOMO: ICD-10-PCS | Mod: 26,,, | Performed by: RADIOLOGY

## 2021-07-14 PROCEDURE — 77063 MAMMO DIGITAL SCREENING BILAT WITH TOMO: ICD-10-PCS | Mod: 26,,, | Performed by: RADIOLOGY

## 2021-07-14 PROCEDURE — 77067 SCR MAMMO BI INCL CAD: CPT | Mod: TC,PO

## 2021-07-22 ENCOUNTER — HOSPITAL ENCOUNTER (OUTPATIENT)
Dept: RADIOLOGY | Facility: HOSPITAL | Age: 56
Discharge: HOME | End: 2021-07-22
Attending: INTERNAL MEDICINE
Payer: COMMERCIAL

## 2021-07-22 DIAGNOSIS — N18.31 STAGE 3A CHRONIC KIDNEY DISEASE: ICD-10-CM

## 2021-07-22 PROCEDURE — 76770 US EXAM ABDO BACK WALL COMP: CPT | Mod: TC

## 2021-07-22 PROCEDURE — 76770 US EXAM ABDO BACK WALL COMP: CPT | Mod: 26,,, | Performed by: RADIOLOGY

## 2021-07-22 PROCEDURE — 76770 US RETROPERITONEAL COMPLETE: ICD-10-PCS | Mod: 26,,, | Performed by: RADIOLOGY

## 2021-07-26 ENCOUNTER — TELEPHONE (OUTPATIENT)
Dept: ADMINISTRATIVE | Facility: HOSPITAL | Age: 56
End: 2021-07-26

## 2021-07-28 ENCOUNTER — PATIENT MESSAGE (OUTPATIENT)
Dept: ADMINISTRATIVE | Facility: HOSPITAL | Age: 56
End: 2021-07-28

## 2021-07-28 ENCOUNTER — PATIENT OUTREACH (OUTPATIENT)
Dept: ADMINISTRATIVE | Facility: HOSPITAL | Age: 56
End: 2021-07-28

## 2021-09-14 DIAGNOSIS — I10 HYPERTENSION, UNSPECIFIED TYPE: ICD-10-CM

## 2021-09-15 RX ORDER — CARVEDILOL 25 MG/1
TABLET ORAL
Qty: 60 TABLET | Refills: 5 | Status: SHIPPED | OUTPATIENT
Start: 2021-09-15 | End: 2022-02-25

## 2021-09-20 ENCOUNTER — PATIENT OUTREACH (OUTPATIENT)
Dept: ADMINISTRATIVE | Facility: HOSPITAL | Age: 56
End: 2021-09-20

## 2021-09-20 ENCOUNTER — PATIENT MESSAGE (OUTPATIENT)
Dept: ADMINISTRATIVE | Facility: HOSPITAL | Age: 56
End: 2021-09-20

## 2021-10-15 ENCOUNTER — PATIENT MESSAGE (OUTPATIENT)
Dept: INTERNAL MEDICINE | Facility: CLINIC | Age: 56
End: 2021-10-15
Payer: COMMERCIAL

## 2021-10-15 DIAGNOSIS — I10 ESSENTIAL HYPERTENSION: Primary | ICD-10-CM

## 2021-10-15 DIAGNOSIS — E11.9 TYPE 2 DIABETES MELLITUS WITHOUT COMPLICATION, WITHOUT LONG-TERM CURRENT USE OF INSULIN: ICD-10-CM

## 2021-10-15 DIAGNOSIS — N18.31 STAGE 3A CHRONIC KIDNEY DISEASE: ICD-10-CM

## 2021-10-19 LAB
ALBUMIN SERPL-MCNC: 4.1 G/DL (ref 3.6–5.1)
ALBUMIN/GLOB SERPL: 1.2 (CALC) (ref 1–2.5)
ALP SERPL-CCNC: 135 U/L (ref 37–153)
ALT SERPL-CCNC: 17 U/L (ref 6–29)
AST SERPL-CCNC: 21 U/L (ref 10–35)
BASOPHILS # BLD AUTO: 27 CELLS/UL (ref 0–200)
BASOPHILS NFR BLD AUTO: 0.3 %
BILIRUB SERPL-MCNC: 0.5 MG/DL (ref 0.2–1.2)
BUN SERPL-MCNC: 20 MG/DL (ref 7–25)
BUN/CREAT SERPL: 17 (CALC) (ref 6–22)
CALCIUM SERPL-MCNC: 9.6 MG/DL (ref 8.6–10.4)
CHLORIDE SERPL-SCNC: 98 MMOL/L (ref 98–110)
CHOLEST SERPL-MCNC: 202 MG/DL
CHOLEST/HDLC SERPL: 4.1 (CALC)
CO2 SERPL-SCNC: 27 MMOL/L (ref 20–32)
CREAT SERPL-MCNC: 1.15 MG/DL (ref 0.5–1.05)
EOSINOPHIL # BLD AUTO: 209 CELLS/UL (ref 15–500)
EOSINOPHIL NFR BLD AUTO: 2.3 %
ERYTHROCYTE [DISTWIDTH] IN BLOOD BY AUTOMATED COUNT: 13.3 % (ref 11–15)
GLOBULIN SER CALC-MCNC: 3.4 G/DL (CALC) (ref 1.9–3.7)
GLUCOSE SERPL-MCNC: 141 MG/DL (ref 65–99)
HBA1C MFR BLD: 7.2 % OF TOTAL HGB
HCT VFR BLD AUTO: 37.3 % (ref 35–45)
HDLC SERPL-MCNC: 49 MG/DL
HGB BLD-MCNC: 12.5 G/DL (ref 11.7–15.5)
LDLC SERPL CALC-MCNC: 112 MG/DL (CALC)
LYMPHOCYTES # BLD AUTO: 3167 CELLS/UL (ref 850–3900)
LYMPHOCYTES NFR BLD AUTO: 34.8 %
MCH RBC QN AUTO: 27.8 PG (ref 27–33)
MCHC RBC AUTO-ENTMCNC: 33.5 G/DL (ref 32–36)
MCV RBC AUTO: 82.9 FL (ref 80–100)
MONOCYTES # BLD AUTO: 573 CELLS/UL (ref 200–950)
MONOCYTES NFR BLD AUTO: 6.3 %
NEUTROPHILS # BLD AUTO: 5123 CELLS/UL (ref 1500–7800)
NEUTROPHILS NFR BLD AUTO: 56.3 %
NONHDLC SERPL-MCNC: 153 MG/DL (CALC)
PLATELET # BLD AUTO: 388 THOUSAND/UL (ref 140–400)
PMV BLD REES-ECKER: 8.9 FL (ref 7.5–12.5)
POTASSIUM SERPL-SCNC: 3.9 MMOL/L (ref 3.5–5.3)
PROT SERPL-MCNC: 7.5 G/DL (ref 6.1–8.1)
RBC # BLD AUTO: 4.5 MILLION/UL (ref 3.8–5.1)
SODIUM SERPL-SCNC: 136 MMOL/L (ref 135–146)
T4 FREE SERPL-MCNC: 1.7 NG/DL (ref 0.8–1.8)
TRIGL SERPL-MCNC: 285 MG/DL
TSH SERPL-ACNC: 0.96 MIU/L (ref 0.4–4.5)
WBC # BLD AUTO: 9.1 THOUSAND/UL (ref 3.8–10.8)

## 2021-10-21 ENCOUNTER — OFFICE VISIT (OUTPATIENT)
Dept: INTERNAL MEDICINE | Facility: CLINIC | Age: 56
End: 2021-10-21
Payer: COMMERCIAL

## 2021-10-21 ENCOUNTER — PATIENT MESSAGE (OUTPATIENT)
Dept: INTERNAL MEDICINE | Facility: CLINIC | Age: 56
End: 2021-10-21

## 2021-10-21 VITALS
DIASTOLIC BLOOD PRESSURE: 72 MMHG | SYSTOLIC BLOOD PRESSURE: 136 MMHG | HEART RATE: 84 BPM | TEMPERATURE: 98 F | RESPIRATION RATE: 16 BRPM | BODY MASS INDEX: 39.3 KG/M2 | WEIGHT: 235.88 LBS | HEIGHT: 65 IN

## 2021-10-21 DIAGNOSIS — K21.9 GASTROESOPHAGEAL REFLUX DISEASE, UNSPECIFIED WHETHER ESOPHAGITIS PRESENT: ICD-10-CM

## 2021-10-21 DIAGNOSIS — I10 ESSENTIAL HYPERTENSION: ICD-10-CM

## 2021-10-21 DIAGNOSIS — N18.31 STAGE 3A CHRONIC KIDNEY DISEASE: ICD-10-CM

## 2021-10-21 DIAGNOSIS — E11.9 TYPE 2 DIABETES MELLITUS WITHOUT COMPLICATION, WITHOUT LONG-TERM CURRENT USE OF INSULIN: Primary | ICD-10-CM

## 2021-10-21 PROCEDURE — 3061F PR NEG MICROALBUMINURIA RESULT DOCUMENTED/REVIEW: ICD-10-PCS | Mod: CPTII,S$GLB,, | Performed by: INTERNAL MEDICINE

## 2021-10-21 PROCEDURE — 3066F PR DOCUMENTATION OF TREATMENT FOR NEPHROPATHY: ICD-10-PCS | Mod: CPTII,S$GLB,, | Performed by: INTERNAL MEDICINE

## 2021-10-21 PROCEDURE — 4010F ACE/ARB THERAPY RXD/TAKEN: CPT | Mod: CPTII,S$GLB,, | Performed by: INTERNAL MEDICINE

## 2021-10-21 PROCEDURE — 99999 PR PBB SHADOW E&M-EST. PATIENT-LVL V: CPT | Mod: PBBFAC,,, | Performed by: INTERNAL MEDICINE

## 2021-10-21 PROCEDURE — 3061F NEG MICROALBUMINURIA REV: CPT | Mod: CPTII,S$GLB,, | Performed by: INTERNAL MEDICINE

## 2021-10-21 PROCEDURE — 99999 PR PBB SHADOW E&M-EST. PATIENT-LVL V: ICD-10-PCS | Mod: PBBFAC,,, | Performed by: INTERNAL MEDICINE

## 2021-10-21 PROCEDURE — 99214 PR OFFICE/OUTPT VISIT, EST, LEVL IV, 30-39 MIN: ICD-10-PCS | Mod: S$GLB,,, | Performed by: INTERNAL MEDICINE

## 2021-10-21 PROCEDURE — 4010F PR ACE/ARB THEARPY RXD/TAKEN: ICD-10-PCS | Mod: CPTII,S$GLB,, | Performed by: INTERNAL MEDICINE

## 2021-10-21 PROCEDURE — 3066F NEPHROPATHY DOC TX: CPT | Mod: CPTII,S$GLB,, | Performed by: INTERNAL MEDICINE

## 2021-10-21 PROCEDURE — 99214 OFFICE O/P EST MOD 30 MIN: CPT | Mod: S$GLB,,, | Performed by: INTERNAL MEDICINE

## 2021-10-21 RX ORDER — AMLODIPINE BESYLATE 5 MG/1
5 TABLET ORAL NIGHTLY
Qty: 30 TABLET | Refills: 5 | Status: SHIPPED | OUTPATIENT
Start: 2021-10-21 | End: 2022-03-31

## 2021-10-21 RX ORDER — DEXTROAMPHETAMINE SACCHARATE, AMPHETAMINE ASPARTATE, DEXTROAMPHETAMINE SULFATE AND AMPHETAMINE SULFATE 5; 5; 5; 5 MG/1; MG/1; MG/1; MG/1
TABLET ORAL SEE ADMIN INSTRUCTIONS
COMMUNITY
Start: 2021-08-19

## 2021-10-21 RX ORDER — CLONIDINE HYDROCHLORIDE 0.1 MG/1
0.2 TABLET ORAL NIGHTLY
Qty: 60 TABLET | Refills: 5
Start: 2021-10-21 | End: 2021-12-02

## 2021-11-22 ENCOUNTER — PATIENT MESSAGE (OUTPATIENT)
Dept: ADMINISTRATIVE | Facility: OTHER | Age: 56
End: 2021-11-22
Payer: COMMERCIAL

## 2021-11-26 ENCOUNTER — PATIENT MESSAGE (OUTPATIENT)
Dept: INTERNAL MEDICINE | Facility: CLINIC | Age: 56
End: 2021-11-26
Payer: COMMERCIAL

## 2022-02-18 ENCOUNTER — LAB VISIT (OUTPATIENT)
Dept: LAB | Facility: HOSPITAL | Age: 57
End: 2022-02-18
Attending: INTERNAL MEDICINE
Payer: COMMERCIAL

## 2022-02-18 DIAGNOSIS — I10 ESSENTIAL HYPERTENSION: ICD-10-CM

## 2022-02-18 DIAGNOSIS — N18.31 STAGE 3A CHRONIC KIDNEY DISEASE: ICD-10-CM

## 2022-02-18 DIAGNOSIS — E11.9 TYPE 2 DIABETES MELLITUS WITHOUT COMPLICATION, WITHOUT LONG-TERM CURRENT USE OF INSULIN: ICD-10-CM

## 2022-02-18 LAB
ALBUMIN SERPL BCP-MCNC: 3.6 G/DL (ref 3.5–5.2)
ALP SERPL-CCNC: 94 U/L (ref 55–135)
ALT SERPL W/O P-5'-P-CCNC: 15 U/L (ref 10–44)
ANION GAP SERPL CALC-SCNC: 11 MMOL/L (ref 8–16)
AST SERPL-CCNC: 17 U/L (ref 10–40)
BILIRUB SERPL-MCNC: 0.5 MG/DL (ref 0.1–1)
BUN SERPL-MCNC: 14 MG/DL (ref 6–20)
CALCIUM SERPL-MCNC: 9.3 MG/DL (ref 8.7–10.5)
CHLORIDE SERPL-SCNC: 99 MMOL/L (ref 95–110)
CHOLEST SERPL-MCNC: 201 MG/DL (ref 120–199)
CHOLEST/HDLC SERPL: 4.5 {RATIO} (ref 2–5)
CO2 SERPL-SCNC: 29 MMOL/L (ref 23–29)
CREAT SERPL-MCNC: 1.1 MG/DL (ref 0.5–1.4)
EST. GFR  (AFRICAN AMERICAN): >60 ML/MIN/1.73 M^2
EST. GFR  (NON AFRICAN AMERICAN): 56.3 ML/MIN/1.73 M^2
ESTIMATED AVG GLUCOSE: 157 MG/DL (ref 68–131)
GLUCOSE SERPL-MCNC: 164 MG/DL (ref 70–110)
HBA1C MFR BLD: 7.1 % (ref 4–5.6)
HDLC SERPL-MCNC: 45 MG/DL (ref 40–75)
HDLC SERPL: 22.4 % (ref 20–50)
LDLC SERPL CALC-MCNC: 102.6 MG/DL (ref 63–159)
NONHDLC SERPL-MCNC: 156 MG/DL
POTASSIUM SERPL-SCNC: 3.7 MMOL/L (ref 3.5–5.1)
PROT SERPL-MCNC: 7.5 G/DL (ref 6–8.4)
SODIUM SERPL-SCNC: 139 MMOL/L (ref 136–145)
TRIGL SERPL-MCNC: 267 MG/DL (ref 30–150)

## 2022-02-18 PROCEDURE — 80053 COMPREHEN METABOLIC PANEL: CPT | Performed by: INTERNAL MEDICINE

## 2022-02-18 PROCEDURE — 83036 HEMOGLOBIN GLYCOSYLATED A1C: CPT | Performed by: INTERNAL MEDICINE

## 2022-02-18 PROCEDURE — 36415 COLL VENOUS BLD VENIPUNCTURE: CPT | Mod: PO | Performed by: INTERNAL MEDICINE

## 2022-02-18 PROCEDURE — 80061 LIPID PANEL: CPT | Performed by: INTERNAL MEDICINE

## 2022-02-23 DIAGNOSIS — I10 HYPERTENSION, UNSPECIFIED TYPE: ICD-10-CM

## 2022-02-23 NOTE — TELEPHONE ENCOUNTER
No new care gaps identified.  Powered by [employer]. Reference number: [id number].   2/23/2022 8:03:32 AM CST

## 2022-02-25 ENCOUNTER — PATIENT MESSAGE (OUTPATIENT)
Dept: INTERNAL MEDICINE | Facility: CLINIC | Age: 57
End: 2022-02-25
Payer: COMMERCIAL

## 2022-02-25 RX ORDER — CARVEDILOL 25 MG/1
TABLET ORAL
Qty: 180 TABLET | Refills: 2 | Status: SHIPPED | OUTPATIENT
Start: 2022-02-25 | End: 2022-10-26

## 2022-02-25 NOTE — TELEPHONE ENCOUNTER
Refill Authorization Note   Hodan Hair  is requesting a refill authorization.  Brief Assessment and Rationale for Refill:  Approve     Medication Therapy Plan:       Medication Reconciliation Completed: No   Comments:   --->Care Gap information included below if applicable.       Requested Prescriptions   Pending Prescriptions Disp Refills    carvediloL (COREG) 25 MG tablet [Pharmacy Med Name: carvedilol 25 mg tablet] 180 tablet 2     Sig: TAKE ONE TABLET BY MOUTH TWICE DAILY WITH meals       Cardiovascular:  Beta Blockers Passed - 2/23/2022  8:03 AM        Passed - Patient is at least 18 years old        Passed - Last BP in normal range within 360 days     BP Readings from Last 1 Encounters:   10/21/21 136/72               Passed - Last Heart Rate in normal range within 360 days     Pulse Readings from Last 1 Encounters:   10/21/21 84              Passed - Valid encounter within last 15 months     Recent Visits  Date Type Provider Dept   10/21/21 Office Visit Nilson Bahena MD [facility] Internal Medicine   Showing recent visits within past 720 days and meeting all other requirements  Future Appointments  No visits were found meeting these conditions.  Showing future appointments within next 150 days and meeting all other requirements      Future Appointments              Today Nilson Bahena MD [place] [facility] - [facility], [place]                    Appointments  past 12m or future 3m with PCP    Date Provider   Last Visit   10/21/2021 Nilson Bahena MD   Next Visit   2/25/2022 Nilson Bahena MD   ED visits in past 90 days: 0     Note composed:7:58 AM 02/25/2022

## 2022-03-14 ENCOUNTER — OFFICE VISIT (OUTPATIENT)
Dept: INTERNAL MEDICINE | Facility: CLINIC | Age: 57
End: 2022-03-14
Payer: COMMERCIAL

## 2022-03-14 ENCOUNTER — TELEPHONE (OUTPATIENT)
Dept: DIABETES | Facility: CLINIC | Age: 57
End: 2022-03-14
Payer: COMMERCIAL

## 2022-03-14 VITALS
RESPIRATION RATE: 18 BRPM | BODY MASS INDEX: 39.04 KG/M2 | OXYGEN SATURATION: 95 % | WEIGHT: 242.94 LBS | SYSTOLIC BLOOD PRESSURE: 112 MMHG | HEART RATE: 70 BPM | HEIGHT: 66 IN | TEMPERATURE: 98 F | DIASTOLIC BLOOD PRESSURE: 64 MMHG

## 2022-03-14 DIAGNOSIS — I10 ESSENTIAL HYPERTENSION: ICD-10-CM

## 2022-03-14 DIAGNOSIS — K21.9 GASTROESOPHAGEAL REFLUX DISEASE, UNSPECIFIED WHETHER ESOPHAGITIS PRESENT: ICD-10-CM

## 2022-03-14 DIAGNOSIS — M77.8 TENDINITIS OF BOTH ELBOWS: ICD-10-CM

## 2022-03-14 DIAGNOSIS — E11.9 TYPE 2 DIABETES MELLITUS WITHOUT COMPLICATION, WITHOUT LONG-TERM CURRENT USE OF INSULIN: Primary | ICD-10-CM

## 2022-03-14 DIAGNOSIS — E66.01 CLASS 2 SEVERE OBESITY WITH SERIOUS COMORBIDITY AND BODY MASS INDEX (BMI) OF 39.0 TO 39.9 IN ADULT, UNSPECIFIED OBESITY TYPE: ICD-10-CM

## 2022-03-14 PROCEDURE — 99999 PR PBB SHADOW E&M-EST. PATIENT-LVL V: CPT | Mod: PBBFAC,,, | Performed by: INTERNAL MEDICINE

## 2022-03-14 PROCEDURE — 3051F HG A1C>EQUAL 7.0%<8.0%: CPT | Mod: CPTII,S$GLB,, | Performed by: INTERNAL MEDICINE

## 2022-03-14 PROCEDURE — 99999 PR PBB SHADOW E&M-EST. PATIENT-LVL V: ICD-10-PCS | Mod: PBBFAC,,, | Performed by: INTERNAL MEDICINE

## 2022-03-14 PROCEDURE — 99214 PR OFFICE/OUTPT VISIT, EST, LEVL IV, 30-39 MIN: ICD-10-PCS | Mod: S$GLB,,, | Performed by: INTERNAL MEDICINE

## 2022-03-14 PROCEDURE — 3074F PR MOST RECENT SYSTOLIC BLOOD PRESSURE < 130 MM HG: ICD-10-PCS | Mod: CPTII,S$GLB,, | Performed by: INTERNAL MEDICINE

## 2022-03-14 PROCEDURE — 3008F BODY MASS INDEX DOCD: CPT | Mod: CPTII,S$GLB,, | Performed by: INTERNAL MEDICINE

## 2022-03-14 PROCEDURE — 3074F SYST BP LT 130 MM HG: CPT | Mod: CPTII,S$GLB,, | Performed by: INTERNAL MEDICINE

## 2022-03-14 PROCEDURE — 99214 OFFICE O/P EST MOD 30 MIN: CPT | Mod: S$GLB,,, | Performed by: INTERNAL MEDICINE

## 2022-03-14 PROCEDURE — 3078F PR MOST RECENT DIASTOLIC BLOOD PRESSURE < 80 MM HG: ICD-10-PCS | Mod: CPTII,S$GLB,, | Performed by: INTERNAL MEDICINE

## 2022-03-14 PROCEDURE — 3078F DIAST BP <80 MM HG: CPT | Mod: CPTII,S$GLB,, | Performed by: INTERNAL MEDICINE

## 2022-03-14 PROCEDURE — 3051F PR MOST RECENT HEMOGLOBIN A1C LEVEL 7.0 - < 8.0%: ICD-10-PCS | Mod: CPTII,S$GLB,, | Performed by: INTERNAL MEDICINE

## 2022-03-14 PROCEDURE — 3008F PR BODY MASS INDEX (BMI) DOCUMENTED: ICD-10-PCS | Mod: CPTII,S$GLB,, | Performed by: INTERNAL MEDICINE

## 2022-03-14 RX ORDER — PANTOPRAZOLE SODIUM 40 MG/1
40 TABLET, DELAYED RELEASE ORAL DAILY
COMMUNITY
Start: 2021-12-23 | End: 2022-06-16

## 2022-03-14 RX ORDER — METFORMIN HYDROCHLORIDE 500 MG/1
500 TABLET ORAL
Qty: 30 TABLET | Refills: 6 | Status: SHIPPED | OUTPATIENT
Start: 2022-03-14 | End: 2022-07-15

## 2022-03-14 NOTE — PROGRESS NOTES
Subjective:       Patient ID: Hodan Hair is a 56 y.o. female.    Chief Complaint: Follow-up (4 mo) and Elbow Pain (Bilateral elbow pain )    HPI   The patient presents for follow-up of medical conditions which include type 2 diabetes mellitus, hypertension, GERD, chronic kidney disease, obesity.  The patient also has been experiencing bilateral elbow pain particularly with exercising.  She notes pain when she is trying to live weight is during her workouts.    Reflux symptoms have not been relieved with use of Pepcid AC.  The patient resumed Protonix and has had good results with control of her reflux.    She has not been monitoring blood sugar levels.  She does not monitor blood pressure levels.      The patient reports having had inferior turbinate reduction surgical performed by her ENT specialist Dr. Brent Miranda for relief of chronic nasal congestion and to help ameliorate her obstructive sleep apnea.    Review of Systems   Constitutional: Positive for activity change. Negative for appetite change and unexpected weight change.   HENT: Positive for nasal congestion and postnasal drip.    Eyes: Negative for visual disturbance.   Respiratory: Negative for shortness of breath.    Cardiovascular: Negative for chest pain, palpitations and leg swelling.   Gastrointestinal: Negative for abdominal pain, blood in stool and diarrhea.   Genitourinary: Negative for dysuria, frequency, hematuria and urgency.   Musculoskeletal: Positive for arthralgias.   Neurological: Negative for weakness, numbness and headaches.   Psychiatric/Behavioral: Positive for sleep disturbance.            Physical Exam  Vitals and nursing note reviewed.   Constitutional:       General: She is not in acute distress.     Appearance: She is well-developed.      Comments: The patient has gained 7 lb since 10/21/2021.   HENT:      Head: Normocephalic and atraumatic.   Eyes:      General: No scleral icterus.     Conjunctiva/sclera: Conjunctivae  normal.      Pupils: Pupils are equal, round, and reactive to light.   Neck:      Thyroid: No thyromegaly.      Vascular: No JVD.   Cardiovascular:      Rate and Rhythm: Normal rate and regular rhythm.      Heart sounds: Normal heart sounds. No murmur heard.    No friction rub. No gallop.   Pulmonary:      Effort: Pulmonary effort is normal. No respiratory distress.      Breath sounds: Normal breath sounds. No wheezing or rales.   Abdominal:      General: Bowel sounds are normal.      Palpations: Abdomen is soft. There is no mass.      Tenderness: There is no abdominal tenderness.   Musculoskeletal:         General: Tenderness present. Normal range of motion.      Cervical back: Normal range of motion and neck supple.      Comments: Right elbow:  Tenderness is present with range-of-motion testing.  No localized swelling is appreciated.    Left elbow:  Tenderness is present with range-of-motion testing.  No local swelling is present.   Lymphadenopathy:      Cervical: No cervical adenopathy.   Skin:     General: Skin is warm and dry.      Findings: No rash.      Comments: No foot lesions are present.   Neurological:      Mental Status: She is alert and oriented to person, place, and time.      Cranial Nerves: No cranial nerve deficit.      Comments: Sensory exam is intact in both feet on monofilament  testing.   Psychiatric:         Behavior: Behavior normal.         Protective Sensation (w/ 10 gram monofilament):  Right: Intact  Left: Intact    Visual Inspection:  Normal -  Bilateral    Pedal Pulses:   Right: Present  Left: Present    Posterior tibialis:   Right:Present  Left: Present         Lab Visit on 02/18/2022   Component Date Value Ref Range Status    Sodium 02/18/2022 139  136 - 145 mmol/L Final    Potassium 02/18/2022 3.7  3.5 - 5.1 mmol/L Final    Chloride 02/18/2022 99  95 - 110 mmol/L Final    CO2 02/18/2022 29  23 - 29 mmol/L Final    Glucose 02/18/2022 164 (A) 70 - 110 mg/dL Final    BUN  02/18/2022 14  6 - 20 mg/dL Final    Creatinine 02/18/2022 1.1  0.5 - 1.4 mg/dL Final    Calcium 02/18/2022 9.3  8.7 - 10.5 mg/dL Final    Total Protein 02/18/2022 7.5  6.0 - 8.4 g/dL Final    Albumin 02/18/2022 3.6  3.5 - 5.2 g/dL Final    Total Bilirubin 02/18/2022 0.5  0.1 - 1.0 mg/dL Final    Comment: For infants and newborns, interpretation of results should be based  on gestational age, weight and in agreement with clinical  observations.    Premature Infant recommended reference ranges:  Up to 24 hours.............<8.0 mg/dL  Up to 48 hours............<12.0 mg/dL  3-5 days..................<15.0 mg/dL  6-29 days.................<15.0 mg/dL      Alkaline Phosphatase 02/18/2022 94  55 - 135 U/L Final    AST 02/18/2022 17  10 - 40 U/L Final    ALT 02/18/2022 15  10 - 44 U/L Final    Anion Gap 02/18/2022 11  8 - 16 mmol/L Final    eGFR if African American 02/18/2022 >60.0  >60 mL/min/1.73 m^2 Final    eGFR if non  02/18/2022 56.3 (A) >60 mL/min/1.73 m^2 Final    Comment: Calculation used to obtain the estimated glomerular filtration  rate (eGFR) is the CKD-EPI equation.       Cholesterol 02/18/2022 201 (A) 120 - 199 mg/dL Final    Comment: The National Cholesterol Education Program (NCEP) has set the  following guidelines (reference ranges) for Cholesterol:  Optimal.....................<200 mg/dL  Borderline High.............200-239 mg/dL  High........................> or = 240 mg/dL      Triglycerides 02/18/2022 267 (A) 30 - 150 mg/dL Final    Comment: The National Cholesterol Education Program (NCEP) has set the  following guidelines (reference values) for triglycerides:  Normal......................<150 mg/dL  Borderline High.............150-199 mg/dL  High........................200-499 mg/dL      HDL 02/18/2022 45  40 - 75 mg/dL Final    Comment: The National Cholesterol Education Program (NCEP) has set the  following guidelines (reference values) for HDL  Cholesterol:  Low...............<40 mg/dL  Optimal...........>60 mg/dL      LDL Cholesterol 02/18/2022 102.6  63.0 - 159.0 mg/dL Final    Comment: The National Cholesterol Education Program (NCEP) has set the  following guidelines (reference values) for LDL Cholesterol:  Optimal.......................<130 mg/dL  Borderline High...............130-159 mg/dL  High..........................160-189 mg/dL  Very High.....................>190 mg/dL      HDL/Cholesterol Ratio 02/18/2022 22.4  20.0 - 50.0 % Final    Total Cholesterol/HDL Ratio 02/18/2022 4.5  2.0 - 5.0 Final    Non-HDL Cholesterol 02/18/2022 156  mg/dL Final    Comment: Risk category and Non-HDL cholesterol goals:  Coronary heart disease (CHD)or equivalent (10-year risk of CHD >20%):  Non-HDL cholesterol goal     <130 mg/dL  Two or more CHD risk factors and 10-year risk of CHD <= 20%:  Non-HDL cholesterol goal     <160 mg/dL  0 to 1 CHD risk factor:  Non-HDL cholesterol goal     <190 mg/dL      Hemoglobin A1C 02/18/2022 7.1 (A) 4.0 - 5.6 % Final    Comment: ADA Screening Guidelines:  5.7-6.4%  Consistent with prediabetes  >or=6.5%  Consistent with diabetes    High levels of fetal hemoglobin interfere with the HbA1C  assay. Heterozygous hemoglobin variants (HbS, HgC, etc)do  not significantly interfere with this assay.   However, presence of multiple variants may affect accuracy.      Estimated Avg Glucose 02/18/2022 157 (A) 68 - 131 mg/dL Final       Assessment & Plan:      Hodan was seen today for follow-up and elbow pain.  The patient has been encouraged to continue exercise activities and to lose weight.  Diabetes teaching will be ordered.  Metformin therapy will be initiated.    Sports Medicine consultation will be obtained regarding elbow tendinitis.    Fasting blood tests will be obtained in 4 months.    Diagnoses and all orders for this visit:    Type 2 diabetes mellitus without complication, without long-term current use of insulin  -      Ambulatory referral/consult to Diabetes Education; Future  -     Comprehensive Metabolic Panel; Future  -     Lipid Panel; Future  -     Hemoglobin A1C; Future    Essential hypertension  -     Comprehensive Metabolic Panel; Future  -     Lipid Panel; Future  -     Hemoglobin A1C; Future    Gastroesophageal reflux disease, unspecified whether esophagitis present    Tendinitis of both elbows  -     Ambulatory referral/consult to Sports Medicine; Future    Class 2 severe obesity with serious comorbidity and body mass index (BMI) of 39.0 to 39.9 in adult, unspecified obesity type    Other orders  -     metFORMIN (GLUCOPHAGE) 500 MG tablet; Take 1 tablet (500 mg total) by mouth before dinner.         Follow up in about 4 months (around 7/14/2022).     Nilson Bahena MD

## 2022-03-16 ENCOUNTER — OFFICE VISIT (OUTPATIENT)
Dept: SPORTS MEDICINE | Facility: CLINIC | Age: 57
End: 2022-03-16
Payer: COMMERCIAL

## 2022-03-16 VITALS
SYSTOLIC BLOOD PRESSURE: 112 MMHG | BODY MASS INDEX: 40.71 KG/M2 | WEIGHT: 244.31 LBS | HEART RATE: 70 BPM | RESPIRATION RATE: 18 BRPM | DIASTOLIC BLOOD PRESSURE: 67 MMHG | HEIGHT: 65 IN

## 2022-03-16 DIAGNOSIS — M77.8 TENDINITIS OF BOTH ELBOWS: ICD-10-CM

## 2022-03-16 DIAGNOSIS — M67.80 TENDINOSIS: ICD-10-CM

## 2022-03-16 DIAGNOSIS — M77.12 LATERAL EPICONDYLITIS OF BOTH ELBOWS: Primary | ICD-10-CM

## 2022-03-16 DIAGNOSIS — M77.11 LATERAL EPICONDYLITIS OF BOTH ELBOWS: Primary | ICD-10-CM

## 2022-03-16 PROCEDURE — 3051F PR MOST RECENT HEMOGLOBIN A1C LEVEL 7.0 - < 8.0%: ICD-10-PCS | Mod: CPTII,S$GLB,, | Performed by: FAMILY MEDICINE

## 2022-03-16 PROCEDURE — 99204 OFFICE O/P NEW MOD 45 MIN: CPT | Mod: 25,S$GLB,, | Performed by: FAMILY MEDICINE

## 2022-03-16 PROCEDURE — 1159F MED LIST DOCD IN RCRD: CPT | Mod: CPTII,S$GLB,, | Performed by: FAMILY MEDICINE

## 2022-03-16 PROCEDURE — 3051F HG A1C>EQUAL 7.0%<8.0%: CPT | Mod: CPTII,S$GLB,, | Performed by: FAMILY MEDICINE

## 2022-03-16 PROCEDURE — 3074F SYST BP LT 130 MM HG: CPT | Mod: CPTII,S$GLB,, | Performed by: FAMILY MEDICINE

## 2022-03-16 PROCEDURE — 3008F PR BODY MASS INDEX (BMI) DOCUMENTED: ICD-10-PCS | Mod: CPTII,S$GLB,, | Performed by: FAMILY MEDICINE

## 2022-03-16 PROCEDURE — 76942 ECHO GUIDE FOR BIOPSY: CPT | Mod: 26,S$GLB,, | Performed by: FAMILY MEDICINE

## 2022-03-16 PROCEDURE — 20551 NJX 1 TENDON ORIGIN/INSJ: CPT | Mod: S$GLB,,, | Performed by: FAMILY MEDICINE

## 2022-03-16 PROCEDURE — 99999 PR PBB SHADOW E&M-EST. PATIENT-LVL V: ICD-10-PCS | Mod: PBBFAC,,, | Performed by: FAMILY MEDICINE

## 2022-03-16 PROCEDURE — 99204 PR OFFICE/OUTPT VISIT, NEW, LEVL IV, 45-59 MIN: ICD-10-PCS | Mod: 25,S$GLB,, | Performed by: FAMILY MEDICINE

## 2022-03-16 PROCEDURE — 1159F PR MEDICATION LIST DOCUMENTED IN MEDICAL RECORD: ICD-10-PCS | Mod: CPTII,S$GLB,, | Performed by: FAMILY MEDICINE

## 2022-03-16 PROCEDURE — 1160F RVW MEDS BY RX/DR IN RCRD: CPT | Mod: CPTII,S$GLB,, | Performed by: FAMILY MEDICINE

## 2022-03-16 PROCEDURE — 3078F PR MOST RECENT DIASTOLIC BLOOD PRESSURE < 80 MM HG: ICD-10-PCS | Mod: CPTII,S$GLB,, | Performed by: FAMILY MEDICINE

## 2022-03-16 PROCEDURE — 1160F PR REVIEW ALL MEDS BY PRESCRIBER/CLIN PHARMACIST DOCUMENTED: ICD-10-PCS | Mod: CPTII,S$GLB,, | Performed by: FAMILY MEDICINE

## 2022-03-16 PROCEDURE — 99999 PR PBB SHADOW E&M-EST. PATIENT-LVL V: CPT | Mod: PBBFAC,,, | Performed by: FAMILY MEDICINE

## 2022-03-16 PROCEDURE — 3078F DIAST BP <80 MM HG: CPT | Mod: CPTII,S$GLB,, | Performed by: FAMILY MEDICINE

## 2022-03-16 PROCEDURE — 3074F PR MOST RECENT SYSTOLIC BLOOD PRESSURE < 130 MM HG: ICD-10-PCS | Mod: CPTII,S$GLB,, | Performed by: FAMILY MEDICINE

## 2022-03-16 PROCEDURE — 3008F BODY MASS INDEX DOCD: CPT | Mod: CPTII,S$GLB,, | Performed by: FAMILY MEDICINE

## 2022-03-16 PROCEDURE — 76942 TENDON ORIGIN: R ELBOW, L ELBOW: ICD-10-PCS | Mod: 26,S$GLB,, | Performed by: FAMILY MEDICINE

## 2022-03-16 PROCEDURE — 20551 TENDON ORIGIN: R ELBOW, L ELBOW: ICD-10-PCS | Mod: S$GLB,,, | Performed by: FAMILY MEDICINE

## 2022-03-16 RX ORDER — TRIAMCINOLONE ACETONIDE 40 MG/ML
40 INJECTION, SUSPENSION INTRA-ARTICULAR; INTRAMUSCULAR
Status: DISCONTINUED | OUTPATIENT
Start: 2022-03-16 | End: 2022-03-16 | Stop reason: HOSPADM

## 2022-03-16 RX ADMIN — TRIAMCINOLONE ACETONIDE 40 MG: 40 INJECTION, SUSPENSION INTRA-ARTICULAR; INTRAMUSCULAR at 11:03

## 2022-03-16 NOTE — PROGRESS NOTES
Hodan Hair, a 56 y.o. female, is here for evaluation of bilat elbow pain.     HISTORY OF PRESENT ILLNESS  Hand dominance: xxx    Location: lateral elbow   Onset: chronic, insidious  Palliative:    relative rest   oral analgesics, OTC     Provocative: gripping, grasping, wrist extension  Prior: none  Progression: plateau discomfort  Quality: sharp  Radiation: none  Severity: per nursing documentation  Timing: intermittent w/ use  Trauma: none    Review of systems (ROS):  A 10+ review of systems was performed with pertinent positives and negatives noted above in the history of present illness. Other systems were negative unless otherwise specified.    PHYSICAL EXAMINATION  General: Patient appears alert and oriented x 3.  Mood is pleasant.  Observation of ears, eyes and nose reveal no gross abnormalities. HEENT: NCAT, sclera nonicteric  Lungs: Respirations are equal and unlabored.  Well nourished, in no acute distress and ambulates with a non-antalgic gait with no assistive devices.    Skin: Skin intact bilaterally. Sensation intact bilaterally. Compartments soft. No evidence of edema, infection, or induration.     ELBOW EXAMINATION    Observation/Inspection  Swelling  none    Deformity  none  Discoloration  none     Scars   none    Atrophy  none    Tenderness / Crepitus (T/C):          T / C        Medial epicondyle   - / -    Med. (Ulnar) collateral ligament  - / -    Flexor pronator Musculature   - / -   Biceps tendon    - / -   Head of radius    - / -    Lateral epicondyle   - / -    Extensor Musculature   - / -   Brachioradialis   - / -   Triceps tendon   - / -   Triceps muscle   - / -   Olecranon    - / -   Olecranon bursa   - / -   Cubital fossa    - / -   Anterior jointline   - / -   Radial tunnel    -/ -             ROM: ('*' = with pain)    Right Elbow  AROM (PROM)     Extension   0 deg  (5 deg)   Flexion   145 deg (145 deg)   Pronation  90 deg  (90 deg)  Supination   80 deg  (80 deg)     Left  Elbow  AROM (PROM)   Extension   0 deg  (5 deg)  Flexion   145 deg (145 deg)   Pronation  90 deg  (90 deg)   Supination   80 deg  (80 deg)      Right Wrist  AROM (PROM)   Extension   80 deg (85 deg)  Flexion   80 deg (85 deg)   Ulnar Deviation   35 deg (40 deg)  Radial Deviation 35 deg (40 deg)     Left Wrist   AROM (PROM)  Extension   80 deg (85 deg)  Flexion   80 deg (85 deg)   Ulnar Deviation   35 deg (40 deg)  Radial Deviation 35 deg (40 deg)     Strength: ('*' = with pain)    Elbow Flexion   5/5  Elbow Extension  5/5  Wrist Flexion   5/5  Wrist Extension  5/5      5/5  Intrinsics   5/5  EPL (Ext. pollicis longus) 5/5  Pinch Mechanism  5/5    Elbow Examination:  See above noted areas of tenderness.   Test for Ligamentous Instability - UCL normal  Test for Ligamentous Instability - LUCL normal  PLRI       -  Tinel's (Percussion) Test - Cubital  -  Tennis Elbow Test    -  Golfer's Elbow Test    -  Radial Capitellar Grind Test   -  Valgus/Extension Overload Test  -  Resisted Long Finger Extension Pain -  Moving Valgus    -  Forearm pain with resisted supination -  Yeargeson' s (elbow pain)   -  Hook test     -    Wrist Examination:  See above noted areas of tenderness.   Finkelstein's Test    -  Tinel's Test - Carpal Tunnel   -  Phalen's Test     -  Median Nerve Compression Test  -  Ulnar-sided Compression Test  -  LT Ballottment Test    -  Snuff box tenderness    -  Russell's Test     -  LT Instability     -  Hook of Hamate Tenderness   -     Extremity Neuro-vascular Testing: Sensation grossly intact to light touch all dermatomal regions. DTR 2+ Biceps, Triceps, BR and Negative Keny's sign. Grossly intact motor function at Elbow, Wrist and Hand. Distal pulses radial and ulnar 2+, brisk cap refill, symmetric.    Other Findings:    ASSESSMENT & PLAN   Assessment:   #1 lateral epicondylitis, bilat   W/ tendinotic changes of common extensor tendon of elbow  Elbow pain right > left    No evidence of osseous  pathology  No evidence of neurologic pathology  No evidence of vascular pathology    Imaging studies reviewed:   X-ray elbow, NOT PERFORMED    Plan:    Discussed overuse nature of injury    We discussed options including    Watchful waiting / relative rest    Physical therapy x   Injection therapy CSI CET b   Consultation    The patient chooses As above   x = prescribed  CSI = corticosteroid injection  VSI = viscosupplement injection  PRPI = platelet rich plasma injection  ia = intra articular  R = right  L = left  B = bilateral   nfSx = surgical consultation was recommended, but patient is not interested in consultation at this time    Physical Therapy        Formal (fPT), @ [facility] facility b   Formal (fPT), @ [facility] facility        Homegoing (hgPT), per concurrent fPT recommendations    Homegoing (hgPT), per prior fPT recommendations    Homegoing (hgPT), handout provided        w/  (atPT)    [blank] = not prescribed  x = prescribed  b = prescribed, and begin as indicated  t = continue as indicated  r = prescribed, and restart as indicated  p = completed prior as indicated  hs = prescribed, and with high school   col = prescribed, and with college or university   nfPT = physical therapy was recommended, but patient is not interested in PT at this time    Activity (e.g. sports, work) restrictions    [blank] = as tolerated  pt = per physical therapist  at = per   NWB = non weight bearing on affected lower extremity, with crutches assistance for ambulation    Bracing Tennis elbow strap, con  Wrist immobilization brace, con   [blank] = not prescribed  r = recommended, but not fit with at todays visit  f = prescribed and fit with at todays visit  t = continue as indicated  d = d/c  p = as needed  rare = use on rare, as-needed basis; advised against chronic use    Pain management    [blank] = No prescription necessary. A handout detailing dosing of  appropriate   over-the-counter musculoskeletal analgesics was made available to the patient.   m = meloxicam x 14 days  mp = 14 day course of meloxicam prescribed prior    Follow up 12   [blank] = as needed  [number] = in [number] weeks  CSI = for corticosteroid injection  VSI = for viscosupplement injection or injection series  PRP = for platelet rich plasma injection or injection series  MRI = after MRI imaging  ns = should surgical options be deferred (no surgery)  o = appointment offered, deferred by patient    Should symptoms worsen or fail to resolve, consider    Revisiting the above options and / or Brace vs.  MRI     Vocation:

## 2022-03-16 NOTE — PROCEDURES
"Tendon Origin: R elbow, L elbow    Date/Time: 3/16/2022 11:45 AM  Performed by: Alfonso Sarmiento MD  Authorized by: Alfonso Sarmiento MD     Consent Done?:  Yes (Verbal)  Timeout: prior to procedure the correct patient, procedure, and site was verified    Indications:  Pain  Site marked: the procedure site was marked    Timeout: prior to procedure the correct patient, procedure, and site was verified    Location:  Elbow  Site:  R elbow and L elbow  Prep: patient was prepped and draped in usual sterile fashion    Ultrasonic Guidance for Needle Placement?: Yes    Needle size:  25 G  Approach:  Anterolateral  Medications:  40 mg triamcinolone acetonide 40 mg/mL; 40 mg triamcinolone acetonide 40 mg/mL  Patient tolerance:  Patient tolerated the procedure well with no immediate complications   Origin of common extensor tendon at the lateral epicondyle  Description of ultrasound utilization for needle guidance:   Ultrasound guidance used for needle localization. Images saved and stored for documentation. The common extensor tendon was visualized at its origin at the lateral epicondyle. Dynamic visualization of the 25g x 1.5" needle was continuous throughout the procedure.      "

## 2022-03-30 ENCOUNTER — PATIENT OUTREACH (OUTPATIENT)
Dept: ADMINISTRATIVE | Facility: OTHER | Age: 57
End: 2022-03-30
Payer: COMMERCIAL

## 2022-03-30 NOTE — PROGRESS NOTES
Care Everywhere: updated  Immunization: updated  Health Maintenance: updated  Media Review: review for outside colon cancer report   Legacy Review:   DIS:  Order placed:   Upcoming appts:  EFAX:  Task Tickets:  Referrals:  [place] ordered 7.9.2021

## 2022-04-04 ENCOUNTER — CLINICAL SUPPORT (OUTPATIENT)
Dept: DIABETES | Facility: CLINIC | Age: 57
End: 2022-04-04
Payer: COMMERCIAL

## 2022-04-04 DIAGNOSIS — E11.9 TYPE 2 DIABETES MELLITUS WITHOUT COMPLICATION, WITHOUT LONG-TERM CURRENT USE OF INSULIN: ICD-10-CM

## 2022-04-04 PROCEDURE — G0108 PR DIAB MANAGE TRN  PER INDIV: ICD-10-PCS | Mod: S$GLB,,, | Performed by: DIETITIAN, REGISTERED

## 2022-04-04 PROCEDURE — G0108 DIAB MANAGE TRN  PER INDIV: HCPCS | Mod: S$GLB,,, | Performed by: DIETITIAN, REGISTERED

## 2022-04-04 PROCEDURE — 99999 PR PBB SHADOW E&M-EST. PATIENT-LVL I: ICD-10-PCS | Mod: PBBFAC,,, | Performed by: DIETITIAN, REGISTERED

## 2022-04-04 PROCEDURE — 99999 PR PBB SHADOW E&M-EST. PATIENT-LVL I: CPT | Mod: PBBFAC,,, | Performed by: DIETITIAN, REGISTERED

## 2022-04-04 NOTE — PROGRESS NOTES
Diabetes Care Specialist Progress Note  Author: Bethany Rosario RD, CDE  Date: 4/4/2022    Program Intake  Reason for Diabetes Program Visit:: Initial Diabetes Assessment  Current diabetes risk level:: moderate  In the last 12 months, have you:: none    Lab Results   Component Value Date    HGBA1C 7.1 (H) 02/18/2022       Clinical    Problem Review  Reviewed Problem List with Patient: yes  Active comorbidities affecting diabetes self-care.: yes  Comorbidities: Hypertension  Reviewed health maintenance: yes    Clinical Assessment  Current Diabetes Treatment: Oral Medication  Have you ever experienced hypoglycemia (low blood sugar)?: no  Have you ever experienced hyperglycemia (high blood sugar)?: no    Medication Information  How do you obtain your medications?: Patient drives  How many days a week do you miss your medications?: Never  Do you sometimes have difficulty refilling your medications?: No  Medication adherence impacting ability to self-manage diabetes?: No    Labs  Do you have regular lab work to monitor your medications?: Yes    Nutritional Status  Diet: Regular (2-3 meals daily plus snacks; drinks water, frozen coffee w/ milk and sugar, milk w/ cereal)  Change in appetite?: No  Dentation:: Intact  Recent Changes in Weight: No Recent Weight Change  Current nutritional status an area of need that is impacting patient's ability to self-manage diabetes?: No    Additional Social History    Support  Does anyone support you with your diabetes care?: yes  Who supports you?: spouse, self  Who takes you to your medical appointments?: self  Does the current support meet the patient's needs?: Yes  Is Support an area impacting ability to self-manage diabetes?: No    Access to Mass Media & Technology  Does the patient have access to any of the following devices or technologies?: Smart phone  Media or technology needs impacting ability to self-manage diabetes?: No    Cognitive/Behavioral Health  Alert and Oriented:  Yes  Difficulty Thinking: No  Requires Prompting: No  Requires assistance for routine expression?: No  Cognitive or behavioral barriers impacting ability to self-manage diabetes?: No    Culture/Roman Catholic  Culture or Moravian beliefs that may impact ability to access healthcare: No    Communication  Language preference: English  Hearing Problems: No  Vision Problems: No  Communication needs impacting ability to self-manage diabetes?: No    Health Literacy  Preferred Learning Method: Face to Face, Hands On, Reading Materials, Web Based  How often do you need to have someone help you read instructions, pamphlets, or written material from your doctor or pharmacy?: Never  Health literacy needs impacting ability to self-manage diabetes?: No      Diabetes Self-Management Skills Assessment    Diabetes Disease Process/Treatment Options  Patient/caregiver able to state what happens when someone has diabetes.: no  Patient/caregiver knows what type of diabetes they have.: no  Patient/caregiver able to identify at least three signs and symptoms of diabetes.: no  Patient able to identify at least three risk factors for diabetes.: no  Diabetes Disease Process/Treatment Options: Skills Assessment Completed: Yes  Assessment indicates:: Instruction Needed  Area of need?: Yes     Reviewed diabetes disease process and treatment options    Nutrition/Healthy Eating  Challenges to healthy eating:: eating out, going to parties  Method of carbohydrate measurement:: no method  Patient can identify foods that impact blood sugar.: no (see comments)  Nutrition/Healthy Eating Skills Assessment Completed:: Yes  Assessment indicates:: Instruction Needed  Area of need?: Yes     Reviewed CHO counting, label reading and addt'l resources to assist w/ CHO counting; plate method reviewed; alternatives to sugary beverages discussed    Physical Activity/Exercise  Patient's daily activity level:: moderately active  Patient formally exercises outside of  work.: yes  Exercise Type: other (see comments) (Pilates 2 days a week - was working with a , but injured elbow and plans to start OT soone)  Frequency: twice a week  Duration: 30 min  Patient can identify forms of physical activity.: yes  Stated forms of physical activity:: any movement performed by muscles that uses energy, housework, yardwork  Patient can identify reasons why exercise/physical activity is important in diabetes management.: no  Physical Activity/Exercise Skills Assessment Completed: : Yes  Assessment indicates:: Instruction Needed  Area of need?: Yes     Reviewed goals and benefits    Medications  Patient is able to describe current diabetes management routine.: yes  Diabetes management routine:: oral medications  Patient is able to identify current diabetes medications, dosages, and appropriate timing of medications.: yes  Patient understands the purpose of the medications taken for diabetes.: no  Patient reports problems or concerns with current medication regimen.: no  Medication Skills Assessment Completed:: Yes  Assessment indicates:: Instruction Needed  Area of need?: Yes     Reviewed MOA of medication and regimen    Home Blood Glucose Monitoring  Patient states that blood sugar is checked at home daily.: no  Reasons for not monitoring:: new diabetes diagnosis  Home Blood Glucose Monitoring Skills Assessment Completed: : Yes  Assessment indicates:: Instruction Needed  Area of need?: Yes     Reviewed SMBG schedule and BG goals; Rx request sent to provider for meter/supplies    Acute Complications  Patient is able to identify types of acute complications: No  Acute Complications Skills Assessment Completed: : Yes  Assessment indicates:: Instruction Needed  Area of need?: Yes     Reviewed s/s and treatment of hyper/hypoglycemia    Chronic Complications  Patient can identify major chronic complications of diabetes.: no  Patient can identify ways to prevent or delay diabetes  complications.: no  Patient is aware that having diabetes increases risk of heart disease?: No  Patient is aware that heart disease is the leading cause of death and disability in people with diabetes?: No  Patient able to state risk factors for heart disease?: No  Patient is taking statin?: No  Chronic Complications Skills Assessment Completed: : Yes  Assessment indicates:: Instruction Needed  Area of need?: Yes     Reviewed standards of care    Psychosocial/Coping  Patient can identify ways of coping with chronic disease.: no (see comments)  Psychosocial/Coping Skills Assessment Completed: : Yes  Assessment indicates:: Instruction Needed  Area of need?: Yes     Reviewed ways of coping with stress    Assessment Summary and Plan    Based on today's diabetes care assessment, the following areas of need were identified:      Social 4/4/2022   Support No   Access to Mass Media/Tech No   Cognitive/Behavioral Health No   Culture/[employer] No   Communication No   Health Literacy No        Clinical 4/4/2022   Medication Adherence No   Nutritional Status No        Diabetes Self-Management Skills 4/4/2022   Diabetes Disease Process/Treatment Options Yes   Nutrition/Healthy Eating Yes   Physical Activity/Exercise Yes   Medication Yes   Home Blood Glucose Monitoring Yes   Acute Complications Yes   Chronic Complications Yes   Psychosocial/Coping Yes          Today's interventions were provided through individual discussion, instruction, and written materials were provided.      Patient verbalized understanding of instruction and written materials.  Pt was able to return back demonstration of instructions today. Patient understood key points, needs reinforcement and further instruction.     Diabetes Self-Management Care Plan:    Today's Diabetes Self-Management Care Plan was developed with Hodan's input. Hodan has agreed to work toward the following goal(s) to improve his/her overall diabetes control.      Care Plan: Diabetes  Management   Updates made since 3/5/2022 12:00 AM      Problem: Blood Glucose Self-Monitoring       Long-Range Goal: Check BG once a day    Start Date: 4/4/2022   Expected End Date: 10/4/2022   Priority: High   Barriers: No Barriers Identified         Task: Reviewed the importance of self-monitoring blood glucose and keeping logs. Completed 4/4/2022                Follow Up Plan     Follow up in about 6 months (around 10/4/2022).    Today's care plan and follow up schedule was discussed with patient.  Hodan verbalized understanding of the care plan, goals, and agrees to follow up plan.        The patient was encouraged to communicate with his/her health care provider/physician and care team regarding his/her condition(s) and treatment.  I provided the patient with my contact information today and encouraged to contact me via phone or [facility] Patient Portal as needed.     Length of Visit   Total Time: 60 Minutes

## 2022-04-14 ENCOUNTER — CLINICAL SUPPORT (OUTPATIENT)
Dept: REHABILITATION | Facility: HOSPITAL | Age: 57
End: 2022-04-14
Payer: COMMERCIAL

## 2022-04-14 DIAGNOSIS — M77.12 LATERAL EPICONDYLITIS OF BOTH ELBOWS: ICD-10-CM

## 2022-04-14 DIAGNOSIS — M77.11 LATERAL EPICONDYLITIS OF BOTH ELBOWS: ICD-10-CM

## 2022-04-14 PROCEDURE — 97165 OT EVAL LOW COMPLEX 30 MIN: CPT

## 2022-04-14 PROCEDURE — 97110 THERAPEUTIC EXERCISES: CPT

## 2022-04-14 NOTE — PLAN OF CARE
[facility] Occupational Therapy  Initial Evaluation     Name: Hodan Hair  Clinic Number: [id number]    Therapy Diagnosis:   Encounter Diagnosis   Name Primary?    Lateral epicondylitis of both elbows      Physician: Alfonso Sarmiento, *    Physician Orders: eval and treat   Medical Diagnosis: lateral epicondylitis, bilat              W/ tendinotic changes of common extensor tendon of elbow     Surgical Procedure/ Date :none   Evaluation Date: 4/14/2022  Insurance:[facility]   Insurance Authorization period Expiration: [facility]   Plan of Care Certification Period: 12/31/22     Visit # / Visits Authortized: 1 / 20   Time In:1:00 pm   Time Out: 2:00 pm   Total Billable Time: 50  minutes    Precautions: Standard    Subjective     Involved Side:  right  Dominant Side: Right  Date of Onset: several months ago    Mechanism of Injury: was working out and she had her right arm in extension over foam roll and started with right elbow lateral elbow pain.   History of Current Condition: none     Imaging: none   Previous Therapy: none     Patient's Goals for Therapy: decreased pain       Pain:  Functional Pain Scale Rating 0-10:   8 on average  2/10 at best  8/10 at worst  Locationbilateral: elbows and right greater than left    Description: Burning  Easing Factors: massage    Occupation:      Working presently: employed  Duties:  Computer and writes a lot      Functional Limitations/Social History:    Previous functional status includes: Independent with all ADLs.     Current FunctionalStatus   Home/Living environment : lives with their family      Limitation of Functional Status as follows:   ADLs/IADLs:     - Feeding:Minimal-Moderate Modifications/Assistance    - Bathing:Minimal-Moderate Modifications/Assistance    - Dressing/Grooming: Minimal-Moderate Modifications/Assistance    - Driving: Minimal-Moderate Modifications/Assistance           Past Medical History/Physical Systems Review:   Hodan BOSE  Reginaldo  has a past medical history of ADHD (attention deficit hyperactivity disorder), Anxiety disorder, Hyperlipidemia, Hypertension, and Hypothyroid.    Hodan Hair  has a past surgical history that includes  section; Cholecystectomy; [place] tooth extraction; Trigger finger release; Endometrial ablation; Breast biopsy (Right, 2020); and Breast biopsy (Right, 06/10/2020).    Hodan has a current medication list which includes the following prescription(s): amlodipine, blood sugar diagnostic, blood-glucose meter, carvedilol, clonidine, dextroamphetamine-amphetamine, escitalopram oxalate, ezetimibe-simvastatin 10-10 mg, hydrochlorothiazide, lancets, levothyroxine, metformin, pantoprazole, tizanidine, vit c,c-mb-fscds-lutein-zeaxan, and vyvanse.    Review of patient's allergies indicates:   Allergen Reactions    No known drug allergies           Objective     Observation/Appearance:  Skin intact and Skin dry     Edema. Measured in centimeters. None     Hand ROM. Measured in degrees.     2022       Right                     WRIST         flexion 40      Ext  40      RD 15      UD 15      Pro 90      Sup  90                 Elbow         flexion   140        extension   full                                           Sensation  Radial Nerve Distribution 2022    Left Right   [place] Eliana     Normal 1.65-2.83  X    Diminished Light Touch 3.22-3.61     Diminished Protective 3.84-4.31     Loss of Protective 4.56-6.65     Untestable >6.65     2 Point Discrimination     Static     Dynamic        right hand      Right    Left    cozen  pos         mills  Pos  Pos       Shyla's Sign         [place] Sign         Clamp Sign         Scaphoid Thrust Test        Linscheid's Test        Metacarpal Stress Test        Piano Key Test        ECU Synergy Test        Ulnar Compression Test        TFCC Load Test        Ulnocarpal Stress Test        Midcarpal Shift Test        Pisiform Boost Test              Strength (Dyanmometer) and Pinch Strength (Pinch Gauge)  Measured in pounds and psi. Average of three trials.   2022        Left Right        Rung II 28 58       Leal Pinch 12 15       3pt Pinch 7 14       2pt Pinch 9 10           CMS Impairment/Limitation/Restriction for FOTO initial  Survey    Therapist reviewed FOTO scores for Hodan Hair on 2022.   FOTO documents entered into [facility] - see Media section.                    2022   Limitation Score: 42%  Category: Self care            Treatment     Treatment Time In: 1:00 pm   Treatment Time Out: 2:00 pm   Total Treatment time separate from Evaluation time: 30    Hodan received the following direct contact modalities after being cleared for contraindications for 10 minutes:  -STM over bilateral lateral elbows     Hodan received the following manual therapy techniques for 10  minutes:   -wrist stretches     Hodan received therapeutic exercises for 10 minutes including:  -Pt reformed demonstrated ASTYM wrist stretches   instructed to purchase wrist splint for night time wear .      Home Exercise Program/Education:  Issued HEP: ASTYM wrist stretch , and wrist splints at [facility]       and educated on modality use for pain management . Exercises were reviewed and Hodan was able to demonstrate them prior to the end of the session.   Pt received a written copy of exercises to perform at home. Hodan demonstrated good  understanding of the education provided.  Pt was advised to perform these exercises free of pain, and to stop performing them if pain occurs.    Patient/Family Education: role of OT, goals for OT, scheduling/cancellations - pt verbalized understanding. Discussed insurance limitations with patient.    Additional Education provided:  None     Assessment     Hodan Hair is a 56 y.o. female referred to outpatient occupational/hand therapy and presents with a medical diagnosis of  Bilateral , resulting in  motion  and  demonstrates limitations as described in the chart below. Following  medical record review it is determined that pt will benefit from occupational therapy services in order to maximize pain free and/or functional use of bilateral  Elbow .     The patient's rehab potential is Good.     Anticipated barriers to occupational therapy:  None   Pt has no cultural, educational or language barriers to learning provided.    Profile and History Assessment of Occupational Performance Level of Clinical Decision Making Complexity Score   Occupational Profile:   Hodan Hair is a 56 y.o. female who lives with their family and is currently employed . Hodan Hair has difficulty with  Dressing, fine motor to manage clothing   housework/household chores  affecting his/her daily functional abilities. His/her main goal for therapy is decreased pain in elbows .     Comorbidities:    has a past medical history of ADHD (attention deficit hyperactivity disorder), Anxiety disorder, Hyperlipidemia, Hypertension, and Hypothyroid.    Medical and Therapy History Review:   Brief               Performance Deficits    Physical:  Joint Mobility    Cognitive:  No Deficits    Psychosocial:    No Deficits     Clinical Decision Making:  low    Assessment Process:  Problem-Focused Assessments    Modification/Need for Assistance:  Not Necessary    Intervention Selection:  Limited Treatment Options       low  Based on PMHX, co morbidities , data from assessments and functional level of assistance required with task and clinical presentation directly impacting function.       The following goals were discussed with the patient and patient is in agreement with them as to be addressed in the treatment plan.          GOALS:  6  weeks. Pt agrees with goals set.      Short Term (6 weeks on 5/30/22 ):  1)   Patient to be IND with HEP and modalities for pain management, progressing     3)   Increase  strength by  5#  lbs. to grasp right hand ,  progressing       Long Term (by discharge):  1)   Pt will report 0 out of 10 pain with wrist motions ., progressing   2)   Patient to score of 20%  on FOTO to demonstrate improved perception of functionalbilateral hand/ arm  Use. Progressing   3)   Pt will return to prior level of function for ADLs and household management, progressing              Plan   Certification Period/Plan of care expiration: 4/14/2022 to  5/3022 .     pain control , Eccentric strengthening , postural awareness   Outpatient Occupational Therapy 2 times weekly for 4 weeks may include the following interventions: Manual therapy/joint mobilizations, Modalities for pain management, US 3 mhz, Therapeutic exercises/activities. and Strengthening.      Comfort Schwarz, OT

## 2022-04-27 ENCOUNTER — CLINICAL SUPPORT (OUTPATIENT)
Dept: REHABILITATION | Facility: HOSPITAL | Age: 57
End: 2022-04-27
Payer: COMMERCIAL

## 2022-04-27 DIAGNOSIS — R29.898 DECREASED GRIP STRENGTH OF LEFT HAND: ICD-10-CM

## 2022-04-27 DIAGNOSIS — R52 PAIN: ICD-10-CM

## 2022-04-27 PROCEDURE — 97110 THERAPEUTIC EXERCISES: CPT

## 2022-04-27 PROCEDURE — 97140 MANUAL THERAPY 1/> REGIONS: CPT

## 2022-04-27 NOTE — PROGRESS NOTES
[facility]  Occupational Therapy Treatment Note    Date: 4/27/2022  Name: Hodan Hair  Clinic Number: [id number]    Therapy Diagnosis:   Encounter Diagnoses   Name Primary?    Pain     Decreased  strength of left hand      Physician: Alfonso Sarmiento, *    Physician Orders: eval and treat   Medical Diagnosis: lateral epicondylitis, bilat              W/ tendinotic changes of common extensor tendon of elbow      Surgical Procedure/ Date: none   Evaluation Date: 4/14/2022  Insurance: [facility]   Insurance Authorization period Expiration: [facility]   Plan of Care Certification Period: 12/31/22      Visit # / Visits Authortized: 1 / 20   Time In: 02:10 pm   Time Out: 03:02 pm   Total Billable Time: 52 minutes     Precautions: Standard    SUBJECTIVE     Pt reports: Had a massage yesterday both arms and it felt really good.   She was compliant with home exercise program given last session.   Response to previous treatment: fair  Functional change:  None noted to this date     Pain: 7/10 L  2/10 R   Location: bilateral elbow      OBJECTIVE   Objective Measures updated at progress report unless specified.    Observation/Appearance:  Skin intact and Skin dry      Edema. Measured in centimeters. None      Hand ROM. Measured in degrees.       4/14/2022           Right                                  WRIST             flexion 40         Ext  40         RD 15         UD 15         Pro 90         Sup  90                     Elbow             flexion   140           extension   full                                                                      Sensation  Radial Nerve Distribution 4/14/2022 4/14/2022     Left Right   [place] Eliana       Normal 1.65-2.83   X    Diminished Light Touch 3.22-3.61       Diminished Protective 3.84-4.31       Loss of Protective 4.56-6.65       Untestable >6.65       2 Point Discrimination       Static       Dynamic           right hand                                                   Right                            Left    cozen  pos             mills  Pos  Pos          Shyla's Sign                     Strength (Dyanmometer) and Pinch Strength (Pinch Gauge)  Measured in pounds and psi. Average of three trials.    4/14/2022 4/14/2022             Left Right            Rung II 28 58           Leal Pinch 12 15           3pt Pinch 7 14           2pt Pinch 9 10          Treatment     Hodan received the treatments listed below:     Supervised modalities after being cleared for contradictions: Hot Pack - 10 minutes     Manual therapy techniques: Soft tissue Mobilization were applied to the: bilateral  for 20 minutes, including:  - soft tissue mobilization with star tool     Therapeutic exercises to develop flexibility for 15 minutes, including:   - astym stretches hold 30s x 3 reps each bilateral       Patient Education and Home Exercises      Education provided:   - tennis elbow brace on L during activities   - Progress towards goals     Written Home Exercises Provided: Patient instructed to cont prior HEP.  Exercises were reviewed and Hodan was able to demonstrate them prior to the end of the session.  Hodan demonstrated good  understanding of the HEP provided. See EMR under Patient Instructions for exercises provided during therapy sessions.      ASSESSMENT     Pt would continue to benefit from skilled OT. Increased pain during stretches. Advised to bend elbows to reduce discomfort. Will cont to progress as parish      Hodan is progressing well towards her goals and there are no updates to goals at this time. Pt prognosis is Good.     Pt will continue to benefit from skilled outpatient occupational therapy to address the deficits listed in the problem list on initial evaluation, provide pt/family education and to maximize pt's level of independence in the home and community environment.     Pt's spiritual, cultural and educational needs considered and pt agreeable to plan of  care and goals.    Anticipated barriers to occupational therapy: n/a    Goals:    Short Term (6 weeks on 5/30/22 ):  1)   Patient to be IND with HEP and modalities for pain management, progressing      3)   Increase  strength by  5#  lbs. to grasp right hand , progressing         Long Term (by discharge):  1)   Pt will report 0 out of 10 pain with wrist motions ., progressing   2)   Patient to score of 20%  on FOTO to demonstrate improved perception of functionalbilateral hand/ arm  Use. Progressing   3)   Pt will return to prior level of function for ADLs and household management, progressing              PLAN     Continue skilled occupational therapy with individualized plan of care focusing on improving functional independence     Updates/Grading for next session: cont as parish Jha OT

## 2022-04-29 ENCOUNTER — CLINICAL SUPPORT (OUTPATIENT)
Dept: REHABILITATION | Facility: HOSPITAL | Age: 57
End: 2022-04-29
Payer: COMMERCIAL

## 2022-04-29 DIAGNOSIS — R29.898 DECREASED GRIP STRENGTH OF LEFT HAND: ICD-10-CM

## 2022-04-29 DIAGNOSIS — R52 PAIN: Primary | ICD-10-CM

## 2022-04-29 PROCEDURE — 97110 THERAPEUTIC EXERCISES: CPT

## 2022-04-29 PROCEDURE — 97140 MANUAL THERAPY 1/> REGIONS: CPT

## 2022-04-29 NOTE — PROGRESS NOTES
"      [facility]  Occupational Therapy Treatment Note    Date: 4/29/2022  Name: Hodan Hair  Clinic Number: [id number]    Therapy Diagnosis:   Encounter Diagnoses   Name Primary?    Pain Yes    Decreased  strength of left hand      Physician: Alfonso Sarmiento, *    Physician Orders: eval and treat   Medical Diagnosis: lateral epicondylitis, bilat              W/ tendinotic changes of common extensor tendon of elbow      Surgical Procedure/ Date: none   Evaluation Date: 4/14/2022  Insurance: [facility]   Insurance Authorization period Expiration: [facility]   Plan of Care Certification Period: 12/31/22      Visit # / Visits Authortized: 2 / 20   Time In: 02:05 pm   Time Out: 03:00 pm   Total Billable Time: 55 minutes     Precautions: Standard    SUBJECTIVE     Pt reports:  "I am hurting today"   She was compliant with home exercise program given last session.   Response to previous treatment: fair  Functional change:  None noted to this date     Pain: 7/10 L  3/10 R   Location: bilateral elbow      OBJECTIVE   Objective Measures updated at progress report unless specified.    Observation/Appearance:  Skin intact and Skin dry      Edema. Measured in centimeters. None      Hand ROM. Measured in degrees.       4/14/2022           Right                                  WRIST             flexion 40         Ext  40         RD 15         UD 15         Pro 90         Sup  90                     Elbow             flexion   140           extension   full                                                                Sensation  Radial Nerve Distribution 4/14/2022 4/14/2022     Left Right   [place] Eliana       Normal 1.65-2.83   X    Diminished Light Touch 3.22-3.61       Diminished Protective 3.84-4.31       Loss of Protective 4.56-6.65       Untestable >6.65       2 Point Discrimination       Static       Dynamic           right hand                                                  Right         "                    Left    cozen  pos             mills  Pos  Pos          Shyla's Sign                     Strength (Dyanmometer) and Pinch Strength (Pinch Gauge)  Measured in pounds and psi. Average of three trials.    4/14/2022 4/14/2022             Left Right            Rung II 28 58           Leal Pinch 12 15           3pt Pinch 7 14           2pt Pinch 9 10          Treatment     Hodan received the treatments listed below:     Supervised modalities after being cleared for contradictions: Hot Pack - 8 minutes     Manual therapy techniques: Soft tissue Mobilization were applied to the: bilateral  for 20 minutes, including:  - soft tissue mobilization with star tool bilateral posterior forearms     Therapeutic exercises to develop flexibility for 15 minutes, including:   - astym stretches hold 30s x 3 reps each bilateral   - scapula retraction (holding for 3s) + scapula depression (holding for 3s)       Patient Education and Home Exercises      Education provided:   - tennis elbow brace on L during activities   - Progress towards goals     Written Home Exercises Provided: Patient instructed to cont prior HEP.  Exercises were reviewed and Hodan was able to demonstrate them prior to the end of the session.  Hodan demonstrated good  understanding of the HEP provided. See EMR under Patient Instructions for exercises provided during therapy sessions.      ASSESSMENT     Pt would continue to benefit from skilled OT. Increased pain during stretches. Advised to bend elbows to reduce discomfort. Will cont to progress as parish      Hodan is progressing well towards her goals and there are no updates to goals at this time. Pt prognosis is Good.     Pt will continue to benefit from skilled outpatient occupational therapy to address the deficits listed in the problem list on initial evaluation, provide pt/family education and to maximize pt's level of independence in the home and community environment.     Pt's spiritual,  cultural and educational needs considered and pt agreeable to plan of care and goals.    Anticipated barriers to occupational therapy: n/a    Goals:    Short Term (6 weeks on 5/30/22 ):  1)   Patient to be IND with HEP and modalities for pain management, progressing      3)   Increase  strength by  5#  lbs. to grasp right hand , progressing         Long Term (by discharge):  1)   Pt will report 0 out of 10 pain with wrist motions ., progressing   2)   Patient to score of 20%  on FOTO to demonstrate improved perception of functionalbilateral hand/ arm  Use. Progressing   3)   Pt will return to prior level of function for ADLs and household management, progressing              PLAN     Continue skilled occupational therapy with individualized plan of care focusing on improving functional independence     Updates/Grading for next session: cont as parish Jha, OT

## 2022-05-03 LAB
LEFT EYE DM RETINOPATHY: NEGATIVE
RIGHT EYE DM RETINOPATHY: NEGATIVE

## 2022-05-04 ENCOUNTER — CLINICAL SUPPORT (OUTPATIENT)
Dept: REHABILITATION | Facility: HOSPITAL | Age: 57
End: 2022-05-04
Payer: COMMERCIAL

## 2022-05-04 ENCOUNTER — PATIENT MESSAGE (OUTPATIENT)
Dept: ADMINISTRATIVE | Facility: HOSPITAL | Age: 57
End: 2022-05-04
Payer: COMMERCIAL

## 2022-05-04 ENCOUNTER — PATIENT OUTREACH (OUTPATIENT)
Dept: ADMINISTRATIVE | Facility: HOSPITAL | Age: 57
End: 2022-05-04
Payer: COMMERCIAL

## 2022-05-04 ENCOUNTER — PATIENT MESSAGE (OUTPATIENT)
Dept: REHABILITATION | Facility: HOSPITAL | Age: 57
End: 2022-05-04

## 2022-05-04 DIAGNOSIS — R52 PAIN: Primary | ICD-10-CM

## 2022-05-04 DIAGNOSIS — R29.898 DECREASED GRIP STRENGTH OF LEFT HAND: ICD-10-CM

## 2022-05-04 PROCEDURE — 97110 THERAPEUTIC EXERCISES: CPT

## 2022-05-04 PROCEDURE — 97140 MANUAL THERAPY 1/> REGIONS: CPT

## 2022-05-04 NOTE — PATIENT INSTRUCTIONS
Radial Nerve Glides      Begin exercise with arms at sides, bend elbows to a 90*angle with palms facing up   Shoulders are in a relaxed position                        Shrug shoulders up towards ears while straightening elbows and bending wrist back, palms facing the ceiling.   Keep a fluid motion  Repeat __3-5 ____reps  Perform___5-8 ___ times    RADIAL NERVE: Flossing I    Copyright © I. All rights reserved.

## 2022-05-04 NOTE — PROGRESS NOTES
"        [facility]  Occupational Therapy Treatment Note    Date: 5/4/2022  Name: Hodan Hair  Clinic Number: [id number]    Therapy Diagnosis:   Encounter Diagnoses   Name Primary?    Pain Yes    Decreased  strength of left hand      Physician: Alfonso Sarmiento, *    Physician Orders: eval and treat   Medical Diagnosis: lateral epicondylitis, bilat              W/ tendinotic changes of common extensor tendon of elbow      Surgical Procedure/ Date: none   Evaluation Date: 4/14/2022  Insurance: [facility]   Insurance Authorization period Expiration: [facility]   Plan of Care Certification Period: 12/31/22      Visit # / Visits Authortized: 3 / 20   Time In: 02:05 pm   Time Out: 03:00 pm   Total Billable Time: 55 minutes     Precautions: Standard    SUBJECTIVE     Pt reports:  "I have my brace. In my right it feels more of nerve like a pin going through the back of my elbow. It feels different than my L pain which is definitely muscular"  She was compliant with home exercise program given last session.   Response to previous treatment: fair  Functional change:  None noted to this date     Pain:  8-9/10 L  Prior to tx (picking up water bottle)  3/10 (following tx)     2/10 R   Location: bilateral elbow      OBJECTIVE   Objective Measures updated at progress report unless specified.    Observation/Appearance:  Skin intact and Skin dry      Edema. Measured in centimeters. None      Hand ROM. Measured in degrees.       4/14/2022           Right                                  WRIST             flexion 40         Ext  40         RD 15         UD 15         Pro 90         Sup  90                     Elbow             flexion   140           extension   full                                                                Sensation  Radial Nerve Distribution 4/14/2022 4/14/2022     Left Right   [place] Eliana       Normal 1.65-2.83   X    Diminished Light Touch 3.22-3.61       Diminished " Protective 3.84-4.31       Loss of Protective 4.56-6.65       Untestable >6.65       2 Point Discrimination       Static       Dynamic           right hand                                                  Right                            Left    cozen  pos             mills  Pos  Pos          Shyla's Sign                     Strength (Dyanmometer) and Pinch Strength (Pinch Gauge)  Measured in pounds and psi. Average of three trials.    4/14/2022 4/14/2022             Left Right            Rung II 28 58           Leal Pinch 12 15           3pt Pinch 7 14           2pt Pinch 9 10          Treatment     Hodan received the treatments listed below:     Supervised modalities after being cleared for contradictions: Hot Pack - 8 minutes     Manual therapy techniques: Soft tissue Mobilization were applied to the: bilateral  for 10 minutes, including:  - soft tissue mobilization with star tool L posterior forearm    Direct modalities: Patient received ultrasound to L lateral elbow area to increase blood flow, circulation, tissue elasticity, and for pain management for 8 minutes @ 3 Mhz, Intensity 1.1 w/cm2 at 100% duty cycle.     Therapeutic exercises to develop flexibility for 29 minutes, including:   - astym stretches hold 30s x 3 reps L side   - scapula retraction (holding for 3s) + scapula depression (holding for 3s)   - rows with yellow theraband x 10 (2 sets)   - external rotation with scapular retraction standing against wall x 10 (2 sets)    - horizontal ABD x 10 + postural control   - posterior capsule stretch (across body) hold 30s x 3 reps       Patient Education and Home Exercises      Education provided:   - medbridge HEP; scapula and shoulder stabilization exercises + goal post stretch with foam roller   - tennis elbow brace on L during activities   - Progress towards goals     Written Home Exercises Provided: Patient instructed to cont prior HEP.  Exercises were reviewed and Hodan was able to demonstrate  them prior to the end of the session.  Hodan demonstrated good  understanding of the HEP provided. See EMR under Patient Instructions for exercises provided during therapy sessions.      ASSESSMENT     Pt would continue to benefit from skilled OT.  Min cues to slightly flex elbow to eliminate pain with stretches. Good parish to extension and external rotation exercises to adjust postural control. Updated HEP to include postural exercises and shoulder girdle stabilization.   Will cont to progress as parish Pettit is progressing well towards her goals and there are no updates to goals at this time. Pt prognosis is Good.     Pt will continue to benefit from skilled outpatient occupational therapy to address the deficits listed in the problem list on initial evaluation, provide pt/family education and to maximize pt's level of independence in the home and community environment.     Pt's spiritual, cultural and educational needs considered and pt agreeable to plan of care and goals.    Anticipated barriers to occupational therapy: n/a    Goals:    Short Term (6 weeks on 5/30/22 ):  1)   Patient to be IND with HEP and modalities for pain management, progressing      3)   Increase  strength by  5#  lbs. to grasp right hand , progressing         Long Term (by discharge):  1)   Pt will report 0 out of 10 pain with wrist motions ., progressing   2)   Patient to score of 20%  on FOTO to demonstrate improved perception of functionalbilateral hand/ arm  Use. Progressing   3)   Pt will return to prior level of function for ADLs and household management, progressing              PLAN     Continue skilled occupational therapy with individualized plan of care focusing on improving functional independence     Updates/Grading for next session: cont as parish Jha, OT

## 2022-05-06 ENCOUNTER — CLINICAL SUPPORT (OUTPATIENT)
Dept: REHABILITATION | Facility: HOSPITAL | Age: 57
End: 2022-05-06
Payer: COMMERCIAL

## 2022-05-06 DIAGNOSIS — R29.898 DECREASED GRIP STRENGTH OF LEFT HAND: ICD-10-CM

## 2022-05-06 DIAGNOSIS — R52 PAIN: Primary | ICD-10-CM

## 2022-05-06 PROCEDURE — 97140 MANUAL THERAPY 1/> REGIONS: CPT

## 2022-05-06 PROCEDURE — 97110 THERAPEUTIC EXERCISES: CPT

## 2022-05-06 NOTE — PROGRESS NOTES
"          [facility]  Occupational Therapy Treatment Note    Date: 5/6/2022  Name: Hodan Hair  Clinic Number: [id number]    Therapy Diagnosis:   Encounter Diagnoses   Name Primary?    Pain Yes    Decreased  strength of left hand      Physician: Alfonso Sarmiento, *    Physician Orders: eval and treat   Medical Diagnosis: lateral epicondylitis, bilat              W/ tendinotic changes of common extensor tendon of elbow      Surgical Procedure/ Date: none   Evaluation Date: 4/14/2022  Insurance: [facility]   Insurance Authorization period Expiration: [facility]   Plan of Care Certification Period: 12/31/22      Visit # / Visits Authortized: 4 / 20   Time In: 01:05 pm   Time Out: 02:05 pm   Total Billable Time: 60 minutes     Precautions: Standard    SUBJECTIVE     Pt reports: "I am hurting today. I didn't sleep last night"   She was compliant with home exercise program given last session.   Response to previous treatment: fair; cont with moderate pain   Functional change:  None noted to this date     Pain:  6/10 L Prior to tx (picking up water bottle)  3/10 (following tx)      5/10 R   Location: bilateral elbow      OBJECTIVE   Objective Measures updated at progress report unless specified.    Observation/Appearance:  Skin intact and Skin dry      Edema. Measured in centimeters. None      Hand ROM. Measured in degrees.       4/14/2022           Right                                  WRIST             flexion 40         Ext  40         RD 15         UD 15         Pro 90         Sup  90                     Elbow             flexion   140           extension   full                                                                Sensation  Radial Nerve Distribution 4/14/2022 4/14/2022     Left Right   [place] Eliana       Normal 1.65-2.83   X    Diminished Light Touch 3.22-3.61       Diminished Protective 3.84-4.31       Loss of Protective 4.56-6.65       Untestable >6.65       2 Point " Discrimination       Static       Dynamic           right hand                                                  Right                            Left    cozen  pos             mills  Pos  Pos          Shyla's Sign                     Strength (Dyanmometer) and Pinch Strength (Pinch Gauge)  Measured in pounds and psi. Average of three trials.    4/14/2022 4/14/2022             Left Right            Rung II 28 58           Leal Pinch 12 15           3pt Pinch 7 14           2pt Pinch 9 10          Treatment     Hodan received the treatments listed below:     Supervised modalities after being cleared for contradictions: Hot Pack - 8 minutes     Manual therapy techniques: Soft tissue Mobilization were applied to the: bilateral  for 25 minutes, including:  - soft tissue mobilization with star tool bilateral posterior forearm    (NT) Direct modalities: Patient received ultrasound to L lateral elbow area to increase blood flow, circulation, tissue elasticity, and for pain management for 8 minutes @ 3 Mhz, Intensity 1.1 w/cm2 at 100% duty cycle.     Therapeutic exercises to develop flexibility for 27 minutes, including:   - astym stretches hold 30s x 3 reps bilateral sides   - scapula retraction (holding for 3s) + scapula depression (holding for 3s)   - rows with yellow theraband x 10 (2 sets) (@ home)  - external rotation with scapular retraction standing against wall x 10 (2 sets)  (NT)  - horizontal ABD x 10 + postural control   - posterior capsule stretch (across body) hold 30s x 3 reps   - rows x 10 no weight (2 sets) bilateral sides   - shoulder extension x 10 reps (2 sets)   - yellow putty squeezes x 10 reps with R     Patient Education and Home Exercises      Education provided:   - added yellow putty for R   - medbridge HEP; scapula and shoulder stabilization exercises + goal post stretch with foam roller   - tennis elbow brace on L during activities   - Progress towards goals     Written Home Exercises  Provided: Patient instructed to cont prior HEP.  Exercises were reviewed and Hodan was able to demonstrate them prior to the end of the session.  Hodan demonstrated good  understanding of the HEP provided. See EMR under Patient Instructions for exercises provided during therapy sessions.      ASSESSMENT     Pt would continue to benefit from skilled OT.  Despite increase in pain at start of visit she completed exercises with good toleration. Applied KT to bilateral hands for lateral epicondylitis. Pt verbalized sometimes irritation from bandaids. I verbalized to remove tape if irritation arises. Pt had good understanding.    Will cont to progress as parish      Hodan is progressing well towards her goals and there are no updates to goals at this time. Pt prognosis is Good.     Pt will continue to benefit from skilled outpatient occupational therapy to address the deficits listed in the problem list on initial evaluation, provide pt/family education and to maximize pt's level of independence in the home and community environment.     Pt's spiritual, cultural and educational needs considered and pt agreeable to plan of care and goals.    Anticipated barriers to occupational therapy: n/a    Goals:    Short Term (6 weeks on 5/30/22 ):  1)   Patient to be IND with HEP and modalities for pain management, progressing      3)   Increase  strength by  5#  lbs. to grasp right hand , progressing         Long Term (by discharge):  1)   Pt will report 0 out of 10 pain with wrist motions ., progressing   2)   Patient to score of 20%  on FOTO to demonstrate improved perception of functionalbilateral hand/ arm  Use. Progressing   3)   Pt will return to prior level of function for ADLs and household management, progressing              PLAN     Continue skilled occupational therapy with individualized plan of care focusing on improving functional independence     Updates/Grading for next session: cont as parish Jha  OT

## 2022-05-09 DIAGNOSIS — M77.11 LATERAL EPICONDYLITIS OF BOTH ELBOWS: Primary | ICD-10-CM

## 2022-05-09 DIAGNOSIS — M77.12 LATERAL EPICONDYLITIS OF BOTH ELBOWS: Primary | ICD-10-CM

## 2022-05-11 ENCOUNTER — PROCEDURE VISIT (OUTPATIENT)
Dept: PHYSICAL MEDICINE AND REHAB | Facility: CLINIC | Age: 57
End: 2022-05-11
Payer: COMMERCIAL

## 2022-05-11 ENCOUNTER — CLINICAL SUPPORT (OUTPATIENT)
Dept: REHABILITATION | Facility: HOSPITAL | Age: 57
End: 2022-05-11
Payer: COMMERCIAL

## 2022-05-11 DIAGNOSIS — R29.898 DECREASED GRIP STRENGTH OF LEFT HAND: ICD-10-CM

## 2022-05-11 DIAGNOSIS — M77.12 LATERAL EPICONDYLITIS OF BOTH ELBOWS: ICD-10-CM

## 2022-05-11 DIAGNOSIS — M77.11 LATERAL EPICONDYLITIS OF BOTH ELBOWS: ICD-10-CM

## 2022-05-11 DIAGNOSIS — R52 PAIN: Primary | ICD-10-CM

## 2022-05-11 PROCEDURE — 97110 THERAPEUTIC EXERCISES: CPT

## 2022-05-11 PROCEDURE — 95911 NRV CNDJ TEST 9-10 STUDIES: CPT | Mod: S$GLB,,, | Performed by: PHYSICAL MEDICINE & REHABILITATION

## 2022-05-11 PROCEDURE — 95886 MUSC TEST DONE W/N TEST COMP: CPT | Mod: S$GLB,,, | Performed by: PHYSICAL MEDICINE & REHABILITATION

## 2022-05-11 PROCEDURE — 95886 PR EMG COMPLETE, W/ NERVE CONDUCTION STUDIES, 5+ MUSCLES: ICD-10-PCS | Mod: S$GLB,,, | Performed by: PHYSICAL MEDICINE & REHABILITATION

## 2022-05-11 PROCEDURE — 97140 MANUAL THERAPY 1/> REGIONS: CPT

## 2022-05-11 PROCEDURE — 95911 PR NERVE CONDUCTION STUDY; 9-10 STUDIES: ICD-10-PCS | Mod: S$GLB,,, | Performed by: PHYSICAL MEDICINE & REHABILITATION

## 2022-05-11 NOTE — PROCEDURES
Test Date:  2022    Patient: Hodan Hair : 1965 Physician: Gerry Jordan D.O.   ID#:  SEX: Female Ref. Phys: Alfonso Sarmiento, *     HPI: Hodan Hair is a 56 y.o.female who presents for NCS/EMG to evaluate bilateral ulnar neuropathies.      NCV & EMG Findings:   All nerve conduction studies (as indicated in the following tables) were within normal limits.   All examined muscles (as indicated in the following table) showed no evidence of electrical instability.    Impression:  This is a normal electrophysiologic study of the bilateral upper extremities.        ___________________________  Gerry Jordan D.O.        NCS+  Motor Nerve Results      Latency Amplitude F-Lat Segment Distance CV Comment   Site (ms) Norm (mV) Norm (ms)  (cm) (m/s) Norm    Left Median (APB)   Wrist 3.2  < 4.4 8.7  > 4.2  Wrist-Palm - - -    Elbow 7.2 - 8.1 -  Elbow-Wrist 21 53  > 51    Right Median (APB)   Wrist 3.2  < 4.4 9.8  > 4.2  Wrist-Palm - - -    Elbow 7.3 - 10.6 -  Elbow-Wrist 21 51  > 51    Left Ulnar (ADM)   Wrist 2.5  < 3.7 8.2  > 3.0         Bel Elbow 6.3 - 7.5 -  Bel Elbow-Wrist 20 53  > 52    Abv Elbow 8.1 - 5.3 -  Abv Elbow-Bel Elbow 10 56  > 43    Left Ulnar (FDI)   Wrist 3.5 - 6.1 -         Bel Elbow 7.3 - 7.0 -  Bel Elbow-Wrist 20 53  > 52    Abv Elbow 8.8 - 6.4 -  Abv Elbow-Bel Elbow 10 67  > 43    Right Ulnar (ADM)   Wrist 2.2  < 3.7 8.9  > 3.0         Bel Elbow 6.3 - 6.8 -  Bel Elbow-Wrist 22 54  > 52    Abv Elbow 8.1 - 6.9 -  Abv Elbow-Bel Elbow 11 61  > 43    Right Ulnar (FDI)   Wrist 3.4 - 8.1 -         Bel Elbow 7.5 - 9.6 -  Bel Elbow-Wrist 22 54  > 52    Abv Elbow 9.3 - 9.7 -  Abv Elbow-Bel Elbow 11 61  > 43      Sensory Nerve Results      Latency (Peak) Amplitude (P-P) Segment Distance CV Comment   Site (ms) Norm (µV) Norm  (cm) (m/s) Norm    Left Median   Wrist-Dig II 3.5  < 4.0 35  > 8 Wrist-Dig II 16 46  > 39    Right Median   Wrist-Dig II 3.6  < 4.0 44  > 8 Wrist-Dig II  16 44  > 39    Left Ulnar   Wrist-Dig V 2.9  < 4.0 32  > 4 Wrist-Dig V 14 48  > 38    Right Ulnar   Wrist-Dig V 2.9  < 4.0 37  > 4 Wrist-Dig V 14 48  > 38    Left Radial   Forearm-Wrist 1.90  < 2.8 29  > 11 Forearm-Wrist 10 53 -      EMG+     Side Muscle Nerve Root Ins Act Fibs Psw Amp Dur Poly Recrt Int Pat Comment   Left Deltoid Axillary C5-C6 Nml Nml Nml Nml Nml 0 Nml Nml    Left Triceps Radial C6-C8 Nml Nml Nml Nml Nml 0 Nml Nml    Left Pronator Teres Median C6-C7 Nml Nml Nml Nml Nml 0 Nml Nml    Left FCR Median C6-C7 Nml Nml Nml Nml Nml 0 Nml Nml    Left FDI Ulnar C8-T1 Nml Nml Nml Nml Nml 0 Nml Nml    Right Deltoid Axillary C5-C6 Nml Nml Nml Nml Nml 0 Nml Nml    Right Triceps Radial C6-C8 Nml Nml Nml Nml Nml 0 Nml Nml    Right Pronator Teres Median C6-C7 Nml Nml Nml Nml Nml 0 Nml Nml    Right FCR Median C6-C7 Nml Nml Nml Nml Nml 0 Nml Nml    Right FDI Ulnar C8-T1 Nml Nml Nml Nml Nml 0 Nml Nml            Waveforms:    Motor              Sensory

## 2022-05-11 NOTE — PROGRESS NOTES
"            [facility]  Occupational Therapy Treatment Note    Date: 5/11/2022  Name: Hodan Hair  Clinic Number: [id number]    Therapy Diagnosis:   Encounter Diagnoses   Name Primary?    Pain Yes    Decreased  strength of left hand      Physician: Alfonso Sarmiento, *    Physician Orders: eval and treat   Medical Diagnosis: lateral epicondylitis, bilat              W/ tendinotic changes of common extensor tendon of elbow      Surgical Procedure/ Date: none   Evaluation Date: 4/14/2022  Insurance: [facility]   Insurance Authorization period Expiration: [facility]   Plan of Care Certification Period: 12/31/22      Visit # / Visits Authortized: 5 / 20   Time In: 2:15 pm   Time Out: 03:30 pm   Total Billable Time: 75 minutes     Precautions: Standard    SUBJECTIVE     Pt reports: "I just had to the test. No nerves issues in my R"   She was compliant with home exercise program given last session.   Response to previous treatment: fair; improvement with R pain   Functional change:  None noted to this date     Pain:  L 5/10 (prior to tx)  3/10 (following tx)    R  3/10  (prior to tx) 1/10 (following tx)  Location: bilateral elbow      OBJECTIVE   Objective Measures updated at progress report unless specified.    Observation/Appearance:  Skin intact and Skin dry      Edema. Measured in centimeters. None      Hand ROM. Measured in degrees.       4/14/2022           Right                                  WRIST             flexion 40         Ext  40         RD 15         UD 15         Pro 90         Sup  90                     Elbow             flexion   140           extension   full                                                                Sensation  Radial Nerve Distribution 4/14/2022 4/14/2022     Left Right   [place] Eliana       Normal 1.65-2.83   X    Diminished Light Touch 3.22-3.61       Diminished Protective 3.84-4.31       Loss of Protective 4.56-6.65       Untestable >6.65     "   2 Point Discrimination       Static       Dynamic           right hand                                                  Right                            Left    cozen  pos             mills  Pos  Pos          Shyla's Sign                     Strength (Dyanmometer) and Pinch Strength (Pinch Gauge)  Measured in pounds and psi. Average of three trials.    4/14/2022 4/14/2022 5/11/2022           Left Right  Right         Rung II; extended    25      Rung II; flexed 28 58  32.5         Leal Pinch 12 15           3pt Pinch 7 14           2pt Pinch 9 10          Treatment     Hodan received the treatments listed below:     Supervised modalities after being cleared for contradictions: Hot Pack - 15 minutes     Manual therapy techniques: Soft tissue Mobilization were applied to the: bilateral  for 20 minutes, including:  - soft tissue mobilization with star tool bilateral posterior forearm    (NT) Direct modalities: Patient received ultrasound to L lateral elbow area to increase blood flow, circulation, tissue elasticity, and for pain management for 8 minutes @ 3 Mhz, Intensity 1.1 w/cm2 at 100% duty cycle.     Therapeutic exercises to develop flexibility for 40 minutes, including:   - astym stretches hold 30s x 3 reps bilateral sides   - scapula retraction (holding for 5s) + scapula depression (holding for 5s) x 10 reps   - external rotation with scapular retraction standing against wall x 10 (3 sets)    - horizontal ABD x 10 reps + postural control   - radial nerve glide with yellow flexbar x 8 reps R/L   - KT tape applied to bilateral forearms for lateral epi; 50% along extensors + 100% anchor across proximal forearm   - rows with yellow theraband x 10 (2 sets) (@ home)  - posterior capsule stretch (across body) hold 30s x 3 reps (NT)  - rows x 10 no weight (2 sets) bilateral sides (NT)  - shoulder extension x 10 reps (2 sets) (NT)  - yellow putty squeezes x 10 reps with R  (NT)    Patient Education and Home  Exercises      Education provided:   - REMINDED:  yellow putty for both R/L; brace for L during the day; medbridge exercises    - medbridge HEP; scapula and shoulder stabilization exercises + goal post stretch with foam roller   - tennis elbow brace on L during activities   - Progress towards goals     Written Home Exercises Provided: Patient instructed to cont prior HEP.  Exercises were reviewed and Hodan was able to demonstrate them prior to the end of the session.  Hodan demonstrated good  understanding of the HEP provided. See EMR under Patient Instructions for exercises provided during therapy sessions.      ASSESSMENT     Pt would continue to benefit from skilled OT.  Slight decrease in pain with bilateral elbows at start of tx since last visit. Introduced eccentric strengthening (radial nerve glide) with yellow flexbar. Pt expressed good parish with R however experienced increased discomfort with L. Pt educated on importance of wearing brace for L during the day for progression. Applied KT to bilateral forearms.   Will cont to progress as parish      Hodan is progressing well towards her goals and there are no updates to goals at this time. Pt prognosis is Good.     Pt will continue to benefit from skilled outpatient occupational therapy to address the deficits listed in the problem list on initial evaluation, provide pt/family education and to maximize pt's level of independence in the home and community environment.     Pt's spiritual, cultural and educational needs considered and pt agreeable to plan of care and goals.    Anticipated barriers to occupational therapy: n/a    Goals:    Short Term (6 weeks on 5/30/22 ):  1)   Patient to be IND with HEP and modalities for pain management, progressing      3)   Increase  strength by  5#  lbs. to grasp right hand , progressing         Long Term (by discharge):  1)   Pt will report 0 out of 10 pain with wrist motions ., progressing   2)   Patient to score of 20%   on FOTO to demonstrate improved perception of functionalbilateral hand/ arm  Use. Progressing   3)   Pt will return to prior level of function for ADLs and household management, progressing      PLAN     Continue skilled occupational therapy with individualized plan of care focusing on improving functional independence     Updates/Grading for next session: cont as parish Jha, OT

## 2022-05-13 ENCOUNTER — CLINICAL SUPPORT (OUTPATIENT)
Dept: REHABILITATION | Facility: HOSPITAL | Age: 57
End: 2022-05-13
Payer: COMMERCIAL

## 2022-05-13 DIAGNOSIS — R52 PAIN: Primary | ICD-10-CM

## 2022-05-13 DIAGNOSIS — R29.898 DECREASED GRIP STRENGTH OF LEFT HAND: ICD-10-CM

## 2022-05-13 PROCEDURE — 97140 MANUAL THERAPY 1/> REGIONS: CPT

## 2022-05-13 PROCEDURE — 97110 THERAPEUTIC EXERCISES: CPT

## 2022-05-13 NOTE — PROGRESS NOTES
[facility]  Occupational Therapy Treatment Note    Date: 5/13/2022  Name: Hodan Hair  Clinic Number: [id number]    Therapy Diagnosis:   Encounter Diagnoses   Name Primary?    Pain Yes    Decreased  strength of left hand      Physician: Alfonso Sarmiento, *    Physician Orders: eval and treat   Medical Diagnosis: lateral epicondylitis, bilat              W/ tendinotic changes of common extensor tendon of elbow   Surgical Procedure/ Date: none   Evaluation Date: 4/14/2022  Insurance: [facility]   Insurance Authorization period Expiration: [facility]   Plan of Care Certification Period: 12/31/22      Visit # / Visits Authortized: 6 / 20   Time In: 2:07 pm   Time Out: 03:05 pm   Total Billable Time: 58 minutes     Precautions: Standard    SUBJECTIVE     Pt reports: wearing lateral epi brace  She was compliant with home exercise program given last session.   Response to previous treatment: fair; improvement with R pain   Functional change:  None noted to this date     Pain:  L 3/10 (prior to tx)  2/10 (following tx)    R  2/10  (prior to tx) 1/10 (following tx)  Location: bilateral elbow      OBJECTIVE   Objective Measures updated at progress report unless specified.    Observation/Appearance:  Skin intact and Skin dry      Edema. Measured in centimeters. None      Hand ROM. Measured in degrees.       4/14/2022           Right                                  WRIST             flexion 40         Ext  40         RD 15         UD 15         Pro 90         Sup  90                     Elbow             flexion   140           extension   full                                                                Sensation  Radial Nerve Distribution 4/14/2022 4/14/2022     Left Right   [place] Eliana       Normal 1.65-2.83   X    Diminished Light Touch 3.22-3.61       Diminished Protective 3.84-4.31       Loss of Protective 4.56-6.65       Untestable >6.65       2 Point Discrimination        Static       Dynamic           right hand                                                  Right                            Left    cozen  pos             mills  Pos  Pos          Shyla's Sign                     Strength (Dyanmometer) and Pinch Strength (Pinch Gauge)  Measured in pounds and psi. Average of three trials.    4/14/2022 4/14/2022 5/11/2022 5/13/2022 5/13/2022       Left Right  Right Right  Left      Rung II; extended    25  16.6    Rung II; flexed 28 58  32.5  37.6  17.6     Leal Pinch 12 15           3pt Pinch 7 14           2pt Pinch 9 10          Treatment     Hodan received the treatments listed below:     Supervised modalities after being cleared for contradictions: Hot Pack - 10 minutes     Manual therapy techniques: Soft tissue Mobilization were applied to the: bilateral  for 20 minutes, including:  - soft tissue mobilization with star tool bilateral posterior forearm    (NT) Direct modalities: Patient received ultrasound to L lateral elbow area to increase blood flow, circulation, tissue elasticity, and for pain management for 8 minutes @ 3 Mhz, Intensity 1.1 w/cm2 at 100% duty cycle.     Therapeutic exercises to develop flexibility for 40 minutes, including:   - astym stretches hold 30s x 3 reps bilateral sides   - scapula retraction (holding for 5s) + scapula depression (holding for 5s) x 10 reps   - external rotation with scapular retraction standing against wall x 10 (3 sets)    - horizontal ABD x 10 reps + postural control   - radial nerve glide with yellow flexbar x 8 reps R/L   - KT tape applied to bilateral forearms for lateral epi; 50% along extensors + 100% anchor across proximal forearm   - rows with yellow theraband x 10 (2 sets) (@ home)  - posterior capsule stretch (across body) hold 30s x 3 reps (NT)  - rows x 10 no weight (2 sets) bilateral sides   - shoulder extension x 10 reps (2 sets)   - yellow putty squeezes x 10 reps with R  (NT)    Patient Education and Home  Exercises      Education provided:   - REMINDED:  yellow putty for both R/L; brace for L during the day; medbridge exercises    - medbridge HEP; scapula and shoulder stabilization exercises + goal post stretch with foam roller   - tennis elbow brace on L during activities   - Progress towards goals     Written Home Exercises Provided: Patient instructed to cont prior HEP.  Exercises were reviewed and Hodan was able to demonstrate them prior to the end of the session.  Hodan demonstrated good  understanding of the HEP provided. See EMR under Patient Instructions for exercises provided during therapy sessions.      ASSESSMENT     Pt would continue to benefit from skilled OT.  Slight decrease in pain with bilateral elbows at start of tx since last visit. Introduced eccentric strengthening (radial nerve glide) with yellow flexbar. Pt expressed good parish with R however experienced increased discomfort with L. Pt educated on importance of wearing brace for L during the day for progression. Applied KT to bilateral forearms.   Will cont to progress as parish      Hodan is progressing well towards her goals and there are no updates to goals at this time. Pt prognosis is Good.     Pt will continue to benefit from skilled outpatient occupational therapy to address the deficits listed in the problem list on initial evaluation, provide pt/family education and to maximize pt's level of independence in the home and community environment.     Pt's spiritual, cultural and educational needs considered and pt agreeable to plan of care and goals.    Anticipated barriers to occupational therapy: n/a    Goals:    Short Term (6 weeks on 5/30/22 ):  1)   Patient to be IND with HEP and modalities for pain management, progressing      3)   Increase  strength by  5#  lbs. to grasp right hand , progressing         Long Term (by discharge):  1)   Pt will report 0 out of 10 pain with wrist motions ., progressing   2)   Patient to score of 20%   on FOTO to demonstrate improved perception of functionalbilateral hand/ arm  Use. Progressing   3)   Pt will return to prior level of function for ADLs and household management, progressing      PLAN     Continue skilled occupational therapy with individualized plan of care focusing on improving functional independence     Updates/Grading for next session: cont as parish Jha, OT

## 2022-05-16 RX ORDER — CLONIDINE HYDROCHLORIDE 0.1 MG/1
TABLET ORAL
Qty: 30 TABLET | Refills: 5 | Status: SHIPPED | OUTPATIENT
Start: 2022-05-16 | End: 2022-10-18

## 2022-05-16 NOTE — TELEPHONE ENCOUNTER
No new care gaps identified.  [facility] Embedded Care Gaps. Reference number: [id number]. 5/16/2022   9:38:21 AM FEIT

## 2022-05-17 ENCOUNTER — CLINICAL SUPPORT (OUTPATIENT)
Dept: REHABILITATION | Facility: HOSPITAL | Age: 57
End: 2022-05-17
Payer: COMMERCIAL

## 2022-05-17 DIAGNOSIS — R52 PAIN: Primary | ICD-10-CM

## 2022-05-17 DIAGNOSIS — R29.898 DECREASED GRIP STRENGTH OF LEFT HAND: ICD-10-CM

## 2022-05-17 PROCEDURE — 97140 MANUAL THERAPY 1/> REGIONS: CPT

## 2022-05-17 PROCEDURE — 97110 THERAPEUTIC EXERCISES: CPT

## 2022-05-17 NOTE — PROGRESS NOTES
[facility]  Occupational Therapy Treatment Note    Date: 5/17/2022  Name: Hodan Hair  Clinic Number: [id number]    Therapy Diagnosis:   Encounter Diagnoses   Name Primary?    Pain Yes    Decreased  strength of left hand      Physician: Alfonso Sarmiento, *    Physician Orders: eval and treat   Medical Diagnosis: lateral epicondylitis, bilat              W/ tendinotic changes of common extensor tendon of elbow   Surgical Procedure/ Date: none   Evaluation Date: 4/14/2022  Insurance: [facility]   Insurance Authorization period Expiration: [facility]   Plan of Care Certification Period: 12/31/22      Visit # / Visits Authortized: 6 / 20   Time In: 2:07 pm   Time Out: 03:00 pm   Total Billable Time: 53 minutes     Precautions: Standard    SUBJECTIVE     Pt reports: wearing lateral epi brace  She was compliant with home exercise program given last session.   Response to previous treatment: fair; improvement with R pain   Functional change:  None noted to this date     Pain:  L 3/10 (prior to tx)  /10 (following tx)    R  2/10  (prior to tx) /10 (following tx)  Location: bilateral elbow      OBJECTIVE   Objective Measures updated at progress report unless specified.    Observation/Appearance:  Skin intact and Skin dry      Edema. Measured in centimeters. None      Hand ROM. Measured in degrees.       4/14/2022           Right                                  WRIST             flexion 40         Ext  40         RD 15         UD 15         Pro 90         Sup  90                     Elbow             flexion   140           extension   full                                                                Sensation  Radial Nerve Distribution 4/14/2022 4/14/2022     Left Right   [place] Eliana       Normal 1.65-2.83   X    Diminished Light Touch 3.22-3.61       Diminished Protective 3.84-4.31       Loss of Protective 4.56-6.65       Untestable >6.65       2 Point Discrimination        Static       Dynamic           right hand                                                  Right                            Left    cozen  pos             mills  Pos  Pos          Shyla's Sign                     Strength (Dyanmometer) and Pinch Strength (Pinch Gauge)  Measured in pounds and psi. Average of three trials.    4/14/2022 4/14/2022 5/11/2022 5/13/2022 5/13/2022 5/17/2022     Left Right  Right Right  Left   Right   Rung II; extended    25  16.6 37   Rung II; flexed 28 58  32.5  37.6  17.6     Leal Pinch 12 15           3pt Pinch 7 14           2pt Pinch 9 10          Treatment     Hodan received the treatments listed below:     Supervised modalities after being cleared for contradictions: Hot Pack - 10 minutes     Manual therapy techniques: Soft tissue Mobilization were applied to the: bilateral  for 20 minutes, including:  - soft tissue mobilization with star tool bilateral posterior forearm    (NT) Direct modalities: Patient received ultrasound to L lateral elbow area to increase blood flow, circulation, tissue elasticity, and for pain management for 8 minutes @ 3 Mhz, Intensity 1.1 w/cm2 at 100% duty cycle.     Therapeutic exercises to develop flexibility for 40 minutes, including:   - astym stretches hold 30s x 3 reps bilateral sides   - scapula retraction (holding for 5s) + scapula depression (holding for 5s) x 10 reps   - external rotation with scapular retraction standing against wall x 10 (3 sets)    - horizontal ABD x 10 reps + postural control   - radial nerve glide with RED flexbar x 8 reps R/L   - KT tape applied to bilateral forearms for lateral epi; 50% along extensors + 100% anchor across proximal forearm   - rows with yellow theraband x 10 (2 sets) (@ home)  - posterior capsule stretch (across body) hold 30s x 3 reps (NT)  - rows x 10 no weight (2 sets) bilateral sides   - shoulder extension x 10 reps (2 sets)   - yellow putty squeezes x 10 reps with R  (NT)    Patient Education  and Home Exercises      Education provided:   - REMINDED:  yellow putty for both R/L; brace for L during the day; medbridge exercises    - medbridge HEP; scapula and shoulder stabilization exercises + goal post stretch with foam roller   - tennis elbow brace on L during activities   - Progress towards goals     Written Home Exercises Provided: Patient instructed to cont prior HEP.  Exercises were reviewed and Hodan was able to demonstrate them prior to the end of the session.  Hodan demonstrated good  understanding of the HEP provided. See EMR under Patient Instructions for exercises provided during therapy sessions.      ASSESSMENT     Pt would continue to benefit from skilled OT.  Slight decrease in pain with bilateral elbows at start of tx since last visit. Introduced eccentric strengthening (radial nerve glide) with yellow flexbar. Pt expressed good parish with R however experienced increased discomfort with L.  Pt educated on importance of wearing brace for L during the day for progression. Applied KT to bilateral forearms.   Will cont to progress as parish      Hodan is progressing well towards her goals and there are no updates to goals at this time. Pt prognosis is Good.     Pt will continue to benefit from skilled outpatient occupational therapy to address the deficits listed in the problem list on initial evaluation, provide pt/family education and to maximize pt's level of independence in the home and community environment.     Pt's spiritual, cultural and educational needs considered and pt agreeable to plan of care and goals.    Anticipated barriers to occupational therapy: n/a    Goals:    Short Term (6 weeks on 5/30/22 ):  1)   Patient to be IND with HEP and modalities for pain management, progressing      3)   Increase  strength by  5#  lbs. to grasp right hand , progressing         Long Term (by discharge):  1)   Pt will report 0 out of 10 pain with wrist motions ., progressing   2)   Patient to score  of 20%  on FOTO to demonstrate improved perception of functionalbilateral hand/ arm  Use. Progressing   3)   Pt will return to prior level of function for ADLs and household management, progressing      PLAN     Continue skilled occupational therapy with individualized plan of care focusing on improving functional independence     Updates/Grading for next session: cont as parish Jha, OT

## 2022-05-20 ENCOUNTER — CLINICAL SUPPORT (OUTPATIENT)
Dept: REHABILITATION | Facility: HOSPITAL | Age: 57
End: 2022-05-20
Payer: COMMERCIAL

## 2022-05-20 DIAGNOSIS — R52 PAIN: Primary | ICD-10-CM

## 2022-05-20 DIAGNOSIS — R29.898 DECREASED GRIP STRENGTH OF LEFT HAND: ICD-10-CM

## 2022-05-20 PROCEDURE — 97110 THERAPEUTIC EXERCISES: CPT

## 2022-05-20 PROCEDURE — 97140 MANUAL THERAPY 1/> REGIONS: CPT

## 2022-05-20 NOTE — PROGRESS NOTES
[facility]  Occupational Therapy Treatment Note    Date: 5/20/2022  Name: Hodan Hair  Clinic Number: [id number]    Therapy Diagnosis:   Encounter Diagnoses   Name Primary?    Pain Yes    Decreased  strength of left hand      Physician: Alfonso Sarmiento, *    Physician Orders: eval and treat   Medical Diagnosis: lateral epicondylitis, bilat              W/ tendinotic changes of common extensor tendon of elbow   Surgical Procedure/ Date: none   Evaluation Date: 4/14/2022  Insurance: [facility]   Insurance Authorization period Expiration: [facility]   Plan of Care Certification Period: 12/31/22      Visit # / Visits Authortized: 8 / 20   Time In: 2:07 pm   Time Out: 3:05 pm   Total Billable Time: 58 minutes     Precautions: Standard    SUBJECTIVE     Pt reports: she has not felt much relief from wearing her brace  She was compliant with home exercise program given last session.  Response to previous treatment: fair; improvement with R pain   Functional change:  None noted to this date     Pain:  L 6/10 (prior to tx)    R  1/10  (prior to tx)   Location: bilateral elbow      OBJECTIVE   Objective Measures updated at progress report unless specified.    Observation/Appearance:  Skin intact and Skin dry      Edema. Measured in centimeters. None      Hand ROM. Measured in degrees.       4/14/2022     Right                WRIST       flexion 40   Ext  40   RD 15   UD 15   Pro 90   Sup  90               Elbow       flexion   140     extension   full                                  Sensation  Radial Nerve Distribution 4/14/2022 4/14/2022     Left Right   [place] Eliana       Normal 1.65-2.83   X    Diminished Light Touch 3.22-3.61       Diminished Protective 3.84-4.31       Loss of Protective 4.56-6.65       Untestable >6.65       2 Point Discrimination       Static       Dynamic           right hand                                                  Right                            Left     cozen  pos             mills  Pos  Pos          Shyla's Sign                     Strength (Dyanmometer) and Pinch Strength (Pinch Gauge)  Measured in pounds and psi. Average of three trials.    4/14/2022 4/14/2022 5/11/2022 5/13/2022 5/13/2022 5/17/2022     Left Right  Right Right  Left   Right   Rung II; extended    25  16.6 37   Rung II; flexed 28 58  32.5  37.6  17.6     Leal Pinch 12 15           3pt Pinch 7 14           2pt Pinch 9 10          Treatment     Hodan received the treatments listed below:     Supervised modalities after being cleared for contradictions: Hot Pack - 8 minutes     Manual therapy techniques: Soft tissue Mobilization were applied to the: bilateral forearms for 10 minutes each (20 minutes total), including:  - soft tissue mobilization with star tool bilateral posterior forearm    (NT) Direct modalities: Patient received ultrasound to L lateral elbow area to increase blood flow, circulation, tissue elasticity, and for pain management for 8 minutes @ 3 Mhz, Intensity 1.1 w/cm2 at 100% duty cycle.     Therapeutic exercises to develop flexibility for 30 minutes, including:   - astym stretches hold 30s x 3 reps bilateral sides   - scapula retraction (holding for 5s) + scapula depression (holding for 5s) x 10 reps   - external rotation with scapular retraction standing against wall x 10 (3 sets)    - horizontal ABD x 10 reps + postural control (NT)  - radial nerve glides, elbows starting at 90 with palms supinated -> shoulder shrug, elbows extended, wrists flexed (facing ceiling) x10  - KT tape applied to left forears for lateral epi; 50% along extensors + 100% anchor across proximal forearm and wrist, 25% anchor added to proximal strip to prevent rolling  - rows with yellow theraband x 10 (2 sets) (@ home) (NT)  - posterior capsule stretch (across body) hold 30s x 3 reps (NT)  - rows x 10 no weight (2 sets) bilateral sides   - shoulder extension x 10 reps (2 sets)   - yellow putty  squeezes x 10 reps with R  (NT)    Patient Education and Home Exercises      Education provided:   - REMINDED:  yellow putty for both R/L; medbridge exercises    - medbridge HEP; scapula and shoulder stabilization exercises + goal post stretch with foam roller, radial nerve glides  - Progress towards goals     Written Home Exercises Provided: Patient instructed to cont prior HEP.  Exercises were reviewed and Hodan was able to demonstrate them prior to the end of the session.  Hodan demonstrated good  understanding of the HEP provided. See EMR under Patient Instructions for exercises provided during therapy sessions.      ASSESSMENT     Pt would continue to benefit from skilled OT. Pt tolerated treatment well and did not demonstrate any significant changes since last visit. Pt presented with increased pain levels in L UE. Pt has been non-compliant with wearing brace, but stated she is not feeling relief from wearing it. Therapist advised to stop wearing brace for now as she is not feeling relief. Introduced radial nerve glides without flexbar which were tolerated well. Pt expressed localized pain of her R lateral epicondyle and sensitivity to touch. Applied KT to L forearm at end of treatment. Pt stated that she feels the most significant pain relief from the KT tape. Will cont to progress as parish.      Hodan is progressing well towards her goals and there are no updates to goals at this time. Pt prognosis is Good.     Pt will continue to benefit from skilled outpatient occupational therapy to address the deficits listed in the problem list on initial evaluation, provide pt/family education and to maximize pt's level of independence in the home and community environment.     Pt's spiritual, cultural and educational needs considered and pt agreeable to plan of care and goals.    Anticipated barriers to occupational therapy: n/a    Goals:    Short Term (6 weeks on 5/30/22 ):  1)   Patient to be IND with HEP and  modalities for pain management, progressing      3)   Increase  strength by  5#  lbs. to grasp right hand , progressing         Long Term (by discharge):  1)   Pt will report 0 out of 10 pain with wrist motions ., progressing   2)   Patient to score of 20%  on FOTO to demonstrate improved perception of functionalbilateral hand/ arm  Use. Progressing   3)   Pt will return to prior level of function for ADLs and household management, progressing      PLAN     Continue skilled occupational therapy with individualized plan of care focusing on improving functional independence     Updates/Grading for next session: cont as parish French, OTR/L,CHT

## 2022-05-27 RX ORDER — TIZANIDINE HYDROCHLORIDE 4 MG/1
CAPSULE, GELATIN COATED ORAL
Qty: 180 CAPSULE | Refills: 2 | Status: SHIPPED | OUTPATIENT
Start: 2022-05-27 | End: 2022-07-08 | Stop reason: SDUPTHER

## 2022-05-27 NOTE — TELEPHONE ENCOUNTER
No new care gaps identified.  [facility] Embedded Care Gaps. Reference number: [id number]. 5/27/2022   9:00:47 AM CDT

## 2022-06-01 ENCOUNTER — CLINICAL SUPPORT (OUTPATIENT)
Dept: REHABILITATION | Facility: HOSPITAL | Age: 57
End: 2022-06-01
Payer: COMMERCIAL

## 2022-06-01 ENCOUNTER — PATIENT MESSAGE (OUTPATIENT)
Dept: ADMINISTRATIVE | Facility: HOSPITAL | Age: 57
End: 2022-06-01
Payer: COMMERCIAL

## 2022-06-01 DIAGNOSIS — R52 PAIN: Primary | ICD-10-CM

## 2022-06-01 DIAGNOSIS — R29.898 DECREASED GRIP STRENGTH OF LEFT HAND: ICD-10-CM

## 2022-06-01 PROCEDURE — 97110 THERAPEUTIC EXERCISES: CPT

## 2022-06-01 NOTE — PROGRESS NOTES
"    [facility]  Occupational Therapy Treatment Note    Date: 6/1/2022  Name: Hodan Hair  Clinic Number: [id number]    Therapy Diagnosis:   Encounter Diagnoses   Name Primary?    Pain Yes    Decreased  strength of left hand      Physician: Alfonso Sarmiento, *    Physician Orders: eval and treat   Medical Diagnosis: lateral epicondylitis, bilat              W/ tendinotic changes of common extensor tendon of elbow   Surgical Procedure/ Date: none   Evaluation Date: 4/14/2022  Insurance: [facility]   Insurance Authorization period Expiration: [facility]   Plan of Care Certification Period: 12/31/22      Visit # / Visits Authortized: 9 / 20   Time In: 2:06 pm   Time Out: 3:06 pm   Total Billable Time: 60 minutes     Precautions: Standard    SUBJECTIVE     Pt reports: "I'm hurting today", "I've been sleeping horribly"  She was compliant with home exercise program given last session.  Response to previous treatment: fair; improvement with R pain   Functional change:  None noted to this date     Pain:  L 2/10 (prior to tx)  R 1/10  (prior to tx)  Location: bilateral elbow      OBJECTIVE   Objective Measures updated at progress report unless specified.    Observation/Appearance:  Skin intact and Skin dry      Edema. Measured in centimeters. None      Hand ROM. Measured in degrees.       4/14/2022     Right                WRIST       flexion 40   Ext  40   RD 15   UD 15   Pro 90   Sup  90               Elbow       flexion   140     extension   full                                  Sensation  Radial Nerve Distribution 4/14/2022 4/14/2022     Left Right   [place] Eliana       Normal 1.65-2.83   X    Diminished Light Touch 3.22-3.61       Diminished Protective 3.84-4.31       Loss of Protective 4.56-6.65       Untestable >6.65       2 Point Discrimination       Static       Dynamic           right hand                                                  Right                            Left    cozen "  pos             mills  Pos  Pos          Shyla's Sign                     Strength (Dyanmometer) and Pinch Strength (Pinch Gauge)  Measured in pounds and psi. Average of three trials.    4/14/2022 4/14/2022 5/11/2022 5/13/2022 5/13/2022 5/17/2022 6/1/2022 6/1/2022     Left Right  Right Right  Left   Right Left Right   Rung II; extended    25  16.6 37 31 49   Rung II; flexed 28 58  32.5  37.6  17.6   30 50   Leal Pinch 12 15             3pt Pinch 7 14             2pt Pinch 9 10            Treatment     Hodan received the treatments listed below:     Supervised modalities after being cleared for contradictions: Hot Pack - 8 minutes     Manual therapy techniques: Soft tissue Mobilization were applied to the: bilateral forearms for 10 minutes each, including:  - soft tissue mobilization with star tool bilateral posterior forearm (NT)    Direct modalities: Patient received ultrasound to L lateral elbow area to increase blood flow, circulation, tissue elasticity, and for pain management for 8 minutes @ 3 Mhz, Intensity 1.1 w/cm2 at 100% duty cycle. (NT)     Therapeutic exercises to develop flexibility for 52 minutes, including:   - astym stretches hold 30s x 3 reps bilateral sides   - posterior capsule stretch (across body) hold 30s x 3 reps  - scapula retraction (holding for 5s) + scapula depression (holding for 5s) x 10 reps  - external rotation with scapular retraction standing against wall x 10 (2 sets)    - horizontal ABD x 10 reps + postural control (2 sets)  - shana twists with green therabar x10 each side  - updated  strength measurements taken (see above)    - rows with yellow theraband x 10 (2 sets) (@ home) (NT)  - rows x 10 no weight (2 sets) bilateral sides (NT)  - shoulder extension x 10 reps (2 sets) (NT)  - yellow putty squeezes x 10 reps with R (NT)    - KT tape applied to bilateral forearms for lateral epicondylitis    Patient Education and Home Exercises      Education provided:   -  REMINDED:  yellow putty for both R/L; medbridge exercises    - medbridge HEP; scapula and shoulder stabilization exercises + goal post stretch with foam roller, radial nerve glides  - Progress towards goals     Written Home Exercises Provided: Patient instructed to cont prior HEP.  Exercises were reviewed and Hodan was able to demonstrate them prior to the end of the session.  Hodan demonstrated good  understanding of the HEP provided. See EMR under Patient Instructions for exercises provided during therapy sessions.      ASSESSMENT     Pt would continue to benefit from skilled OT. Pt tolerated treatment well and presented with decreased pain levels in bilateral UEs at the beginning of the session .Pt demonstrated increased  strength as shown above with updated measurements. Pt continues to have localized pain at tendon insertion at the lateral epicondyle which limits her functional independence. Pain has overall decreased at rest but continues to slightly elevate with movement and use of UE. Applied KT to B forearms at end of treatment. Pt encouraged to schedule one more visit with possible discharge next visit. Will cont to progress as parish.      Hodan is progressing well towards her goals and there are no updates to goals at this time. Pt prognosis is Good.     Pt will continue to benefit from skilled outpatient occupational therapy to address the deficits listed in the problem list on initial evaluation, provide pt/family education and to maximize pt's level of independence in the home and community environment.     Pt's spiritual, cultural and educational needs considered and pt agreeable to plan of care and goals.    Anticipated barriers to occupational therapy: n/a    Goals:    Short Term (6 weeks on 5/30/22):  1)   Patient to be IND with HEP and modalities for pain management, progressing   2)   Increase  strength by  5#  lbs. to grasp right hand, MET 6/1/2022     Long Term (by discharge):  1)   Pt  will report 0 out of 10 pain with wrist motions ., progressing   2)   Patient to score of 20%  on FOTO to demonstrate improved perception of functionalbilateral hand/ arm  Use. Progressing   3)   Pt will return to prior level of function for ADLs and household management, progressing      PLAN     Continue skilled occupational therapy with individualized plan of care focusing on improving functional independence     Updates/Grading for next session: cont as parish Jha, OT

## 2022-06-08 NOTE — TELEPHONE ENCOUNTER
No new care gaps identified.  [facility] Embedded Care Gaps. Reference number: [id number]. 6/08/2022   5:45:27 PM CDT

## 2022-06-09 ENCOUNTER — CLINICAL SUPPORT (OUTPATIENT)
Dept: REHABILITATION | Facility: HOSPITAL | Age: 57
End: 2022-06-09
Payer: COMMERCIAL

## 2022-06-09 DIAGNOSIS — R29.898 DECREASED GRIP STRENGTH OF LEFT HAND: ICD-10-CM

## 2022-06-09 DIAGNOSIS — R52 PAIN: Primary | ICD-10-CM

## 2022-06-09 PROCEDURE — 97110 THERAPEUTIC EXERCISES: CPT

## 2022-06-09 PROCEDURE — 97140 MANUAL THERAPY 1/> REGIONS: CPT

## 2022-06-09 RX ORDER — EZETIMIBE AND SIMVASTATIN 10; 10 MG/1; MG/1
TABLET ORAL
Qty: 90 TABLET | Refills: 3 | Status: SHIPPED | OUTPATIENT
Start: 2022-06-09 | End: 2023-05-11

## 2022-06-09 NOTE — PROGRESS NOTES
"    [facility]  Occupational Therapy Discharge Note    Date: 6/9/2022  Name: Hodan Hair  Clinic Number: [id number]    Therapy Diagnosis:   Encounter Diagnoses   Name Primary?    Pain Yes    Decreased  strength of left hand      Physician: Alfonso Sarmiento, *    Physician Orders: eval and treat   Medical Diagnosis: lateral epicondylitis, bilat              W/ tendinotic changes of common extensor tendon of elbow   Surgical Procedure/ Date: none   Evaluation Date: 4/14/2022  Insurance: [facility]   Insurance Authorization period Expiration: [facility]   Plan of Care Certification Period: 12/31/22      Visit # / Visits Authortized: 10 / 20   Time In: 2:10 pm   Time Out: 3:10 pm   Total Billable Time: 43 minutes     Precautions: Standard    SUBJECTIVE     Pt reports: "I'm going to try to clean out my closet soon", "I think I'm doing pretty good considering" (carrying an ice chest and handbag on each arm)  She was compliant with home exercise program given last session.  Response to previous treatment: fair; improvement with strength/stability without pain  Functional change: able to carry bags on both arms    Pain:  L 2/10   R 1/10   Location: bilateral elbow      OBJECTIVE   Objective Measures updated at progress report unless specified.    Observation/Appearance:  Skin intact and Skin dry      Edema. Measured in centimeters. None      Hand ROM. Measured in degrees.       4/14/2022 6/9/2022 6/9/2022     Right Left Right                WRIST         flexion 40 55 70   Ext  40 58 55   RD 15 25 25   UD 15 30 40   Pro 90 WFL WFL   Sup  90 WFL WFL               Elbow         flexion   140  WFL WFL    extension   full  WFL WFL                                         Sensation  Radial Nerve Distribution 4/14/2022 4/14/2022     Left Right   [place] Eliana       Normal 1.65-2.83   X    Diminished Light Touch 3.22-3.61       Diminished Protective 3.84-4.31       Loss of Protective 4.56-6.65     "   Untestable >6.65       2 Point Discrimination       Static       Dynamic                 Strength (Dyanmometer) and Pinch Strength (Pinch Gauge)  Measured in pounds and psi. Average of three trials.    4/14/2022 4/14/2022 5/11/2022 5/13/2022 5/13/2022 5/17/2022 6/1/2022 6/1/2022 6/9/2022 6/9/2022     Left Right  Right Right  Left   Right Left Right Left Right   Rung II; extended    25  16.6 37 31 49 40 (+9) 58 (+9)   Rung II; flexed 28 58  32.5  37.6  17.6   30 50 43 (+13) 59 (+9)   Leal Pinch 12 15           12 14   3pt Pinch 7 14           11 12.5   2pt Pinch 9 10               8      10.5     Treatment     Hodan received the treatments listed below:     Supervised modalities after being cleared for contradictions: Hot Pack - 8 minutes     Manual therapy techniques: Soft tissue Mobilization were applied to the: bilateral forearms for 10 minutes each, including: (20 minutes total)  - soft tissue mobilization     Direct modalities: Patient received ultrasound to L lateral elbow area to increase blood flow, circulation, tissue elasticity, and for pain management for 8 minutes @ 3 Mhz, Intensity 1.1 w/cm2 at 100% duty cycle. (NT)       Therapeutic exercises to develop flexibility for 15 minutes, including:   - updated objective measurements - see above  - radial nerve glides x 3 reps (pt verbalized increased tingling in thumb and IF)       NT:  - astym stretches hold 30s x 3 reps bilateral sides   - posterior capsule stretch (across body) hold 30s x 3 reps  - scapula retraction (holding for 5s) + scapula depression (holding for 5s) x 10 reps  - external rotation with scapular retraction standing against wall x 10 (2 sets)    - horizontal ABD x 10 reps + postural control (2 sets)  - shana twists with green therabar x10 each side  - updated  strength measurements taken (see above)  - rows with yellow theraband x 10 (2 sets) (@ home)   - rows x 10 no weight (2 sets) bilateral sides  - shoulder extension x 10  reps (2 sets)   - yellow putty squeezes x 10 reps with R   - KT tape applied to bilateral forearms for lateral epicondylitis    Patient Education and Home Exercises      Education provided:   - REMINDED:  yellow putty for both R/L; medbridge exercises    - medbridge HEP; scapula and shoulder stabilization exercises + goal post stretch with foam roller, radial nerve glides  - Progress towards goals     Written Home Exercises Provided: Patient instructed to cont prior HEP.  Exercises were reviewed and Hodan was able to demonstrate them prior to the end of the session.  Hodan demonstrated good  understanding of the HEP provided. See EMR under Patient Instructions for exercises provided during therapy sessions.      ASSESSMENT     Pt tolerated tx well today. Pt demonstrated increased /pinch strength and wrist ROM as shown above with updated measurements. Pt encouraged to continue self massage and HEP. Pt demonstrates TTP within triceps muscle bellie radiating down to lateral elbow. Pt also verbalized increased tingling in IF and thumb of RUE when performing radial nerve glides. Hodan expressed tingling along posterior medial thumb earlier in treatment however now reports tingling has spread to dorsal aspect of distal phalanx of IF. Pt instructed to schedule appointment with hand surgeon for further evaluation in regards to possible radial nerve involvement.  Pt to discharge from OT at this time.    Hodan is progressing well towards her goals and there are no updates to goals at this time. Pt prognosis is Good.     Pt's spiritual, cultural and educational needs considered and pt agreeable to plan of care and goals.    Anticipated barriers to occupational therapy: n/a    Goals:    Short Term (6 weeks on 5/30/22):  1)   Patient to be IND with HEP and modalities for pain management, MET 6/9/2022  2)   Increase  strength by  5#  lbs. to grasp right hand, MET 6/1/2022     Long Term (by discharge):  1)   Pt will report  0 out of 10 pain with wrist motions. NOT MET (secondary to other underlying issue)  2)   Patient to score of 20%  on FOTO to demonstrate improved perception of functionalbilateral hand/ arm  Use. NOT MET 6/9/2022 (other underlying issue)  3)   Pt will return to prior level of function for ADLs and household management, NOT MET (secondary to other underlying issue)  PLAN     Pt to discharge from OT at this time.    Kiesha Jha, OT

## 2022-06-09 NOTE — TELEPHONE ENCOUNTER
Refill Routing Note   Medication(s) are not appropriate for processing by [facility] for the following reason(s):      - Medication not previously prescribed by PCP    [facility] action(s):  Defer          Medication reconciliation completed: No     Appointments  past 12m or future 3m with PCP    Date Provider   Last Visit   3/14/2022 Nilson Bahena MD   Next Visit   7/15/2022 Nilson Bahena MD   ED visits in past 90 days: 0        Note composed:7:24 PM 06/08/2022

## 2022-06-17 RX ORDER — PANTOPRAZOLE SODIUM 40 MG/1
TABLET, DELAYED RELEASE ORAL
Qty: 90 TABLET | Refills: 3 | OUTPATIENT
Start: 2022-06-17

## 2022-06-17 NOTE — TELEPHONE ENCOUNTER
No new care gaps identified.  [facility] Embedded Care Gaps. Reference number: [id number]. 6/17/2022   9:59:11 AM CDT

## 2022-06-17 NOTE — TELEPHONE ENCOUNTER
Refill Decision Note   Hodan Hair  is requesting a refill authorization.  Brief Assessment and Rationale for Refill:  Quick Discontinue     Medication Therapy Plan:  ORDERED 6/16/22    Medication Reconciliation Completed: No   Comments:     No Care Gaps recommended.     Note composed:12:44 PM 06/17/2022

## 2022-07-08 ENCOUNTER — LAB VISIT (OUTPATIENT)
Dept: LAB | Facility: HOSPITAL | Age: 57
End: 2022-07-08
Attending: INTERNAL MEDICINE
Payer: COMMERCIAL

## 2022-07-08 DIAGNOSIS — E11.9 TYPE 2 DIABETES MELLITUS WITHOUT COMPLICATION, WITHOUT LONG-TERM CURRENT USE OF INSULIN: ICD-10-CM

## 2022-07-08 DIAGNOSIS — I10 ESSENTIAL HYPERTENSION: ICD-10-CM

## 2022-07-08 LAB
ALBUMIN SERPL BCP-MCNC: 3.6 G/DL (ref 3.5–5.2)
ALP SERPL-CCNC: 82 U/L (ref 55–135)
ALT SERPL W/O P-5'-P-CCNC: 27 U/L (ref 10–44)
ANION GAP SERPL CALC-SCNC: 6 MMOL/L (ref 8–16)
AST SERPL-CCNC: 25 U/L (ref 10–40)
BILIRUB SERPL-MCNC: 0.4 MG/DL (ref 0.1–1)
BUN SERPL-MCNC: 18 MG/DL (ref 6–20)
CALCIUM SERPL-MCNC: 9.1 MG/DL (ref 8.7–10.5)
CHLORIDE SERPL-SCNC: 101 MMOL/L (ref 95–110)
CHOLEST SERPL-MCNC: 198 MG/DL (ref 120–199)
CHOLEST/HDLC SERPL: 4.6 {RATIO} (ref 2–5)
CO2 SERPL-SCNC: 27 MMOL/L (ref 23–29)
CREAT SERPL-MCNC: 0.8 MG/DL (ref 0.5–1.4)
EST. GFR  (AFRICAN AMERICAN): >60 ML/MIN/1.73 M^2
EST. GFR  (NON AFRICAN AMERICAN): >60 ML/MIN/1.73 M^2
ESTIMATED AVG GLUCOSE: 171 MG/DL (ref 68–131)
GLUCOSE SERPL-MCNC: 170 MG/DL (ref 70–110)
HBA1C MFR BLD: 7.6 % (ref 4–5.6)
HDLC SERPL-MCNC: 43 MG/DL (ref 40–75)
HDLC SERPL: 21.7 % (ref 20–50)
LDLC SERPL CALC-MCNC: 91.2 MG/DL (ref 63–159)
NONHDLC SERPL-MCNC: 155 MG/DL
POTASSIUM SERPL-SCNC: 3.5 MMOL/L (ref 3.5–5.1)
PROT SERPL-MCNC: 7 G/DL (ref 6–8.4)
SODIUM SERPL-SCNC: 134 MMOL/L (ref 136–145)
TRIGL SERPL-MCNC: 319 MG/DL (ref 30–150)

## 2022-07-08 PROCEDURE — 36415 COLL VENOUS BLD VENIPUNCTURE: CPT | Mod: PO | Performed by: INTERNAL MEDICINE

## 2022-07-08 PROCEDURE — 80061 LIPID PANEL: CPT | Performed by: INTERNAL MEDICINE

## 2022-07-08 PROCEDURE — 80053 COMPREHEN METABOLIC PANEL: CPT | Performed by: INTERNAL MEDICINE

## 2022-07-08 PROCEDURE — 83036 HEMOGLOBIN GLYCOSYLATED A1C: CPT | Performed by: INTERNAL MEDICINE

## 2022-07-11 ENCOUNTER — PATIENT MESSAGE (OUTPATIENT)
Dept: ADMINISTRATIVE | Facility: HOSPITAL | Age: 57
End: 2022-07-11
Payer: COMMERCIAL

## 2022-07-15 ENCOUNTER — OFFICE VISIT (OUTPATIENT)
Dept: INTERNAL MEDICINE | Facility: CLINIC | Age: 57
End: 2022-07-15
Payer: COMMERCIAL

## 2022-07-15 ENCOUNTER — PATIENT MESSAGE (OUTPATIENT)
Dept: INTERNAL MEDICINE | Facility: CLINIC | Age: 57
End: 2022-07-15

## 2022-07-15 VITALS
RESPIRATION RATE: 16 BRPM | DIASTOLIC BLOOD PRESSURE: 70 MMHG | HEART RATE: 68 BPM | TEMPERATURE: 98 F | SYSTOLIC BLOOD PRESSURE: 128 MMHG | BODY MASS INDEX: 41.87 KG/M2 | WEIGHT: 251.31 LBS | HEIGHT: 65 IN

## 2022-07-15 DIAGNOSIS — E11.9 TYPE 2 DIABETES MELLITUS WITHOUT COMPLICATION, WITHOUT LONG-TERM CURRENT USE OF INSULIN: Primary | ICD-10-CM

## 2022-07-15 DIAGNOSIS — I10 ESSENTIAL HYPERTENSION: ICD-10-CM

## 2022-07-15 DIAGNOSIS — F90.2 ATTENTION DEFICIT HYPERACTIVITY DISORDER (ADHD), COMBINED TYPE: ICD-10-CM

## 2022-07-15 DIAGNOSIS — E78.49 OTHER HYPERLIPIDEMIA: ICD-10-CM

## 2022-07-15 DIAGNOSIS — K21.9 GASTROESOPHAGEAL REFLUX DISEASE, UNSPECIFIED WHETHER ESOPHAGITIS PRESENT: ICD-10-CM

## 2022-07-15 DIAGNOSIS — Z12.11 COLON CANCER SCREENING: ICD-10-CM

## 2022-07-15 PROCEDURE — 3008F BODY MASS INDEX DOCD: CPT | Mod: CPTII,S$GLB,, | Performed by: INTERNAL MEDICINE

## 2022-07-15 PROCEDURE — 3074F PR MOST RECENT SYSTOLIC BLOOD PRESSURE < 130 MM HG: ICD-10-PCS | Mod: CPTII,S$GLB,, | Performed by: INTERNAL MEDICINE

## 2022-07-15 PROCEDURE — 3074F SYST BP LT 130 MM HG: CPT | Mod: CPTII,S$GLB,, | Performed by: INTERNAL MEDICINE

## 2022-07-15 PROCEDURE — 3078F PR MOST RECENT DIASTOLIC BLOOD PRESSURE < 80 MM HG: ICD-10-PCS | Mod: CPTII,S$GLB,, | Performed by: INTERNAL MEDICINE

## 2022-07-15 PROCEDURE — 3008F PR BODY MASS INDEX (BMI) DOCUMENTED: ICD-10-PCS | Mod: CPTII,S$GLB,, | Performed by: INTERNAL MEDICINE

## 2022-07-15 PROCEDURE — 1159F MED LIST DOCD IN RCRD: CPT | Mod: CPTII,S$GLB,, | Performed by: INTERNAL MEDICINE

## 2022-07-15 PROCEDURE — 99999 PR PBB SHADOW E&M-EST. PATIENT-LVL IV: CPT | Mod: PBBFAC,,, | Performed by: INTERNAL MEDICINE

## 2022-07-15 PROCEDURE — 1160F PR REVIEW ALL MEDS BY PRESCRIBER/CLIN PHARMACIST DOCUMENTED: ICD-10-PCS | Mod: CPTII,S$GLB,, | Performed by: INTERNAL MEDICINE

## 2022-07-15 PROCEDURE — 1159F PR MEDICATION LIST DOCUMENTED IN MEDICAL RECORD: ICD-10-PCS | Mod: CPTII,S$GLB,, | Performed by: INTERNAL MEDICINE

## 2022-07-15 PROCEDURE — 3051F HG A1C>EQUAL 7.0%<8.0%: CPT | Mod: CPTII,S$GLB,, | Performed by: INTERNAL MEDICINE

## 2022-07-15 PROCEDURE — 1160F RVW MEDS BY RX/DR IN RCRD: CPT | Mod: CPTII,S$GLB,, | Performed by: INTERNAL MEDICINE

## 2022-07-15 PROCEDURE — 99214 PR OFFICE/OUTPT VISIT, EST, LEVL IV, 30-39 MIN: ICD-10-PCS | Mod: S$GLB,,, | Performed by: INTERNAL MEDICINE

## 2022-07-15 PROCEDURE — 3078F DIAST BP <80 MM HG: CPT | Mod: CPTII,S$GLB,, | Performed by: INTERNAL MEDICINE

## 2022-07-15 PROCEDURE — 99214 OFFICE O/P EST MOD 30 MIN: CPT | Mod: S$GLB,,, | Performed by: INTERNAL MEDICINE

## 2022-07-15 PROCEDURE — 3051F PR MOST RECENT HEMOGLOBIN A1C LEVEL 7.0 - < 8.0%: ICD-10-PCS | Mod: CPTII,S$GLB,, | Performed by: INTERNAL MEDICINE

## 2022-07-15 PROCEDURE — 99999 PR PBB SHADOW E&M-EST. PATIENT-LVL IV: ICD-10-PCS | Mod: PBBFAC,,, | Performed by: INTERNAL MEDICINE

## 2022-07-15 RX ORDER — METFORMIN HYDROCHLORIDE 500 MG/1
500 TABLET ORAL 2 TIMES DAILY WITH MEALS
Qty: 60 TABLET | Refills: 6 | Status: SHIPPED | OUTPATIENT
Start: 2022-07-15 | End: 2023-04-20

## 2022-07-15 RX ORDER — TIZANIDINE HYDROCHLORIDE 4 MG/1
2 CAPSULE, GELATIN COATED ORAL NIGHTLY
Qty: 180 CAPSULE | Refills: 2 | Status: CANCELLED | OUTPATIENT
Start: 2022-07-15

## 2022-07-15 RX ORDER — LISDEXAMFETAMINE DIMESYLATE 60 MG/1
60 CAPSULE ORAL EVERY MORNING
COMMUNITY
Start: 2022-07-09 | End: 2022-12-02 | Stop reason: DRUGHIGH

## 2022-08-24 NOTE — PROGRESS NOTES
Subjective:       Patient ID: Hodan Hair is a 57 y.o. female.    Chief Complaint: Diabetes (4 mos w/labs to revu. )    HPI   The patient presents for follow-up of medical conditions which include type 2 diabetes mellitus, hypertension, GERD, ADHD, and hyperlipidemia.  The patient states blood sugar levels have been elevated since having COVID -19 infection in 2/20 the 20.  The patient started the home glucose testing.  She underwent diabetes teaching.    The patient does not monitor blood pressure levels.  Reflux symptoms have been controlled.  Her ADD has been managed well with medication.  He she is tolerating Adderall well without side effects.    The patient is trying to decide whether to the use melatonin or tizanidine at night.    Review of Systems   Constitutional: Negative for activity change, appetite change and unexpected weight change.   Eyes: Negative for visual disturbance.   Respiratory: Negative for shortness of breath.    Cardiovascular: Negative for chest pain, palpitations and leg swelling.   Gastrointestinal: Negative for abdominal pain, blood in stool and diarrhea.   Genitourinary: Negative for dysuria, frequency, hematuria and urgency.   Neurological: Negative for weakness, numbness and headaches.   Psychiatric/Behavioral: Negative for sleep disturbance.            Physical Exam  Vitals and nursing note reviewed.   Constitutional:       General: She is not in acute distress.     Appearance: She is well-developed.   HENT:      Head: Normocephalic and atraumatic.   Eyes:      General: No scleral icterus.     Conjunctiva/sclera: Conjunctivae normal.      Pupils: Pupils are equal, round, and reactive to light.   Neck:      Thyroid: No thyromegaly.      Vascular: No JVD.   Cardiovascular:      Rate and Rhythm: Normal rate and regular rhythm.      Heart sounds: Normal heart sounds. No murmur heard.    No friction rub. No gallop.   Pulmonary:      Effort: Pulmonary effort is normal. No respiratory  distress.      Breath sounds: Normal breath sounds. No wheezing or rales.   Abdominal:      General: Bowel sounds are normal.      Palpations: Abdomen is soft. There is no mass.      Tenderness: There is no abdominal tenderness.   Musculoskeletal:         General: No tenderness. Normal range of motion.      Cervical back: Normal range of motion and neck supple.   Lymphadenopathy:      Cervical: No cervical adenopathy.   Skin:     General: Skin is warm and dry.      Findings: No rash.      Comments: No foot lesions are present.   Neurological:      Mental Status: She is alert and oriented to person, place, and time.      Cranial Nerves: No cranial nerve deficit.      Comments: Sensory exam is intact in both feet on monofilament  testing.   Psychiatric:         Behavior: Behavior normal.         Protective Sensation (w/ 10 gram monofilament):  Right: Intact  Left: Intact    Visual Inspection:  Normal -  Bilateral    Pedal Pulses:   Right: Present  Left: Present    Posterior tibialis:   Right:Present  Left: Present         Lab Visit on 07/08/2022   Component Date Value Ref Range Status    Sodium 07/08/2022 134 (A) 136 - 145 mmol/L Final    Potassium 07/08/2022 3.5  3.5 - 5.1 mmol/L Final    Chloride 07/08/2022 101  95 - 110 mmol/L Final    CO2 07/08/2022 27  23 - 29 mmol/L Final    Glucose 07/08/2022 170 (A) 70 - 110 mg/dL Final    BUN 07/08/2022 18  6 - 20 mg/dL Final    Creatinine 07/08/2022 0.8  0.5 - 1.4 mg/dL Final    Calcium 07/08/2022 9.1  8.7 - 10.5 mg/dL Final    Total Protein 07/08/2022 7.0  6.0 - 8.4 g/dL Final    Albumin 07/08/2022 3.6  3.5 - 5.2 g/dL Final    Total Bilirubin 07/08/2022 0.4  0.1 - 1.0 mg/dL Final    Comment: For infants and newborns, interpretation of results should be based  on gestational age, weight and in agreement with clinical  observations.    Premature Infant recommended reference ranges:  Up to 24 hours.............<8.0 mg/dL  Up to 48 hours............<12.0 mg/dL  3-5  days..................<15.0 mg/dL  6-29 days.................<15.0 mg/dL      Alkaline Phosphatase 07/08/2022 82  55 - 135 U/L Final    AST 07/08/2022 25  10 - 40 U/L Final    ALT 07/08/2022 27  10 - 44 U/L Final    Anion Gap 07/08/2022 6 (A) 8 - 16 mmol/L Final    eGFR if African American 07/08/2022 >60.0  >60 mL/min/1.73 m^2 Final    eGFR if non African American 07/08/2022 >60.0  >60 mL/min/1.73 m^2 Final    Comment: Calculation used to obtain the estimated glomerular filtration  rate (eGFR) is the CKD-EPI equation.       Cholesterol 07/08/2022 198  120 - 199 mg/dL Final    Comment: The National Cholesterol Education Program (NCEP) has set the  following guidelines (reference ranges) for Cholesterol:  Optimal.....................<200 mg/dL  Borderline High.............200-239 mg/dL  High........................> or = 240 mg/dL      Triglycerides 07/08/2022 319 (A) 30 - 150 mg/dL Final    Comment: The National Cholesterol Education Program (NCEP) has set the  following guidelines (reference values) for triglycerides:  Normal......................<150 mg/dL  Borderline High.............150-199 mg/dL  High........................200-499 mg/dL      HDL 07/08/2022 43  40 - 75 mg/dL Final    Comment: The National Cholesterol Education Program (NCEP) has set the  following guidelines (reference values) for HDL Cholesterol:  Low...............<40 mg/dL  Optimal...........>60 mg/dL      LDL Cholesterol 07/08/2022 91.2  63.0 - 159.0 mg/dL Final    Comment: The National Cholesterol Education Program (NCEP) has set the  following guidelines (reference values) for LDL Cholesterol:  Optimal.......................<130 mg/dL  Borderline High...............130-159 mg/dL  High..........................160-189 mg/dL  Very High.....................>190 mg/dL      HDL/Cholesterol Ratio 07/08/2022 21.7  20.0 - 50.0 % Final    Total Cholesterol/HDL Ratio 07/08/2022 4.6  2.0 - 5.0 Final    Non-HDL Cholesterol 07/08/2022 155   mg/dL Final    Comment: Risk category and Non-HDL cholesterol goals:  Coronary heart disease (CHD)or equivalent (10-year risk of CHD >20%):  Non-HDL cholesterol goal     <130 mg/dL  Two or more CHD risk factors and 10-year risk of CHD <= 20%:  Non-HDL cholesterol goal     <160 mg/dL  0 to 1 CHD risk factor:  Non-HDL cholesterol goal     <190 mg/dL      Hemoglobin A1C 07/08/2022 7.6 (A) 4.0 - 5.6 % Final    Comment: ADA Screening Guidelines:  5.7-6.4%  Consistent with prediabetes  >or=6.5%  Consistent with diabetes    High levels of fetal hemoglobin interfere with the HbA1C  assay. Heterozygous hemoglobin variants (HbS, HgC, etc)do  not significantly interfere with this assay.   However, presence of multiple variants may affect accuracy.      Estimated Avg Glucose 07/08/2022 171 (A) 68 - 131 mg/dL Final   Patient Outreach on 05/04/2022   Component Date Value Ref Range Status    Left Eye DM Retinopathy 05/03/2022 Negative   Final    Right Eye DM Retinopathy 05/03/2022 Negative   Final       Assessment & Plan:      Hodan was seen today for diabetes.  Cologuard will be ordered for colon cancer screening.    The dose of metformin will be increased to 500 mg twice a day.  The patient should check her fasting blood sugar levels at home.    Fasting blood tests will be obtained in 4 months.    Patient may use melatonin 2 mg at bedtime.  Tizanidine can be held for now.     Diagnoses and all orders for this visit:    Type 2 diabetes mellitus without complication, without long-term current use of insulin  -     Comprehensive Metabolic Panel; Future  -     Lipid Panel; Future  -     Hemoglobin A1C; Future    Essential hypertension  -     Comprehensive Metabolic Panel; Future  -     Lipid Panel; Future  -     Hemoglobin A1C; Future    Gastroesophageal reflux disease, unspecified whether esophagitis present  -     Comprehensive Metabolic Panel; Future  -     Lipid Panel; Future  -     Hemoglobin A1C; Future    Attention  deficit hyperactivity disorder (ADHD), combined type    Other hyperlipidemia    Colon cancer screening  -     Cologuard Screening (Multitarget Stool DNA); Future  -     Cologuard Screening (Multitarget Stool DNA)    Other orders  -     metFORMIN (GLUCOPHAGE) 500 MG tablet; Take 1 tablet (500 mg total) by mouth 2 (two) times daily with meals.  The following orders have not been finalized:  -     Cancel: tiZANidine 4 mg Cap         Follow up in about 4 months (around 11/15/2022).     Nilson Bahena MD

## 2022-08-31 DIAGNOSIS — E11.9 TYPE 2 DIABETES MELLITUS WITHOUT COMPLICATION: ICD-10-CM

## 2022-09-07 ENCOUNTER — PATIENT MESSAGE (OUTPATIENT)
Dept: ADMINISTRATIVE | Facility: HOSPITAL | Age: 57
End: 2022-09-07
Payer: COMMERCIAL

## 2022-10-26 DIAGNOSIS — I10 HYPERTENSION, UNSPECIFIED TYPE: ICD-10-CM

## 2022-10-26 RX ORDER — CARVEDILOL 25 MG/1
TABLET ORAL
Qty: 180 TABLET | Refills: 2 | Status: SHIPPED | OUTPATIENT
Start: 2022-10-26 | End: 2023-06-22

## 2022-10-26 NOTE — TELEPHONE ENCOUNTER
No new care gaps identified.  [facility] Embedded Care Gaps. Reference number: [id number]. 10/26/2022   8:03:36 AM FEIT

## 2022-10-26 NOTE — TELEPHONE ENCOUNTER
Refill Decision Note   Hodan Hair  is requesting a refill authorization.  Brief Assessment and Rationale for Refill:  Approve     Medication Therapy Plan:       Medication Reconciliation Completed: No   Comments:     No Care Gaps recommended.     Note composed:1:25 PM 10/26/2022

## 2022-11-07 ENCOUNTER — TELEPHONE (OUTPATIENT)
Dept: OBSTETRICS AND GYNECOLOGY | Facility: CLINIC | Age: 57
End: 2022-11-07
Payer: COMMERCIAL

## 2022-11-07 ENCOUNTER — PATIENT MESSAGE (OUTPATIENT)
Dept: OBSTETRICS AND GYNECOLOGY | Facility: CLINIC | Age: 57
End: 2022-11-07
Payer: COMMERCIAL

## 2022-11-07 NOTE — TELEPHONE ENCOUNTER
----- Message from Carrie Fuentes sent at 11/7/2022  2:08 PM CST -----  Regarding: mammo  Contact: [phone]  Type:  Mammogram    Caller is requesting to schedule their annual mammogram appointment.  Order is not listed in EPIC.  Please enter order and contact patient to schedule.  Name of Caller: call   Where would they like the mammogram performed? [facility]   Would the patient rather a call back or a response via [facility]?  Call   Best Call Back Number: [phone]  Additional Information:

## 2022-11-10 ENCOUNTER — OFFICE VISIT (OUTPATIENT)
Dept: OBSTETRICS AND GYNECOLOGY | Facility: CLINIC | Age: 57
End: 2022-11-10
Payer: COMMERCIAL

## 2022-11-10 VITALS
WEIGHT: 244.19 LBS | BODY MASS INDEX: 40.64 KG/M2 | SYSTOLIC BLOOD PRESSURE: 124 MMHG | DIASTOLIC BLOOD PRESSURE: 76 MMHG

## 2022-11-10 DIAGNOSIS — Z01.419 WELL WOMAN EXAM WITH ROUTINE GYNECOLOGICAL EXAM: Primary | ICD-10-CM

## 2022-11-10 DIAGNOSIS — Z12.4 SCREENING FOR CERVICAL CANCER: ICD-10-CM

## 2022-11-10 DIAGNOSIS — Z12.31 ENCOUNTER FOR SCREENING MAMMOGRAM FOR BREAST CANCER: ICD-10-CM

## 2022-11-10 PROCEDURE — 3074F PR MOST RECENT SYSTOLIC BLOOD PRESSURE < 130 MM HG: ICD-10-PCS | Mod: CPTII,S$GLB,, | Performed by: OBSTETRICS & GYNECOLOGY

## 2022-11-10 PROCEDURE — 3051F PR MOST RECENT HEMOGLOBIN A1C LEVEL 7.0 - < 8.0%: ICD-10-PCS | Mod: CPTII,S$GLB,, | Performed by: OBSTETRICS & GYNECOLOGY

## 2022-11-10 PROCEDURE — 99999 PR PBB SHADOW E&M-EST. PATIENT-LVL IV: ICD-10-PCS | Mod: PBBFAC,,, | Performed by: OBSTETRICS & GYNECOLOGY

## 2022-11-10 PROCEDURE — 3051F HG A1C>EQUAL 7.0%<8.0%: CPT | Mod: CPTII,S$GLB,, | Performed by: OBSTETRICS & GYNECOLOGY

## 2022-11-10 PROCEDURE — 3078F DIAST BP <80 MM HG: CPT | Mod: CPTII,S$GLB,, | Performed by: OBSTETRICS & GYNECOLOGY

## 2022-11-10 PROCEDURE — 1159F MED LIST DOCD IN RCRD: CPT | Mod: CPTII,S$GLB,, | Performed by: OBSTETRICS & GYNECOLOGY

## 2022-11-10 PROCEDURE — 3074F SYST BP LT 130 MM HG: CPT | Mod: CPTII,S$GLB,, | Performed by: OBSTETRICS & GYNECOLOGY

## 2022-11-10 PROCEDURE — 1160F PR REVIEW ALL MEDS BY PRESCRIBER/CLIN PHARMACIST DOCUMENTED: ICD-10-PCS | Mod: CPTII,S$GLB,, | Performed by: OBSTETRICS & GYNECOLOGY

## 2022-11-10 PROCEDURE — 99999 PR PBB SHADOW E&M-EST. PATIENT-LVL IV: CPT | Mod: PBBFAC,,, | Performed by: OBSTETRICS & GYNECOLOGY

## 2022-11-10 PROCEDURE — 99396 PR PREVENTIVE VISIT,EST,40-64: ICD-10-PCS | Mod: S$GLB,,, | Performed by: OBSTETRICS & GYNECOLOGY

## 2022-11-10 PROCEDURE — 3008F PR BODY MASS INDEX (BMI) DOCUMENTED: ICD-10-PCS | Mod: CPTII,S$GLB,, | Performed by: OBSTETRICS & GYNECOLOGY

## 2022-11-10 PROCEDURE — 1159F PR MEDICATION LIST DOCUMENTED IN MEDICAL RECORD: ICD-10-PCS | Mod: CPTII,S$GLB,, | Performed by: OBSTETRICS & GYNECOLOGY

## 2022-11-10 PROCEDURE — 1160F RVW MEDS BY RX/DR IN RCRD: CPT | Mod: CPTII,S$GLB,, | Performed by: OBSTETRICS & GYNECOLOGY

## 2022-11-10 PROCEDURE — 88175 CYTOPATH C/V AUTO FLUID REDO: CPT | Performed by: OBSTETRICS & GYNECOLOGY

## 2022-11-10 PROCEDURE — 3078F PR MOST RECENT DIASTOLIC BLOOD PRESSURE < 80 MM HG: ICD-10-PCS | Mod: CPTII,S$GLB,, | Performed by: OBSTETRICS & GYNECOLOGY

## 2022-11-10 PROCEDURE — 87624 HPV HI-RISK TYP POOLED RSLT: CPT | Performed by: OBSTETRICS & GYNECOLOGY

## 2022-11-10 PROCEDURE — 3008F BODY MASS INDEX DOCD: CPT | Mod: CPTII,S$GLB,, | Performed by: OBSTETRICS & GYNECOLOGY

## 2022-11-10 PROCEDURE — 99396 PREV VISIT EST AGE 40-64: CPT | Mod: S$GLB,,, | Performed by: OBSTETRICS & GYNECOLOGY

## 2022-11-10 RX ORDER — TIZANIDINE HYDROCHLORIDE 4 MG/1
1 CAPSULE, GELATIN COATED ORAL NIGHTLY
COMMUNITY
Start: 2022-08-02 | End: 2023-05-09

## 2022-11-14 ENCOUNTER — TELEPHONE (OUTPATIENT)
Dept: ADMINISTRATIVE | Facility: OTHER | Age: 57
End: 2022-11-14
Payer: COMMERCIAL

## 2022-11-16 LAB
FINAL PATHOLOGIC DIAGNOSIS: NORMAL
Lab: NORMAL

## 2022-11-18 ENCOUNTER — LAB VISIT (OUTPATIENT)
Dept: LAB | Facility: HOSPITAL | Age: 57
End: 2022-11-18
Attending: INTERNAL MEDICINE
Payer: COMMERCIAL

## 2022-11-18 DIAGNOSIS — I10 ESSENTIAL HYPERTENSION: ICD-10-CM

## 2022-11-18 DIAGNOSIS — E11.9 TYPE 2 DIABETES MELLITUS WITHOUT COMPLICATION, WITHOUT LONG-TERM CURRENT USE OF INSULIN: ICD-10-CM

## 2022-11-18 DIAGNOSIS — E11.9 TYPE 2 DIABETES MELLITUS WITHOUT COMPLICATION: ICD-10-CM

## 2022-11-18 DIAGNOSIS — K21.9 GASTROESOPHAGEAL REFLUX DISEASE, UNSPECIFIED WHETHER ESOPHAGITIS PRESENT: ICD-10-CM

## 2022-11-18 LAB
ALBUMIN SERPL BCP-MCNC: 3.7 G/DL (ref 3.5–5.2)
ALBUMIN/CREAT UR: 6.2 UG/MG (ref 0–30)
ALP SERPL-CCNC: 91 U/L (ref 55–135)
ALT SERPL W/O P-5'-P-CCNC: 29 U/L (ref 10–44)
ANION GAP SERPL CALC-SCNC: 10 MMOL/L (ref 8–16)
AST SERPL-CCNC: 24 U/L (ref 10–40)
BILIRUB SERPL-MCNC: 0.5 MG/DL (ref 0.1–1)
BUN SERPL-MCNC: 12 MG/DL (ref 6–20)
CALCIUM SERPL-MCNC: 9.1 MG/DL (ref 8.7–10.5)
CHLORIDE SERPL-SCNC: 102 MMOL/L (ref 95–110)
CHOLEST SERPL-MCNC: 178 MG/DL (ref 120–199)
CHOLEST/HDLC SERPL: 3.9 {RATIO} (ref 2–5)
CO2 SERPL-SCNC: 28 MMOL/L (ref 23–29)
CREAT SERPL-MCNC: 1 MG/DL (ref 0.5–1.4)
CREAT UR-MCNC: 209 MG/DL (ref 15–325)
EST. GFR  (NO RACE VARIABLE): >60 ML/MIN/1.73 M^2
ESTIMATED AVG GLUCOSE: 154 MG/DL (ref 68–131)
GLUCOSE SERPL-MCNC: 152 MG/DL (ref 70–110)
HBA1C MFR BLD: 7 % (ref 4–5.6)
HDLC SERPL-MCNC: 46 MG/DL (ref 40–75)
HDLC SERPL: 25.8 % (ref 20–50)
LDLC SERPL CALC-MCNC: 83.4 MG/DL (ref 63–159)
MICROALBUMIN UR DL<=1MG/L-MCNC: 13 UG/ML
NONHDLC SERPL-MCNC: 132 MG/DL
POTASSIUM SERPL-SCNC: 3.6 MMOL/L (ref 3.5–5.1)
PROT SERPL-MCNC: 7.1 G/DL (ref 6–8.4)
SODIUM SERPL-SCNC: 140 MMOL/L (ref 136–145)
TRIGL SERPL-MCNC: 243 MG/DL (ref 30–150)

## 2022-11-18 PROCEDURE — 83036 HEMOGLOBIN GLYCOSYLATED A1C: CPT | Performed by: INTERNAL MEDICINE

## 2022-11-18 PROCEDURE — 36415 COLL VENOUS BLD VENIPUNCTURE: CPT | Mod: PO | Performed by: INTERNAL MEDICINE

## 2022-11-18 PROCEDURE — 80061 LIPID PANEL: CPT | Performed by: INTERNAL MEDICINE

## 2022-11-18 PROCEDURE — 82570 ASSAY OF URINE CREATININE: CPT | Performed by: INTERNAL MEDICINE

## 2022-11-18 PROCEDURE — 82043 UR ALBUMIN QUANTITATIVE: CPT | Performed by: INTERNAL MEDICINE

## 2022-11-18 PROCEDURE — 80053 COMPREHEN METABOLIC PANEL: CPT | Performed by: INTERNAL MEDICINE

## 2022-12-02 ENCOUNTER — OFFICE VISIT (OUTPATIENT)
Dept: INTERNAL MEDICINE | Facility: CLINIC | Age: 57
End: 2022-12-02
Payer: COMMERCIAL

## 2022-12-02 VITALS
WEIGHT: 242.94 LBS | HEART RATE: 73 BPM | BODY MASS INDEX: 40.48 KG/M2 | SYSTOLIC BLOOD PRESSURE: 128 MMHG | OXYGEN SATURATION: 99 % | RESPIRATION RATE: 18 BRPM | TEMPERATURE: 98 F | DIASTOLIC BLOOD PRESSURE: 76 MMHG | HEIGHT: 65 IN

## 2022-12-02 DIAGNOSIS — J31.0 CHRONIC RHINITIS: ICD-10-CM

## 2022-12-02 DIAGNOSIS — R68.2 DRY MOUTH: ICD-10-CM

## 2022-12-02 DIAGNOSIS — K21.9 GASTROESOPHAGEAL REFLUX DISEASE, UNSPECIFIED WHETHER ESOPHAGITIS PRESENT: ICD-10-CM

## 2022-12-02 DIAGNOSIS — E11.9 TYPE 2 DIABETES MELLITUS WITHOUT COMPLICATION, WITHOUT LONG-TERM CURRENT USE OF INSULIN: Primary | ICD-10-CM

## 2022-12-02 DIAGNOSIS — I10 ESSENTIAL HYPERTENSION: ICD-10-CM

## 2022-12-02 DIAGNOSIS — E78.49 OTHER HYPERLIPIDEMIA: ICD-10-CM

## 2022-12-02 PROCEDURE — 3061F NEG MICROALBUMINURIA REV: CPT | Mod: CPTII,S$GLB,, | Performed by: INTERNAL MEDICINE

## 2022-12-02 PROCEDURE — 90471 IMMUNIZATION ADMIN: CPT | Mod: S$GLB,,, | Performed by: INTERNAL MEDICINE

## 2022-12-02 PROCEDURE — 3008F PR BODY MASS INDEX (BMI) DOCUMENTED: ICD-10-PCS | Mod: CPTII,S$GLB,, | Performed by: INTERNAL MEDICINE

## 2022-12-02 PROCEDURE — 3074F PR MOST RECENT SYSTOLIC BLOOD PRESSURE < 130 MM HG: ICD-10-PCS | Mod: CPTII,S$GLB,, | Performed by: INTERNAL MEDICINE

## 2022-12-02 PROCEDURE — 99214 OFFICE O/P EST MOD 30 MIN: CPT | Mod: 25,S$GLB,, | Performed by: INTERNAL MEDICINE

## 2022-12-02 PROCEDURE — 1159F PR MEDICATION LIST DOCUMENTED IN MEDICAL RECORD: ICD-10-PCS | Mod: CPTII,S$GLB,, | Performed by: INTERNAL MEDICINE

## 2022-12-02 PROCEDURE — 90471 PNEUMOCOCCAL CONJUGATE VACCINE 20-VALENT: ICD-10-PCS | Mod: S$GLB,,, | Performed by: INTERNAL MEDICINE

## 2022-12-02 PROCEDURE — 3051F PR MOST RECENT HEMOGLOBIN A1C LEVEL 7.0 - < 8.0%: ICD-10-PCS | Mod: CPTII,S$GLB,, | Performed by: INTERNAL MEDICINE

## 2022-12-02 PROCEDURE — 99999 PR PBB SHADOW E&M-EST. PATIENT-LVL V: CPT | Mod: PBBFAC,,, | Performed by: INTERNAL MEDICINE

## 2022-12-02 PROCEDURE — 3008F BODY MASS INDEX DOCD: CPT | Mod: CPTII,S$GLB,, | Performed by: INTERNAL MEDICINE

## 2022-12-02 PROCEDURE — 3066F NEPHROPATHY DOC TX: CPT | Mod: CPTII,S$GLB,, | Performed by: INTERNAL MEDICINE

## 2022-12-02 PROCEDURE — 99214 PR OFFICE/OUTPT VISIT, EST, LEVL IV, 30-39 MIN: ICD-10-PCS | Mod: 25,S$GLB,, | Performed by: INTERNAL MEDICINE

## 2022-12-02 PROCEDURE — 3051F HG A1C>EQUAL 7.0%<8.0%: CPT | Mod: CPTII,S$GLB,, | Performed by: INTERNAL MEDICINE

## 2022-12-02 PROCEDURE — 90677 PNEUMOCOCCAL CONJUGATE VACCINE 20-VALENT: ICD-10-PCS | Mod: S$GLB,,, | Performed by: INTERNAL MEDICINE

## 2022-12-02 PROCEDURE — 3074F SYST BP LT 130 MM HG: CPT | Mod: CPTII,S$GLB,, | Performed by: INTERNAL MEDICINE

## 2022-12-02 PROCEDURE — 1159F MED LIST DOCD IN RCRD: CPT | Mod: CPTII,S$GLB,, | Performed by: INTERNAL MEDICINE

## 2022-12-02 PROCEDURE — 99999 PR PBB SHADOW E&M-EST. PATIENT-LVL V: ICD-10-PCS | Mod: PBBFAC,,, | Performed by: INTERNAL MEDICINE

## 2022-12-02 PROCEDURE — 3066F PR DOCUMENTATION OF TREATMENT FOR NEPHROPATHY: ICD-10-PCS | Mod: CPTII,S$GLB,, | Performed by: INTERNAL MEDICINE

## 2022-12-02 PROCEDURE — 90677 PCV20 VACCINE IM: CPT | Mod: S$GLB,,, | Performed by: INTERNAL MEDICINE

## 2022-12-02 PROCEDURE — 3078F PR MOST RECENT DIASTOLIC BLOOD PRESSURE < 80 MM HG: ICD-10-PCS | Mod: CPTII,S$GLB,, | Performed by: INTERNAL MEDICINE

## 2022-12-02 PROCEDURE — 3061F PR NEG MICROALBUMINURIA RESULT DOCUMENTED/REVIEW: ICD-10-PCS | Mod: CPTII,S$GLB,, | Performed by: INTERNAL MEDICINE

## 2022-12-02 PROCEDURE — 3078F DIAST BP <80 MM HG: CPT | Mod: CPTII,S$GLB,, | Performed by: INTERNAL MEDICINE

## 2022-12-02 PROCEDURE — 1160F RVW MEDS BY RX/DR IN RCRD: CPT | Mod: CPTII,S$GLB,, | Performed by: INTERNAL MEDICINE

## 2022-12-02 PROCEDURE — 1160F PR REVIEW ALL MEDS BY PRESCRIBER/CLIN PHARMACIST DOCUMENTED: ICD-10-PCS | Mod: CPTII,S$GLB,, | Performed by: INTERNAL MEDICINE

## 2022-12-02 RX ORDER — MUPIROCIN 20 MG/G
OINTMENT TOPICAL 2 TIMES DAILY
COMMUNITY
Start: 2022-11-22 | End: 2023-05-09

## 2022-12-02 RX ORDER — LISDEXAMFETAMINE DIMESYLATE 70 MG/1
70 CAPSULE ORAL EVERY MORNING
COMMUNITY
Start: 2022-11-25

## 2022-12-02 NOTE — PROGRESS NOTES
Subjective:       Patient ID: Hodan Hair is a 57 y.o. female.    Chief Complaint: Follow-up (4 months), Cough, and Nasal Congestion (Brown mucus)      HPI  The patient presents for follow-up of medical conditions which include type 2 diabetes mellitus, hypertension, chronic rhinitis, hyperlipidemia, and GERD.  The patient has been experiencing nasal congestion and postnasal drainage.  She is using Mucinex twice a day.  She denies having any chills or fever.    The patient checks blood sugar levels fairly regularly.  A recent blood sugar level of 147 was obtained.  No hypoglycemia has been noted.  She does not monitor blood pressure readings.    The patient also has periods of anxiety.  She denies feeling depressed.    She does complain of dry mouth.  The symptom has been persistent and I suspect it is mainly due to the clonidine.    Review of Systems   Constitutional:  Negative for activity change, appetite change and unexpected weight change.   Eyes:  Negative for visual disturbance.   Respiratory:  Positive for cough. Negative for shortness of breath.    Cardiovascular:  Negative for chest pain, palpitations and leg swelling.   Gastrointestinal:  Negative for abdominal pain, blood in stool and diarrhea.   Genitourinary:  Negative for dysuria, frequency, hematuria and urgency.   Neurological:  Negative for weakness, numbness and headaches.   Psychiatric/Behavioral:  Negative for sleep disturbance.           Physical Exam  Vitals and nursing note reviewed.   Constitutional:       General: She is not in acute distress.     Appearance: Normal appearance. She is well-developed.   HENT:      Head: Normocephalic and atraumatic.   Eyes:      General: No scleral icterus.     Extraocular Movements: Extraocular movements intact.      Conjunctiva/sclera: Conjunctivae normal.   Neck:      Thyroid: No thyromegaly.      Vascular: No JVD.   Cardiovascular:      Rate and Rhythm: Normal rate and regular rhythm.      Heart  sounds: Normal heart sounds. No murmur heard.    No friction rub. No gallop.   Pulmonary:      Effort: Pulmonary effort is normal. No respiratory distress.      Breath sounds: Normal breath sounds. No wheezing or rales.   Abdominal:      General: Bowel sounds are normal.      Palpations: Abdomen is soft. There is no mass.      Tenderness: There is no abdominal tenderness.   Musculoskeletal:         General: No tenderness. Normal range of motion.      Cervical back: Normal range of motion and neck supple.   Lymphadenopathy:      Cervical: No cervical adenopathy.   Skin:     General: Skin is warm and dry.      Findings: No rash.      Comments: No foot lesions are present.   Neurological:      Mental Status: She is alert and oriented to person, place, and time.      Cranial Nerves: No cranial nerve deficit.   Psychiatric:         Mood and Affect: Mood normal.         Behavior: Behavior normal.       Protective Sensation (w/ 10 gram monofilament):  Right: Intact  Left: Intact    Visual Inspection:  Partial ingrown left great toenail.    Pedal Pulses:   Right: Present  Left: Present    Posterior tibialis:   Right:Present  Left: Present    Lab Visit on 11/18/2022   Component Date Value Ref Range Status    Sodium 11/18/2022 140  136 - 145 mmol/L Final    Potassium 11/18/2022 3.6  3.5 - 5.1 mmol/L Final    Chloride 11/18/2022 102  95 - 110 mmol/L Final    CO2 11/18/2022 28  23 - 29 mmol/L Final    Glucose 11/18/2022 152 (H)  70 - 110 mg/dL Final    BUN 11/18/2022 12  6 - 20 mg/dL Final    Creatinine 11/18/2022 1.0  0.5 - 1.4 mg/dL Final    Calcium 11/18/2022 9.1  8.7 - 10.5 mg/dL Final    Total Protein 11/18/2022 7.1  6.0 - 8.4 g/dL Final    Albumin 11/18/2022 3.7  3.5 - 5.2 g/dL Final    Total Bilirubin 11/18/2022 0.5  0.1 - 1.0 mg/dL Final    Comment: For infants and newborns, interpretation of results should be based  on gestational age, weight and in agreement with clinical  observations.    Premature Infant recommended  reference ranges:  Up to 24 hours.............<8.0 mg/dL  Up to 48 hours............<12.0 mg/dL  3-5 days..................<15.0 mg/dL  6-29 days.................<15.0 mg/dL      Alkaline Phosphatase 11/18/2022 91  55 - 135 U/L Final    AST 11/18/2022 24  10 - 40 U/L Final    ALT 11/18/2022 29  10 - 44 U/L Final    Anion Gap 11/18/2022 10  8 - 16 mmol/L Final    eGFR 11/18/2022 >60.0  >60 mL/min/1.73 m^2 Final    Cholesterol 11/18/2022 178  120 - 199 mg/dL Final    Comment: The National Cholesterol Education Program (NCEP) has set the  following guidelines (reference ranges) for Cholesterol:  Optimal.....................<200 mg/dL  Borderline High.............200-239 mg/dL  High........................> or = 240 mg/dL      Triglycerides 11/18/2022 243 (H)  30 - 150 mg/dL Final    Comment: The National Cholesterol Education Program (NCEP) has set the  following guidelines (reference values) for triglycerides:  Normal......................<150 mg/dL  Borderline High.............150-199 mg/dL  High........................200-499 mg/dL      HDL 11/18/2022 46  40 - 75 mg/dL Final    Comment: The National Cholesterol Education Program (NCEP) has set the  following guidelines (reference values) for HDL Cholesterol:  Low...............<40 mg/dL  Optimal...........>60 mg/dL      LDL Cholesterol 11/18/2022 83.4  63.0 - 159.0 mg/dL Final    Comment: The National Cholesterol Education Program (NCEP) has set the  following guidelines (reference values) for LDL Cholesterol:  Optimal.......................<130 mg/dL  Borderline High...............130-159 mg/dL  High..........................160-189 mg/dL  Very High.....................>190 mg/dL      HDL/Cholesterol Ratio 11/18/2022 25.8  20.0 - 50.0 % Final    Total Cholesterol/HDL Ratio 11/18/2022 3.9  2.0 - 5.0 Final    Non-HDL Cholesterol 11/18/2022 132  mg/dL Final    Comment: Risk category and Non-HDL cholesterol goals:  Coronary heart disease (CHD)or equivalent (10-year  risk of CHD >20%):  Non-HDL cholesterol goal     <130 mg/dL  Two or more CHD risk factors and 10-year risk of CHD <= 20%:  Non-HDL cholesterol goal     <160 mg/dL  0 to 1 CHD risk factor:  Non-HDL cholesterol goal     <190 mg/dL      Hemoglobin A1C 11/18/2022 7.0 (H)  4.0 - 5.6 % Final    Comment: ADA Screening Guidelines:  5.7-6.4%  Consistent with prediabetes  >or=6.5%  Consistent with diabetes    High levels of fetal hemoglobin interfere with the HbA1C  assay. Heterozygous hemoglobin variants (HbS, HgC, etc)do  not significantly interfere with this assay.   However, presence of multiple variants may affect accuracy.      Estimated Avg Glucose 11/18/2022 154 (H)  68 - 131 mg/dL Final    Microalbumin, Urine 11/18/2022 13.0  ug/mL Final    Creatinine, Urine 11/18/2022 209.0  15.0 - 325.0 mg/dL Final    Microalb/Creat Ratio 11/18/2022 6.2  0.0 - 30.0 ug/mg Final       Assessment & Plan:      Hodan was seen today for follow-up, cough and nasal congestion.  Clonidine will be discontinued due to complaints of dry mouth.  The patient will continue regular blood pressure checks at home.  She will notify us if blood pressures become elevated again.    Prevnar-20 vaccine will be administered today.    Laboratory studies will be obtained in 4 months.    Diagnoses and all orders for this visit:    Type 2 diabetes mellitus without complication, without long-term current use of insulin  -     Comprehensive Metabolic Panel; Future  -     Lipid Panel; Future  -     Hemoglobin A1C; Future    Essential hypertension  -     Comprehensive Metabolic Panel; Future  -     Lipid Panel; Future  -     Hemoglobin A1C; Future    Gastroesophageal reflux disease, unspecified whether esophagitis present  -     Comprehensive Metabolic Panel; Future  -     Lipid Panel; Future  -     Hemoglobin A1C; Future    Other hyperlipidemia  -     Comprehensive Metabolic Panel; Future  -     Lipid Panel; Future  -     Hemoglobin A1C; Future    Chronic  rhinitis    Dry mouth    Other orders  -     (In Office Administered) Pneumococcal Conjugate Vaccine (20 Valent) (IM)         Follow up in about 4 months (around 4/2/2023).     Nilson Bahena MD

## 2022-12-08 ENCOUNTER — APPOINTMENT (OUTPATIENT)
Dept: RADIOLOGY | Facility: OTHER | Age: 57
End: 2022-12-08
Attending: OBSTETRICS & GYNECOLOGY
Payer: COMMERCIAL

## 2022-12-08 VITALS — WEIGHT: 242.94 LBS | BODY MASS INDEX: 40.48 KG/M2 | HEIGHT: 65 IN

## 2022-12-08 DIAGNOSIS — Z12.31 ENCOUNTER FOR SCREENING MAMMOGRAM FOR BREAST CANCER: ICD-10-CM

## 2022-12-08 PROCEDURE — 77063 MAMMO DIGITAL SCREENING BILAT WITH TOMO: ICD-10-PCS | Mod: 26,,, | Performed by: RADIOLOGY

## 2022-12-08 PROCEDURE — 77067 MAMMO DIGITAL SCREENING BILAT WITH TOMO: ICD-10-PCS | Mod: 26,,, | Performed by: RADIOLOGY

## 2022-12-08 PROCEDURE — 77067 SCR MAMMO BI INCL CAD: CPT | Mod: 26,,, | Performed by: RADIOLOGY

## 2022-12-08 PROCEDURE — 77067 SCR MAMMO BI INCL CAD: CPT | Mod: TC,PN

## 2022-12-08 PROCEDURE — 77063 BREAST TOMOSYNTHESIS BI: CPT | Mod: TC,PN

## 2022-12-08 PROCEDURE — 77063 BREAST TOMOSYNTHESIS BI: CPT | Mod: 26,,, | Performed by: RADIOLOGY

## 2022-12-29 RX ORDER — LEVOTHYROXINE SODIUM 112 UG/1
TABLET ORAL
Qty: 90 TABLET | Refills: 1 | Status: SHIPPED | OUTPATIENT
Start: 2022-12-29 | End: 2023-06-19

## 2022-12-29 NOTE — TELEPHONE ENCOUNTER
Refill Routing Note   Medication(s) are not appropriate for processing by [facility] for the following reason(s):      - Required laboratory values are outdated    [facility] action(s):  Defer Medication-related problems identified: Requires labs        Medication reconciliation completed: No     Appointments  past 12m or future 3m with PCP    Date Provider   Last Visit   12/2/2022 Nilson Bahena MD   Next Visit   5/9/2023 Nilson Bahena MD   ED visits in past 90 days: 0        Note composed:12:53 PM 12/29/2022

## 2022-12-29 NOTE — TELEPHONE ENCOUNTER
No new care gaps identified.  [facility] Embedded Care Gaps. Reference number: [id number]. 12/29/2022   8:04:03 AM CST

## 2023-04-09 NOTE — TELEPHONE ENCOUNTER
----- Message from Karissa Zhu PharmD sent at 6/5/2020  8:45 AM CDT -----  Regarding: labs  Hi Dr. Rogers,     After Cr kept trending up and K dropped, I stopped HCTZ. Since patient hasn't been taking HCTZ, I told her to hold off on KCL supplement. I ordered a repeat BMP in a couple of weeks.     Also, I noticed you commented on her BG. The past two BMPs were non-fasting. The repeat BMP in two weeks is fasting, so you can assess her BG if needed.     Thanks!  Karissa Zhu, Pharm.D.  [employer] [facility] Clinical Pharmacist  [phone]    
Thanks for the note and will f/u  
16

## 2023-04-20 RX ORDER — METFORMIN HYDROCHLORIDE 500 MG/1
TABLET ORAL
Qty: 180 TABLET | Refills: 0 | Status: SHIPPED | OUTPATIENT
Start: 2023-04-20 | End: 2023-07-10

## 2023-04-20 NOTE — TELEPHONE ENCOUNTER
Care Due:                  Date            Visit Type   Department     Provider  --------------------------------------------------------------------------------                                EP -                              PRIMARY      [facility] INTERNAL  Last Visit: 12-      CARE ([facility])   MEDICINE       Nilsonsigifredo Bahena                              EP -                              PRIMARY      [facility] INTERNAL  Next Visit: 05-      CARE ([facility])   MEDICINE       Nilsonzabrina Bahena                                                            Last  Test          Frequency    Reason                     Performed    Due Date  --------------------------------------------------------------------------------    HBA1C.......  6 months...  metFORMIN................  11- 05-    [facility] Embedded Care Gaps. Reference number: [id number]. 4/20/2023   8:02:42 AM CDT

## 2023-04-20 NOTE — TELEPHONE ENCOUNTER
Provider Staff:  Action required for this patient     Please see care gap opportunities below in Care Due Message.    Thanks!  [facility]     Appointments      Date Provider   Last Visit   12/2/2022 Nilson Baehna MD   Next Visit   5/9/2023 Nilson Bahena MD     Refill Decision Note   Hodan Hair  is requesting a refill authorization.  Brief Assessment and Rationale for Refill:  Approve     Medication Therapy Plan:         Comments:     Note composed:12:25 PM 04/20/2023             Appointments     Last Visit   12/2/2022 Nilson Bahena MD   Next Visit   5/9/2023 Nilson Bahena MD

## 2023-04-21 ENCOUNTER — PATIENT MESSAGE (OUTPATIENT)
Dept: ADMINISTRATIVE | Facility: HOSPITAL | Age: 58
End: 2023-04-21
Payer: COMMERCIAL

## 2023-05-02 ENCOUNTER — LAB VISIT (OUTPATIENT)
Dept: LAB | Facility: HOSPITAL | Age: 58
End: 2023-05-02
Attending: INTERNAL MEDICINE
Payer: COMMERCIAL

## 2023-05-02 DIAGNOSIS — E78.49 OTHER HYPERLIPIDEMIA: ICD-10-CM

## 2023-05-02 DIAGNOSIS — I10 ESSENTIAL HYPERTENSION: ICD-10-CM

## 2023-05-02 DIAGNOSIS — K21.9 GASTROESOPHAGEAL REFLUX DISEASE, UNSPECIFIED WHETHER ESOPHAGITIS PRESENT: ICD-10-CM

## 2023-05-02 DIAGNOSIS — E11.9 TYPE 2 DIABETES MELLITUS WITHOUT COMPLICATION, WITHOUT LONG-TERM CURRENT USE OF INSULIN: ICD-10-CM

## 2023-05-02 LAB
ALBUMIN SERPL BCP-MCNC: 3.6 G/DL (ref 3.5–5.2)
ALP SERPL-CCNC: 97 U/L (ref 55–135)
ALT SERPL W/O P-5'-P-CCNC: 21 U/L (ref 10–44)
ANION GAP SERPL CALC-SCNC: 7 MMOL/L (ref 8–16)
AST SERPL-CCNC: 26 U/L (ref 10–40)
BILIRUB SERPL-MCNC: 0.4 MG/DL (ref 0.1–1)
BUN SERPL-MCNC: 17 MG/DL (ref 6–20)
CALCIUM SERPL-MCNC: 9.5 MG/DL (ref 8.7–10.5)
CHLORIDE SERPL-SCNC: 101 MMOL/L (ref 95–110)
CHOLEST SERPL-MCNC: 175 MG/DL (ref 120–199)
CHOLEST/HDLC SERPL: 4.1 {RATIO} (ref 2–5)
CO2 SERPL-SCNC: 29 MMOL/L (ref 23–29)
CREAT SERPL-MCNC: 1.3 MG/DL (ref 0.5–1.4)
EST. GFR  (NO RACE VARIABLE): 48 ML/MIN/1.73 M^2
ESTIMATED AVG GLUCOSE: 143 MG/DL (ref 68–131)
GLUCOSE SERPL-MCNC: 146 MG/DL (ref 70–110)
HBA1C MFR BLD: 6.6 % (ref 4–5.6)
HDLC SERPL-MCNC: 43 MG/DL (ref 40–75)
HDLC SERPL: 24.6 % (ref 20–50)
LDLC SERPL CALC-MCNC: 88.4 MG/DL (ref 63–159)
NONHDLC SERPL-MCNC: 132 MG/DL
POTASSIUM SERPL-SCNC: 4 MMOL/L (ref 3.5–5.1)
PROT SERPL-MCNC: 7.1 G/DL (ref 6–8.4)
SODIUM SERPL-SCNC: 137 MMOL/L (ref 136–145)
TRIGL SERPL-MCNC: 218 MG/DL (ref 30–150)

## 2023-05-02 PROCEDURE — 36415 COLL VENOUS BLD VENIPUNCTURE: CPT | Mod: PO | Performed by: INTERNAL MEDICINE

## 2023-05-02 PROCEDURE — 83036 HEMOGLOBIN GLYCOSYLATED A1C: CPT | Performed by: INTERNAL MEDICINE

## 2023-05-02 PROCEDURE — 80053 COMPREHEN METABOLIC PANEL: CPT | Performed by: INTERNAL MEDICINE

## 2023-05-02 PROCEDURE — 80061 LIPID PANEL: CPT | Performed by: INTERNAL MEDICINE

## 2023-05-09 ENCOUNTER — OFFICE VISIT (OUTPATIENT)
Dept: INTERNAL MEDICINE | Facility: CLINIC | Age: 58
End: 2023-05-09
Payer: COMMERCIAL

## 2023-05-09 VITALS
TEMPERATURE: 97 F | HEART RATE: 60 BPM | BODY MASS INDEX: 39.67 KG/M2 | DIASTOLIC BLOOD PRESSURE: 64 MMHG | RESPIRATION RATE: 16 BRPM | WEIGHT: 238.13 LBS | SYSTOLIC BLOOD PRESSURE: 110 MMHG | HEIGHT: 65 IN

## 2023-05-09 DIAGNOSIS — E11.9 TYPE 2 DIABETES MELLITUS WITHOUT COMPLICATION, WITHOUT LONG-TERM CURRENT USE OF INSULIN: Primary | ICD-10-CM

## 2023-05-09 DIAGNOSIS — G89.29 CHRONIC LEFT-SIDED LOW BACK PAIN WITHOUT SCIATICA: ICD-10-CM

## 2023-05-09 DIAGNOSIS — I10 ESSENTIAL HYPERTENSION: ICD-10-CM

## 2023-05-09 DIAGNOSIS — F90.2 ATTENTION DEFICIT HYPERACTIVITY DISORDER (ADHD), COMBINED TYPE: ICD-10-CM

## 2023-05-09 DIAGNOSIS — E78.49 OTHER HYPERLIPIDEMIA: ICD-10-CM

## 2023-05-09 DIAGNOSIS — K21.9 GASTROESOPHAGEAL REFLUX DISEASE, UNSPECIFIED WHETHER ESOPHAGITIS PRESENT: ICD-10-CM

## 2023-05-09 DIAGNOSIS — L98.9 SKIN LESIONS: ICD-10-CM

## 2023-05-09 DIAGNOSIS — N18.31 STAGE 3A CHRONIC KIDNEY DISEASE: ICD-10-CM

## 2023-05-09 DIAGNOSIS — M54.50 CHRONIC LEFT-SIDED LOW BACK PAIN WITHOUT SCIATICA: ICD-10-CM

## 2023-05-09 DIAGNOSIS — E66.01 CLASS 2 SEVERE OBESITY WITH SERIOUS COMORBIDITY AND BODY MASS INDEX (BMI) OF 39.0 TO 39.9 IN ADULT, UNSPECIFIED OBESITY TYPE: ICD-10-CM

## 2023-05-09 PROBLEM — E66.812 CLASS 2 SEVERE OBESITY WITH SERIOUS COMORBIDITY AND BODY MASS INDEX (BMI) OF 39.0 TO 39.9 IN ADULT, UNSPECIFIED OBESITY TYPE: Status: ACTIVE | Noted: 2023-05-09

## 2023-05-09 PROCEDURE — 3008F PR BODY MASS INDEX (BMI) DOCUMENTED: ICD-10-PCS | Mod: CPTII,S$GLB,, | Performed by: INTERNAL MEDICINE

## 2023-05-09 PROCEDURE — 1160F RVW MEDS BY RX/DR IN RCRD: CPT | Mod: CPTII,S$GLB,, | Performed by: INTERNAL MEDICINE

## 2023-05-09 PROCEDURE — 1160F PR REVIEW ALL MEDS BY PRESCRIBER/CLIN PHARMACIST DOCUMENTED: ICD-10-PCS | Mod: CPTII,S$GLB,, | Performed by: INTERNAL MEDICINE

## 2023-05-09 PROCEDURE — 3074F SYST BP LT 130 MM HG: CPT | Mod: CPTII,S$GLB,, | Performed by: INTERNAL MEDICINE

## 2023-05-09 PROCEDURE — 99214 OFFICE O/P EST MOD 30 MIN: CPT | Mod: S$GLB,,, | Performed by: INTERNAL MEDICINE

## 2023-05-09 PROCEDURE — 3078F DIAST BP <80 MM HG: CPT | Mod: CPTII,S$GLB,, | Performed by: INTERNAL MEDICINE

## 2023-05-09 PROCEDURE — 1159F MED LIST DOCD IN RCRD: CPT | Mod: CPTII,S$GLB,, | Performed by: INTERNAL MEDICINE

## 2023-05-09 PROCEDURE — 99214 PR OFFICE/OUTPT VISIT, EST, LEVL IV, 30-39 MIN: ICD-10-PCS | Mod: S$GLB,,, | Performed by: INTERNAL MEDICINE

## 2023-05-09 PROCEDURE — 99999 PR PBB SHADOW E&M-EST. PATIENT-LVL V: ICD-10-PCS | Mod: PBBFAC,,, | Performed by: INTERNAL MEDICINE

## 2023-05-09 PROCEDURE — 3074F PR MOST RECENT SYSTOLIC BLOOD PRESSURE < 130 MM HG: ICD-10-PCS | Mod: CPTII,S$GLB,, | Performed by: INTERNAL MEDICINE

## 2023-05-09 PROCEDURE — 3044F PR MOST RECENT HEMOGLOBIN A1C LEVEL <7.0%: ICD-10-PCS | Mod: CPTII,S$GLB,, | Performed by: INTERNAL MEDICINE

## 2023-05-09 PROCEDURE — 1159F PR MEDICATION LIST DOCUMENTED IN MEDICAL RECORD: ICD-10-PCS | Mod: CPTII,S$GLB,, | Performed by: INTERNAL MEDICINE

## 2023-05-09 PROCEDURE — 99999 PR PBB SHADOW E&M-EST. PATIENT-LVL V: CPT | Mod: PBBFAC,,, | Performed by: INTERNAL MEDICINE

## 2023-05-09 PROCEDURE — 3008F BODY MASS INDEX DOCD: CPT | Mod: CPTII,S$GLB,, | Performed by: INTERNAL MEDICINE

## 2023-05-09 PROCEDURE — 3044F HG A1C LEVEL LT 7.0%: CPT | Mod: CPTII,S$GLB,, | Performed by: INTERNAL MEDICINE

## 2023-05-09 PROCEDURE — 3078F PR MOST RECENT DIASTOLIC BLOOD PRESSURE < 80 MM HG: ICD-10-PCS | Mod: CPTII,S$GLB,, | Performed by: INTERNAL MEDICINE

## 2023-05-11 RX ORDER — EZETIMIBE AND SIMVASTATIN 10; 10 MG/1; MG/1
TABLET ORAL
Qty: 90 TABLET | Refills: 3 | Status: SHIPPED | OUTPATIENT
Start: 2023-05-11

## 2023-05-11 RX ORDER — PANTOPRAZOLE SODIUM 40 MG/1
TABLET, DELAYED RELEASE ORAL
Qty: 90 TABLET | Refills: 3 | Status: SHIPPED | OUTPATIENT
Start: 2023-05-11

## 2023-05-11 NOTE — TELEPHONE ENCOUNTER
Refill Routing Note   Medication(s) are not appropriate for processing by [facility] for the following reason(s):      No active prescription written by PCP    [facility] action(s):  Defer None identified          Appointments  past 12m or future 3m with PCP    Date Provider   Last Visit   5/9/2023 Nilson Bahena MD   Next Visit   9/28/2023 Nilson Bahena MD   ED visits in past 90 days: 0        Note composed:10:24 AM 05/11/2023

## 2023-05-11 NOTE — TELEPHONE ENCOUNTER
No care due was identified.  [facility] Embedded Care Due Messages. Reference number: [id number].   5/11/2023 8:01:14 AM CDT

## 2023-05-11 NOTE — TELEPHONE ENCOUNTER
No care due was identified.  [facility] Embedded Care Due Messages. Reference number: [id number].   5/11/2023 8:01:47 AM CDT

## 2023-05-11 NOTE — TELEPHONE ENCOUNTER
Refill Decision Note   Hodan Hair  is requesting a refill authorization.  Brief Assessment and Rationale for Refill:  Approve     Medication Therapy Plan:       Medication Reconciliation Completed: No   Comments:     No Care Gaps recommended.     Note composed:11:40 AM 05/11/2023

## 2023-05-22 NOTE — PROGRESS NOTES
Subjective:       Patient ID: Hodan Hair is a 57 y.o. female.    Chief Complaint: Diabetes (4 mos fol up w/labs)    HPI  The patient presents for follow-up of medical conditions which include type 2 diabetes mellitus going to hypertension, hyperlipidemia, ADHD, chronic lower back pain.  Decreased GFR has been noted.  The patient also will like to have several facial lesions evaluated.    The patient has not experienced any recent episodes of acid reflux.  She has been using Protonix at bedtime.  Dry mouth has improved with discontinuation of clonidine.  She is tolerating her other blood pressure medications well without side effects.  She does not use NSAIDs regularly.    She has not experienced any recent symptoms of restless leg syndrome.  She discontinued tizanidine.  She uses melatonin in a dose of 2-4 mg at night to help with sleep.    Review of Systems   Constitutional:  Negative for activity change, appetite change and unexpected weight change.   Eyes:  Negative for visual disturbance.   Respiratory:  Negative for shortness of breath.    Cardiovascular:  Negative for chest pain, palpitations and leg swelling.   Gastrointestinal:  Negative for abdominal pain, blood in stool and diarrhea.   Genitourinary:  Negative for dysuria, frequency, hematuria and urgency.   Integumentary:  Positive for mole/lesion.   Neurological:  Negative for weakness, numbness and headaches.   Psychiatric/Behavioral:  Negative for sleep disturbance.           Physical Exam  Vitals and nursing note reviewed.   Constitutional:       General: She is not in acute distress.     Appearance: Normal appearance. She is well-developed.      Comments: The patient has lost 4 lb since 12/02/2022.   HENT:      Head: Normocephalic and atraumatic.   Eyes:      General: No scleral icterus.     Extraocular Movements: Extraocular movements intact.      Conjunctiva/sclera: Conjunctivae normal.   Neck:      Thyroid: No thyromegaly.      Vascular: No  JVD.   Cardiovascular:      Rate and Rhythm: Normal rate and regular rhythm.      Heart sounds: Normal heart sounds. No murmur heard.    No friction rub. No gallop.   Pulmonary:      Effort: Pulmonary effort is normal. No respiratory distress.      Breath sounds: Normal breath sounds. No wheezing or rales.   Abdominal:      General: Bowel sounds are normal.      Palpations: Abdomen is soft. There is no mass.      Tenderness: There is no abdominal tenderness.   Musculoskeletal:         General: No tenderness. Normal range of motion.      Cervical back: Normal range of motion and neck supple.      Comments: Back:  Equivocal straight leg raising test bilaterally.    Hips:  Range of motion is intact.    Knees:  Normal range of motion.  No effusion is noted.   Lymphadenopathy:      Cervical: No cervical adenopathy.   Skin:     General: Skin is warm and dry.      Findings: No rash.      Comments: Two raised flesh-colored lesions of the right malar area.    Pigmented lesion is noted on the right side of the face.   Neurological:      Mental Status: She is alert and oriented to person, place, and time.      Cranial Nerves: No cranial nerve deficit.   Psychiatric:         Mood and Affect: Mood normal.         Behavior: Behavior normal.       Protective Sensation (w/ 10 gram monofilament):  Right: Intact  Left: Intact    Visual Inspection:  Left great toe with ingrown nail noted.    Pedal Pulses:   Right: Present  Left: Present    Posterior Tibialis Pulses:   Right:Present  Left: Present    Lab Visit on 05/02/2023   Component Date Value Ref Range Status    Sodium 05/02/2023 137  136 - 145 mmol/L Final    Potassium 05/02/2023 4.0  3.5 - 5.1 mmol/L Final    Chloride 05/02/2023 101  95 - 110 mmol/L Final    CO2 05/02/2023 29  23 - 29 mmol/L Final    Glucose 05/02/2023 146 (H)  70 - 110 mg/dL Final    BUN 05/02/2023 17  6 - 20 mg/dL Final    Creatinine 05/02/2023 1.3  0.5 - 1.4 mg/dL Final    Calcium 05/02/2023 9.5  8.7 - 10.5  mg/dL Final    Total Protein 05/02/2023 7.1  6.0 - 8.4 g/dL Final    Albumin 05/02/2023 3.6  3.5 - 5.2 g/dL Final    Total Bilirubin 05/02/2023 0.4  0.1 - 1.0 mg/dL Final    Comment: For infants and newborns, interpretation of results should be based  on gestational age, weight and in agreement with clinical  observations.    Premature Infant recommended reference ranges:  Up to 24 hours.............<8.0 mg/dL  Up to 48 hours............<12.0 mg/dL  3-5 days..................<15.0 mg/dL  6-29 days.................<15.0 mg/dL      Alkaline Phosphatase 05/02/2023 97  55 - 135 U/L Final    AST 05/02/2023 26  10 - 40 U/L Final    ALT 05/02/2023 21  10 - 44 U/L Final    Anion Gap 05/02/2023 7 (L)  8 - 16 mmol/L Final    eGFR 05/02/2023 48.0 (A)  >60 mL/min/1.73 m^2 Final    Cholesterol 05/02/2023 175  120 - 199 mg/dL Final    Comment: The National Cholesterol Education Program (NCEP) has set the  following guidelines (reference ranges) for Cholesterol:  Optimal.....................<200 mg/dL  Borderline High.............200-239 mg/dL  High........................> or = 240 mg/dL      Triglycerides 05/02/2023 218 (H)  30 - 150 mg/dL Final    Comment: The National Cholesterol Education Program (NCEP) has set the  following guidelines (reference values) for triglycerides:  Normal......................<150 mg/dL  Borderline High.............150-199 mg/dL  High........................200-499 mg/dL      HDL 05/02/2023 43  40 - 75 mg/dL Final    Comment: The National Cholesterol Education Program (NCEP) has set the  following guidelines (reference values) for HDL Cholesterol:  Low...............<40 mg/dL  Optimal...........>60 mg/dL      LDL Cholesterol 05/02/2023 88.4  63.0 - 159.0 mg/dL Final    Comment: The National Cholesterol Education Program (NCEP) has set the  following guidelines (reference values) for LDL Cholesterol:  Optimal.......................<130 mg/dL  Borderline High...............130-159  mg/dL  High..........................160-189 mg/dL  Very High.....................>190 mg/dL      HDL/Cholesterol Ratio 05/02/2023 24.6  20.0 - 50.0 % Final    Total Cholesterol/HDL Ratio 05/02/2023 4.1  2.0 - 5.0 Final    Non-HDL Cholesterol 05/02/2023 132  mg/dL Final    Comment: Risk category and Non-HDL cholesterol goals:  Coronary heart disease (CHD)or equivalent (10-year risk of CHD >20%):  Non-HDL cholesterol goal     <130 mg/dL  Two or more CHD risk factors and 10-year risk of CHD <= 20%:  Non-HDL cholesterol goal     <160 mg/dL  0 to 1 CHD risk factor:  Non-HDL cholesterol goal     <190 mg/dL      Hemoglobin A1C 05/02/2023 6.6 (H)  4.0 - 5.6 % Final    Comment: ADA Screening Guidelines:  5.7-6.4%  Consistent with prediabetes  >or=6.5%  Consistent with diabetes    High levels of fetal hemoglobin interfere with the HbA1C  assay. Heterozygous hemoglobin variants (HbS, HgC, etc)do  not significantly interfere with this assay.   However, presence of multiple variants may affect accuracy.      Estimated Avg Glucose 05/02/2023 143 (H)  68 - 131 mg/dL Final       Assessment & Plan:      Hodan was seen today for diabetes.  Current therapy will be continued for management of diabetes and hypertension.  The patient has been encouraged to increase oral intake of water.  She should continue to avoid NSAIDs.  She should follow a low-sodium diet and avoid soft drinks.  A nonfasting BMP will be obtained in 1 month to check kidney function.  Routine labs will be repeated in 4 months.    Dermatology consultation will be obtained regarding several irritated facial lesions.    The patient has been reminded to submit her Cologuard test for colon cancer screening as previously discussed.  She has the kit at home but has not sent it in yet.      Diagnoses and all orders for this visit:    Type 2 diabetes mellitus without complication, without long-term current use of insulin  -     Basic Metabolic Panel; Future  -     Hemoglobin  A1C; Future  -     Comprehensive Metabolic Panel; Future  -     Lipid Panel; Future    Class 2 severe obesity with serious comorbidity and body mass index (BMI) of 39.0 to 39.9 in adult, unspecified obesity type  -     Basic Metabolic Panel; Future  -     Hemoglobin A1C; Future  -     Comprehensive Metabolic Panel; Future  -     Lipid Panel; Future    Stage 3a chronic kidney disease  -     Basic Metabolic Panel; Future  -     Hemoglobin A1C; Future  -     Comprehensive Metabolic Panel; Future  -     Lipid Panel; Future    Attention deficit hyperactivity disorder (ADHD), combined type    Skin lesions  -     Ambulatory referral/consult to Dermatology; Future    Gastroesophageal reflux disease, unspecified whether esophagitis present    Essential hypertension    Other hyperlipidemia    Chronic left-sided low back pain without sciatica         Follow up in about 4 months (around 9/9/2023).     Nilson Bahena MD

## 2023-05-30 LAB
LEFT EYE DM RETINOPATHY: NEGATIVE
RIGHT EYE DM RETINOPATHY: NEGATIVE

## 2023-06-02 RX ORDER — AMLODIPINE BESYLATE 5 MG/1
TABLET ORAL
Qty: 90 TABLET | Refills: 3 | Status: SHIPPED | OUTPATIENT
Start: 2023-06-02

## 2023-06-02 NOTE — TELEPHONE ENCOUNTER
Refill Decision Note      Refill Decision Note   Hodan Hair  is requesting a refill authorization.  Brief Assessment and Rationale for Refill:  Approve     Medication Therapy Plan:         Comments:     Note composed:8:49 AM 06/02/2023             Appointments     Last Visit   5/9/2023 Nilson Bahena MD   Next Visit   9/28/2023 Nilson Bahena MD

## 2023-06-02 NOTE — TELEPHONE ENCOUNTER
No care due was identified.  [facility] Embedded Care Due Messages. Reference number: [id number].   6/02/2023 8:05:15 AM CDT

## 2023-06-06 ENCOUNTER — LAB VISIT (OUTPATIENT)
Dept: LAB | Facility: HOSPITAL | Age: 58
End: 2023-06-06
Attending: INTERNAL MEDICINE
Payer: COMMERCIAL

## 2023-06-06 DIAGNOSIS — N18.31 STAGE 3A CHRONIC KIDNEY DISEASE: ICD-10-CM

## 2023-06-06 DIAGNOSIS — E66.01 CLASS 2 SEVERE OBESITY WITH SERIOUS COMORBIDITY AND BODY MASS INDEX (BMI) OF 39.0 TO 39.9 IN ADULT, UNSPECIFIED OBESITY TYPE: ICD-10-CM

## 2023-06-06 DIAGNOSIS — E11.9 TYPE 2 DIABETES MELLITUS WITHOUT COMPLICATION, WITHOUT LONG-TERM CURRENT USE OF INSULIN: ICD-10-CM

## 2023-06-06 PROCEDURE — 80048 BASIC METABOLIC PNL TOTAL CA: CPT | Performed by: INTERNAL MEDICINE

## 2023-06-06 PROCEDURE — 36415 COLL VENOUS BLD VENIPUNCTURE: CPT | Mod: PO | Performed by: INTERNAL MEDICINE

## 2023-06-07 ENCOUNTER — PATIENT OUTREACH (OUTPATIENT)
Dept: ADMINISTRATIVE | Facility: HOSPITAL | Age: 58
End: 2023-06-07
Payer: COMMERCIAL

## 2023-06-07 LAB
ANION GAP SERPL CALC-SCNC: 8 MMOL/L (ref 8–16)
BUN SERPL-MCNC: 18 MG/DL (ref 6–20)
CALCIUM SERPL-MCNC: 10 MG/DL (ref 8.7–10.5)
CHLORIDE SERPL-SCNC: 101 MMOL/L (ref 95–110)
CO2 SERPL-SCNC: 31 MMOL/L (ref 23–29)
CREAT SERPL-MCNC: 1.1 MG/DL (ref 0.5–1.4)
EST. GFR  (NO RACE VARIABLE): 58.6 ML/MIN/1.73 M^2
GLUCOSE SERPL-MCNC: 124 MG/DL (ref 70–110)
POTASSIUM SERPL-SCNC: 3.5 MMOL/L (ref 3.5–5.1)
SODIUM SERPL-SCNC: 140 MMOL/L (ref 136–145)

## 2023-06-08 ENCOUNTER — TELEPHONE (OUTPATIENT)
Dept: INTERNAL MEDICINE | Facility: CLINIC | Age: 58
End: 2023-06-08
Payer: COMMERCIAL

## 2023-06-08 ENCOUNTER — PATIENT MESSAGE (OUTPATIENT)
Dept: INTERNAL MEDICINE | Facility: CLINIC | Age: 58
End: 2023-06-08
Payer: COMMERCIAL

## 2023-06-08 NOTE — TELEPHONE ENCOUNTER
----- Message from Nilson Bahena MD sent at 6/7/2023  7:18 PM CDT -----  Blood test showed improvement in kidney function with increased GFR value.  Blood glucose was 124.

## 2023-06-12 ENCOUNTER — PATIENT MESSAGE (OUTPATIENT)
Dept: INTERNAL MEDICINE | Facility: CLINIC | Age: 58
End: 2023-06-12
Payer: COMMERCIAL

## 2023-06-18 NOTE — TELEPHONE ENCOUNTER
No care due was identified.  [facility] Embedded Care Due Messages. Reference number: [id number].   6/18/2023 8:05:08 AM CDT

## 2023-06-19 RX ORDER — LEVOTHYROXINE SODIUM 112 UG/1
TABLET ORAL
Qty: 90 TABLET | Refills: 1 | Status: SHIPPED | OUTPATIENT
Start: 2023-06-19 | End: 2023-12-11

## 2023-06-19 NOTE — TELEPHONE ENCOUNTER
Refill Routing Note   Medication(s) are not appropriate for processing by [facility] for the following reason(s):      Required labs outdated    [facility] action(s):  Defer None identified            Appointments  past 12m or future 3m with PCP    Date Provider   Last Visit   5/9/2023 Nilson Bahena MD   Next Visit   9/28/2023 Nilson Bahena MD   ED visits in past 90 days: 0        Note composed:4:19 AM 06/19/2023

## 2023-06-22 DIAGNOSIS — I10 HYPERTENSION, UNSPECIFIED TYPE: ICD-10-CM

## 2023-06-22 RX ORDER — CARVEDILOL 25 MG/1
TABLET ORAL
Qty: 180 TABLET | Refills: 3 | Status: SHIPPED | OUTPATIENT
Start: 2023-06-22

## 2023-06-22 NOTE — TELEPHONE ENCOUNTER
Refill Decision Note      Refill Decision Note   Hodan Hair  is requesting a refill authorization.  Brief Assessment and Rationale for Refill:  Approve     Medication Therapy Plan:         Comments:     Note composed:10:16 AM 06/22/2023             Appointments     Last Visit   5/9/2023 Nilson Bahena MD   Next Visit   9/28/2023 Nilson Bahena MD

## 2023-06-22 NOTE — TELEPHONE ENCOUNTER
No care due was identified.  [facility] Embedded Care Due Messages. Reference number: [id number].   6/22/2023 8:04:57 AM CDT

## 2023-07-10 RX ORDER — METFORMIN HYDROCHLORIDE 500 MG/1
TABLET ORAL
Qty: 180 TABLET | Refills: 1 | Status: SHIPPED | OUTPATIENT
Start: 2023-07-10 | End: 2023-12-19

## 2023-07-10 NOTE — TELEPHONE ENCOUNTER
No care due was identified.  [facility] Embedded Care Due Messages. Reference number: [id number].   7/10/2023 10:59:19 AM CDT

## 2023-07-11 NOTE — TELEPHONE ENCOUNTER
Refill Decision Note   Hodan Hair  is requesting a refill authorization.  Brief Assessment and Rationale for Refill:  Approve     Medication Therapy Plan:       Medication Reconciliation Completed: No   Comments:     No Care Gaps recommended.     Note composed:9:03 PM 07/10/2023

## 2023-07-25 DIAGNOSIS — I10 HYPERTENSION, UNSPECIFIED TYPE: ICD-10-CM

## 2023-07-25 RX ORDER — HYDROCHLOROTHIAZIDE 12.5 MG/1
TABLET ORAL
Qty: 90 TABLET | Refills: 3 | Status: SHIPPED | OUTPATIENT
Start: 2023-07-25

## 2023-07-25 NOTE — TELEPHONE ENCOUNTER
No care due was identified.  [facility] Embedded Care Due Messages. Reference number: [id number].   7/25/2023 8:05:00 AM CDT

## 2023-07-25 NOTE — TELEPHONE ENCOUNTER
Refill Decision Note   Hodan Hair  is requesting a refill authorization.  Brief Assessment and Rationale for Refill:  Approve     Medication Therapy Plan:         Comments:     Note composed:11:26 AM 07/25/2023

## 2023-09-22 ENCOUNTER — LAB VISIT (OUTPATIENT)
Dept: LAB | Facility: HOSPITAL | Age: 58
End: 2023-09-22
Attending: INTERNAL MEDICINE
Payer: COMMERCIAL

## 2023-09-22 DIAGNOSIS — E66.01 CLASS 2 SEVERE OBESITY WITH SERIOUS COMORBIDITY AND BODY MASS INDEX (BMI) OF 39.0 TO 39.9 IN ADULT, UNSPECIFIED OBESITY TYPE: ICD-10-CM

## 2023-09-22 DIAGNOSIS — N18.31 STAGE 3A CHRONIC KIDNEY DISEASE: ICD-10-CM

## 2023-09-22 DIAGNOSIS — E11.9 TYPE 2 DIABETES MELLITUS WITHOUT COMPLICATION, WITHOUT LONG-TERM CURRENT USE OF INSULIN: ICD-10-CM

## 2023-09-22 LAB
ALBUMIN SERPL BCP-MCNC: 3.6 G/DL (ref 3.5–5.2)
ALP SERPL-CCNC: 94 U/L (ref 55–135)
ALT SERPL W/O P-5'-P-CCNC: 18 U/L (ref 10–44)
ANION GAP SERPL CALC-SCNC: 14 MMOL/L (ref 8–16)
AST SERPL-CCNC: 18 U/L (ref 10–40)
BILIRUB SERPL-MCNC: 0.4 MG/DL (ref 0.1–1)
BUN SERPL-MCNC: 17 MG/DL (ref 6–20)
CALCIUM SERPL-MCNC: 9.3 MG/DL (ref 8.7–10.5)
CHLORIDE SERPL-SCNC: 100 MMOL/L (ref 95–110)
CHOLEST SERPL-MCNC: 196 MG/DL (ref 120–199)
CHOLEST/HDLC SERPL: 4.4 {RATIO} (ref 2–5)
CO2 SERPL-SCNC: 25 MMOL/L (ref 23–29)
CREAT SERPL-MCNC: 1.3 MG/DL (ref 0.5–1.4)
EST. GFR  (NO RACE VARIABLE): 47.7 ML/MIN/1.73 M^2
ESTIMATED AVG GLUCOSE: 146 MG/DL (ref 68–131)
GLUCOSE SERPL-MCNC: 154 MG/DL (ref 70–110)
HBA1C MFR BLD: 6.7 % (ref 4–5.6)
HDLC SERPL-MCNC: 45 MG/DL (ref 40–75)
HDLC SERPL: 23 % (ref 20–50)
LDLC SERPL CALC-MCNC: 90.4 MG/DL (ref 63–159)
NONHDLC SERPL-MCNC: 151 MG/DL
POTASSIUM SERPL-SCNC: 3.4 MMOL/L (ref 3.5–5.1)
PROT SERPL-MCNC: 7.4 G/DL (ref 6–8.4)
SODIUM SERPL-SCNC: 139 MMOL/L (ref 136–145)
TRIGL SERPL-MCNC: 303 MG/DL (ref 30–150)

## 2023-09-22 PROCEDURE — 80053 COMPREHEN METABOLIC PANEL: CPT | Performed by: INTERNAL MEDICINE

## 2023-09-22 PROCEDURE — 80061 LIPID PANEL: CPT | Performed by: INTERNAL MEDICINE

## 2023-09-22 PROCEDURE — 83036 HEMOGLOBIN GLYCOSYLATED A1C: CPT | Performed by: INTERNAL MEDICINE

## 2023-09-22 PROCEDURE — 36415 COLL VENOUS BLD VENIPUNCTURE: CPT | Mod: PO | Performed by: INTERNAL MEDICINE

## 2023-09-28 ENCOUNTER — OFFICE VISIT (OUTPATIENT)
Dept: INTERNAL MEDICINE | Facility: CLINIC | Age: 58
End: 2023-09-28
Payer: COMMERCIAL

## 2023-09-28 VITALS
BODY MASS INDEX: 40.04 KG/M2 | HEART RATE: 77 BPM | TEMPERATURE: 98 F | HEIGHT: 65 IN | DIASTOLIC BLOOD PRESSURE: 72 MMHG | OXYGEN SATURATION: 99 % | WEIGHT: 240.31 LBS | SYSTOLIC BLOOD PRESSURE: 118 MMHG | RESPIRATION RATE: 16 BRPM

## 2023-09-28 DIAGNOSIS — E66.01 CLASS 2 SEVERE OBESITY WITH SERIOUS COMORBIDITY AND BODY MASS INDEX (BMI) OF 39.0 TO 39.9 IN ADULT, UNSPECIFIED OBESITY TYPE: ICD-10-CM

## 2023-09-28 DIAGNOSIS — J31.0 CHRONIC RHINITIS: ICD-10-CM

## 2023-09-28 DIAGNOSIS — E11.9 TYPE 2 DIABETES MELLITUS WITHOUT COMPLICATION, WITHOUT LONG-TERM CURRENT USE OF INSULIN: Primary | ICD-10-CM

## 2023-09-28 DIAGNOSIS — E78.49 OTHER HYPERLIPIDEMIA: ICD-10-CM

## 2023-09-28 DIAGNOSIS — Z12.31 BREAST CANCER SCREENING BY MAMMOGRAM: ICD-10-CM

## 2023-09-28 DIAGNOSIS — I10 ESSENTIAL HYPERTENSION: ICD-10-CM

## 2023-09-28 PROCEDURE — 3008F BODY MASS INDEX DOCD: CPT | Mod: CPTII,S$GLB,, | Performed by: INTERNAL MEDICINE

## 2023-09-28 PROCEDURE — 1160F PR REVIEW ALL MEDS BY PRESCRIBER/CLIN PHARMACIST DOCUMENTED: ICD-10-PCS | Mod: CPTII,S$GLB,, | Performed by: INTERNAL MEDICINE

## 2023-09-28 PROCEDURE — 90471 FLU VACCINE (QUAD) GREATER THAN OR EQUAL TO 3YO PRESERVATIVE FREE IM: ICD-10-PCS | Mod: S$GLB,,, | Performed by: INTERNAL MEDICINE

## 2023-09-28 PROCEDURE — 3078F PR MOST RECENT DIASTOLIC BLOOD PRESSURE < 80 MM HG: ICD-10-PCS | Mod: CPTII,S$GLB,, | Performed by: INTERNAL MEDICINE

## 2023-09-28 PROCEDURE — 3044F PR MOST RECENT HEMOGLOBIN A1C LEVEL <7.0%: ICD-10-PCS | Mod: CPTII,S$GLB,, | Performed by: INTERNAL MEDICINE

## 2023-09-28 PROCEDURE — 2023F DILAT RTA XM W/O RTNOPTHY: CPT | Mod: CPTII,S$GLB,, | Performed by: INTERNAL MEDICINE

## 2023-09-28 PROCEDURE — 90686 IIV4 VACC NO PRSV 0.5 ML IM: CPT | Mod: S$GLB,,, | Performed by: INTERNAL MEDICINE

## 2023-09-28 PROCEDURE — 90471 IMMUNIZATION ADMIN: CPT | Mod: S$GLB,,, | Performed by: INTERNAL MEDICINE

## 2023-09-28 PROCEDURE — 2023F PR DILATED RETINAL EXAM W/O EVID OF RETINOPATHY: ICD-10-PCS | Mod: CPTII,S$GLB,, | Performed by: INTERNAL MEDICINE

## 2023-09-28 PROCEDURE — 3078F DIAST BP <80 MM HG: CPT | Mod: CPTII,S$GLB,, | Performed by: INTERNAL MEDICINE

## 2023-09-28 PROCEDURE — 99214 OFFICE O/P EST MOD 30 MIN: CPT | Mod: 25,S$GLB,, | Performed by: INTERNAL MEDICINE

## 2023-09-28 PROCEDURE — 90686 FLU VACCINE (QUAD) GREATER THAN OR EQUAL TO 3YO PRESERVATIVE FREE IM: ICD-10-PCS | Mod: S$GLB,,, | Performed by: INTERNAL MEDICINE

## 2023-09-28 PROCEDURE — 3008F PR BODY MASS INDEX (BMI) DOCUMENTED: ICD-10-PCS | Mod: CPTII,S$GLB,, | Performed by: INTERNAL MEDICINE

## 2023-09-28 PROCEDURE — 99999 PR PBB SHADOW E&M-EST. PATIENT-LVL V: ICD-10-PCS | Mod: PBBFAC,,, | Performed by: INTERNAL MEDICINE

## 2023-09-28 PROCEDURE — 3074F PR MOST RECENT SYSTOLIC BLOOD PRESSURE < 130 MM HG: ICD-10-PCS | Mod: CPTII,S$GLB,, | Performed by: INTERNAL MEDICINE

## 2023-09-28 PROCEDURE — 1159F PR MEDICATION LIST DOCUMENTED IN MEDICAL RECORD: ICD-10-PCS | Mod: CPTII,S$GLB,, | Performed by: INTERNAL MEDICINE

## 2023-09-28 PROCEDURE — 99214 PR OFFICE/OUTPT VISIT, EST, LEVL IV, 30-39 MIN: ICD-10-PCS | Mod: 25,S$GLB,, | Performed by: INTERNAL MEDICINE

## 2023-09-28 PROCEDURE — 1159F MED LIST DOCD IN RCRD: CPT | Mod: CPTII,S$GLB,, | Performed by: INTERNAL MEDICINE

## 2023-09-28 PROCEDURE — 3074F SYST BP LT 130 MM HG: CPT | Mod: CPTII,S$GLB,, | Performed by: INTERNAL MEDICINE

## 2023-09-28 PROCEDURE — 3044F HG A1C LEVEL LT 7.0%: CPT | Mod: CPTII,S$GLB,, | Performed by: INTERNAL MEDICINE

## 2023-09-28 PROCEDURE — 1160F RVW MEDS BY RX/DR IN RCRD: CPT | Mod: CPTII,S$GLB,, | Performed by: INTERNAL MEDICINE

## 2023-09-28 PROCEDURE — 99999 PR PBB SHADOW E&M-EST. PATIENT-LVL V: CPT | Mod: PBBFAC,,, | Performed by: INTERNAL MEDICINE

## 2023-10-01 NOTE — PROGRESS NOTES
Subjective:       Patient ID: Hodan Hair is a 58 y.o. female.    Chief Complaint: Follow-up    HPI  The patient presents for follow-up of medical conditions which include type 2 diabetes mellitus, hypertension, hyperlipidemia, and chronic rhinitis.  The patient states he generally has been doing well.  She has been using Vyvanse with good results for treatment of her attention deficit disorder.  She feels she may need to use the medication twice daily at times to remain more focused.  She has not been monitoring blood sugars lately.  She is tolerating her medication well without side effects.  No hypoglycemia has been noted.    Review of Systems   Constitutional:  Negative for activity change, appetite change and unexpected weight change.   Eyes:  Negative for visual disturbance.   Respiratory:  Negative for shortness of breath.    Cardiovascular:  Negative for chest pain, palpitations and leg swelling.   Gastrointestinal:  Negative for abdominal pain, blood in stool and diarrhea.   Genitourinary:  Negative for dysuria, frequency, hematuria and urgency.   Neurological:  Negative for weakness, numbness and headaches.   Psychiatric/Behavioral:  Negative for sleep disturbance.             Physical Exam  Vitals and nursing note reviewed.   Constitutional:       General: She is not in acute distress.     Appearance: Normal appearance. She is well-developed.   HENT:      Head: Normocephalic and atraumatic.   Eyes:      General: No scleral icterus.     Extraocular Movements: Extraocular movements intact.      Conjunctiva/sclera: Conjunctivae normal.   Neck:      Thyroid: No thyromegaly.      Vascular: No JVD.   Cardiovascular:      Rate and Rhythm: Normal rate and regular rhythm.      Heart sounds: Normal heart sounds. No murmur heard.     No friction rub. No gallop.   Pulmonary:      Effort: Pulmonary effort is normal. No respiratory distress.      Breath sounds: Normal breath sounds. No wheezing or rales.    Abdominal:      General: Bowel sounds are normal.      Palpations: Abdomen is soft. There is no mass.      Tenderness: There is no abdominal tenderness.   Musculoskeletal:         General: No tenderness. Normal range of motion.      Cervical back: Normal range of motion and neck supple.   Lymphadenopathy:      Cervical: No cervical adenopathy.   Skin:     General: Skin is warm and dry.      Findings: No rash.      Comments: No foot lesions are present.   Neurological:      Mental Status: She is alert and oriented to person, place, and time.      Cranial Nerves: No cranial nerve deficit.      Comments: Sensory exam is intact in both feet on monofilament testing.   Psychiatric:         Mood and Affect: Mood normal.         Behavior: Behavior normal.       Protective Sensation (w/ 10 gram monofilament):  Right: Intact  Left: Intact    Visual Inspection:  Normal -  Bilateral    Pedal Pulses:   Right: Present  Left: Present    Posterior Tibialis Pulses:   Right:Present  Left: Present      Lab Visit on 09/22/2023   Component Date Value Ref Range Status    Hemoglobin A1C 09/22/2023 6.7 (H)  4.0 - 5.6 % Final    Comment: ADA Screening Guidelines:  5.7-6.4%  Consistent with prediabetes  >or=6.5%  Consistent with diabetes    High levels of fetal hemoglobin interfere with the HbA1C  assay. Heterozygous hemoglobin variants (HbS, HgC, etc)do  not significantly interfere with this assay.   However, presence of multiple variants may affect accuracy.      Estimated Avg Glucose 09/22/2023 146 (H)  68 - 131 mg/dL Final    Sodium 09/22/2023 139  136 - 145 mmol/L Final    Potassium 09/22/2023 3.4 (L)  3.5 - 5.1 mmol/L Final    Chloride 09/22/2023 100  95 - 110 mmol/L Final    CO2 09/22/2023 25  23 - 29 mmol/L Final    Glucose 09/22/2023 154 (H)  70 - 110 mg/dL Final    BUN 09/22/2023 17  6 - 20 mg/dL Final    Creatinine 09/22/2023 1.3  0.5 - 1.4 mg/dL Final    Calcium 09/22/2023 9.3  8.7 - 10.5 mg/dL Final    Total Protein 09/22/2023  7.4  6.0 - 8.4 g/dL Final    Albumin 09/22/2023 3.6  3.5 - 5.2 g/dL Final    Total Bilirubin 09/22/2023 0.4  0.1 - 1.0 mg/dL Final    Comment: For infants and newborns, interpretation of results should be based  on gestational age, weight and in agreement with clinical  observations.    Premature Infant recommended reference ranges:  Up to 24 hours.............<8.0 mg/dL  Up to 48 hours............<12.0 mg/dL  3-5 days..................<15.0 mg/dL  6-29 days.................<15.0 mg/dL      Alkaline Phosphatase 09/22/2023 94  55 - 135 U/L Final    AST 09/22/2023 18  10 - 40 U/L Final    ALT 09/22/2023 18  10 - 44 U/L Final    eGFR 09/22/2023 47.7 (A)  >60 mL/min/1.73 m^2 Final    Anion Gap 09/22/2023 14  8 - 16 mmol/L Final    Cholesterol 09/22/2023 196  120 - 199 mg/dL Final    Comment: The National Cholesterol Education Program (NCEP) has set the  following guidelines (reference ranges) for Cholesterol:  Optimal.....................<200 mg/dL  Borderline High.............200-239 mg/dL  High........................> or = 240 mg/dL      Triglycerides 09/22/2023 303 (H)  30 - 150 mg/dL Final    Comment: The National Cholesterol Education Program (NCEP) has set the  following guidelines (reference values) for triglycerides:  Normal......................<150 mg/dL  Borderline High.............150-199 mg/dL  High........................200-499 mg/dL      HDL 09/22/2023 45  40 - 75 mg/dL Final    Comment: The National Cholesterol Education Program (NCEP) has set the  following guidelines (reference values) for HDL Cholesterol:  Low...............<40 mg/dL  Optimal...........>60 mg/dL      LDL Cholesterol 09/22/2023 90.4  63.0 - 159.0 mg/dL Final    Comment: The National Cholesterol Education Program (NCEP) has set the  following guidelines (reference values) for LDL Cholesterol:  Optimal.......................<130 mg/dL  Borderline High...............130-159 mg/dL  High..........................160-189 mg/dL  Very  High.....................>190 mg/dL      HDL/Cholesterol Ratio 09/22/2023 23.0  20.0 - 50.0 % Final    Total Cholesterol/HDL Ratio 09/22/2023 4.4  2.0 - 5.0 Final    Non-HDL Cholesterol 09/22/2023 151  mg/dL Final    Comment: Risk category and Non-HDL cholesterol goals:  Coronary heart disease (CHD)or equivalent (10-year risk of CHD >20%):  Non-HDL cholesterol goal     <130 mg/dL  Two or more CHD risk factors and 10-year risk of CHD <= 20%:  Non-HDL cholesterol goal     <160 mg/dL  0 to 1 CHD risk factor:  Non-HDL cholesterol goal     <190 mg/dL         Assessment & Plan:      Hodan was seen today for follow-up.  Influenza vaccine will be administered today.  A screening mammogram will be obtained after 12/08/2023.    Current medications will be continued.  Fasting blood tests will be obtained in 4 months.  The patient has been encouraged to increase her exercise level and to lose weight.    Diagnoses and all orders for this visit:    Type 2 diabetes mellitus without complication, without long-term current use of insulin  -     Comprehensive Metabolic Panel; Future  -     Lipid Panel; Future  -     CBC Auto Differential; Future  -     TSH; Future  -     Hemoglobin A1C; Future    Essential hypertension  -     Comprehensive Metabolic Panel; Future  -     Lipid Panel; Future  -     CBC Auto Differential; Future  -     TSH; Future  -     Hemoglobin A1C; Future    Class 2 severe obesity with serious comorbidity and body mass index (BMI) of 39.0 to 39.9 in adult, unspecified obesity type  -     Comprehensive Metabolic Panel; Future  -     Lipid Panel; Future  -     CBC Auto Differential; Future  -     TSH; Future  -     Hemoglobin A1C; Future    Other hyperlipidemia  -     Comprehensive Metabolic Panel; Future  -     Lipid Panel; Future  -     CBC Auto Differential; Future  -     TSH; Future  -     Hemoglobin A1C; Future    Chronic rhinitis    Breast cancer screening by mammogram  -     Mammo Digital Screening Bilat w/  Collin; Future    Other orders  -     Influenza - Quadrivalent (PF)         No follow-ups on file.     Nilson Bahena MD

## 2023-12-11 RX ORDER — LEVOTHYROXINE SODIUM 112 UG/1
TABLET ORAL
Qty: 90 TABLET | Refills: 0 | Status: SHIPPED | OUTPATIENT
Start: 2023-12-11 | End: 2024-03-07

## 2023-12-11 NOTE — TELEPHONE ENCOUNTER
No care due was identified.  [facility] Embedded Care Due Messages. Reference number: [id number].   12/11/2023 8:58:23 AM CST

## 2023-12-11 NOTE — TELEPHONE ENCOUNTER
Refill Routing Note   Medication(s) are not appropriate for processing by [facility] for the following reason(s):        Required labs outdated    [facility] action(s):  Defer               Appointments  past 12m or future 3m with PCP    Date Provider   Last Visit   9/28/2023 Nilson Bahena MD   Next Visit   2/22/2024 Nilson Bahena MD   ED visits in past 90 days: 0        Note composed:4:29 PM 12/11/2023

## 2023-12-18 NOTE — TELEPHONE ENCOUNTER
No care due was identified.  [facility] Embedded Care Due Messages. Reference number: [id number].   12/18/2023 4:26:08 PM CST

## 2023-12-19 RX ORDER — METFORMIN HYDROCHLORIDE 500 MG/1
TABLET ORAL
Qty: 180 TABLET | Refills: 1 | Status: SHIPPED | OUTPATIENT
Start: 2023-12-19

## 2023-12-19 NOTE — TELEPHONE ENCOUNTER
Refill Decision Note   Hodan Hair  is requesting a refill authorization.  Brief Assessment and Rationale for Refill:  Approve     Medication Therapy Plan:         Comments:     Note composed:1:33 PM 12/19/2023

## 2023-12-22 ENCOUNTER — HOSPITAL ENCOUNTER (OUTPATIENT)
Dept: RADIOLOGY | Facility: HOSPITAL | Age: 58
Discharge: HOME | End: 2023-12-22
Attending: INTERNAL MEDICINE
Payer: COMMERCIAL

## 2023-12-22 VITALS — BODY MASS INDEX: 39.99 KG/M2 | WEIGHT: 240 LBS | HEIGHT: 65 IN

## 2023-12-22 DIAGNOSIS — Z12.31 BREAST CANCER SCREENING BY MAMMOGRAM: ICD-10-CM

## 2023-12-22 PROCEDURE — 77067 MAMMO DIGITAL SCREENING BILAT WITH TOMO: ICD-10-PCS | Mod: 26,,, | Performed by: RADIOLOGY

## 2023-12-22 PROCEDURE — 77067 SCR MAMMO BI INCL CAD: CPT | Mod: 26,,, | Performed by: RADIOLOGY

## 2023-12-22 PROCEDURE — 77063 BREAST TOMOSYNTHESIS BI: CPT | Mod: 26,,, | Performed by: RADIOLOGY

## 2023-12-22 PROCEDURE — 77063 MAMMO DIGITAL SCREENING BILAT WITH TOMO: ICD-10-PCS | Mod: 26,,, | Performed by: RADIOLOGY

## 2023-12-22 PROCEDURE — 77067 SCR MAMMO BI INCL CAD: CPT | Mod: TC

## 2023-12-26 NOTE — PROGRESS NOTES
GYNECOLOGY OFFICE NOTE    Reason for visit: annual    HPI: Pt is a 57 y.o.  female  who presents for annual. Menarche: 12. Pt with hx of endometrial ablation in 2016. No issues with pelvic pain or bleeding.  She is not currently sexually active. She does not desire STI screening. She denies vaginal discharge.  Last pap: 2020, denies hx of abnormal. Last MMG 2021.       Past Medical History:   Diagnosis Date    ADHD (attention deficit hyperactivity disorder)     Anxiety disorder     Hyperlipidemia     Hypertension     Hypothyroid        Past Surgical History:   Procedure Laterality Date    BREAST BIOPSY Right 2020    Rare breast ductular elements with adipose    BREAST BIOPSY Right 06/10/2020    Chronically inflamed fibroadipose tissue with rare breast ducts and abundant      SECTION      CHOLECYSTECTOMY      ENDOMETRIAL ABLATION      TRIGGER FINGER RELEASE      WISDOM TOOTH EXTRACTION         Family History   Problem Relation Age of Onset    Diabetes Maternal Grandfather     Breast cancer Mother     Colon cancer Neg Hx     Ovarian cancer Neg Hx        Social History     Tobacco Use    Smoking status: Former     Packs/day: 1.00     Years: 16.00     Pack years: 16.00     Types: Cigarettes     Quit date: 1997     Years since quittin.9    Smokeless tobacco: Never   Substance Use Topics    Alcohol use: Yes     Comment: very rarely    Drug use: No       OB History    Para Term  AB Living   2 2 2     2   SAB IAB Ectopic Multiple Live Births           2      # Outcome Date GA Lbr Yuniel/2nd Weight Sex Delivery Anes PTL Lv   2 Term     M CS-Unspec   JOSE   1 Term     F CS-Unspec   JOSE       Current Outpatient Medications   Medication Sig    amLODIPine (NORVASC) 5 MG tablet TAKE one tablet BY MOUTH EVERY EVENING    blood sugar diagnostic Strp 1 each by Misc.(Non-Drug; Combo Route) route once daily.    blood-glucose meter kit Use as instructed    carvediloL (COREG) 25 MG  Well-controlled on lisinopril with hydrochlorothiazide.  Continue same.  We will continue to monitor.   tablet TAKE ONE TABLET BY MOUTH TWICE DAILY WITH meals    cloNIDine (CATAPRES) 0.1 MG tablet TAKE ONE TABLET BY MOUTH EVERY EVENING    dextroamphetamine-amphetamine (ADDERALL) 20 mg tablet As instructed. Take 1 in afternoon as need    EScitalopram oxalate (LEXAPRO) 20 MG tablet Take 20 mg by mouth. 1 Tablet Oral Every day    ezetimibe-simvastatin 10-10 mg (VYTORIN) 10-10 mg per tablet TAKE ONE TABLET BY MOUTH EVERY EVENING    hydroCHLOROthiazide (HYDRODIURIL) 12.5 MG Tab TAKE ONE TABLET BY MOUTH EVERY DAY    lancets Misc 1 each by Misc.(Non-Drug; Combo Route) route once daily.    levothyroxine (SYNTHROID) 112 MCG tablet TAKE ONE TABLET BY MOUTH EVERY DAY    pantoprazole (PROTONIX) 40 MG tablet TAKE one tablet BY MOUTH EVERY DAY    tiZANidine 4 mg Cap Take 1 capsule by mouth every evening.    vit C,L-Ae-uzoyn-lutein-zeaxan (PRESERVISION AREDS 2) 250-90-40-1 mg Cap     VYVANSE 60 mg capsule Take 60 mg by mouth every morning.    metFORMIN (GLUCOPHAGE) 500 MG tablet Take 1 tablet (500 mg total) by mouth 2 (two) times daily with meals.     No current facility-administered medications for this visit.       Allergies: No known drug allergies     /76   Wt 110.8 kg (244 lb 3.2 oz)   BMI 40.64 kg/m²     ROS:  GENERAL: Denies fever or chills.   SKIN: Denies rash or lesions.   HEAD: Denies head injury or headache.   CHEST: Denies chest pain or shortness of breath.   CARDIOVASCULAR: Denies palpitations or chest pain.   ABDOMEN: No constipation, diarrhea, nausea, vomiting or rectal bleeding.   URINARY: No dysuria, hematuria, or burning on urination.  REPRODUCTIVE: See HPI.   BREASTS: see HPI  NEUROLOGIC: Denies syncope or weakness.     Physical Exam:  GENERAL: alert, appears stated age and cooperative  NEUROLOGIC: orientated to person, place and time, normal mood and affect   CHEST: Normal respiratory effort  NECK: normal appearance  SKIN: no acne, hirsutism  BREAST EXAM: breasts appear normal, no suspicious masses, no  skin or nipple changes or axillary nodes  ABDOMEN: abdomen is soft without significant tenderness, masses  EXTERNAL GENITALIA:  normal general appearance  URETHRA: normal urethra, normal urethral meatus  VAGINA:  normal mucosa, no  lesions  CERVIX:  Normal  UTERUS:  mobile, non tender  ADNEXA: nontender    Diagnosis:  1. Well woman exam with routine gynecological exam    2. Screening for cervical cancer    3. Encounter for screening mammogram for breast cancer        Plan:   1. Annual  2. Pap/hpv today  3. MMG ordered    Orders Placed This Encounter    HPV High Risk Genotypes, PCR    Mammo Digital Screening Bilat w/ Collin    Liquid-Based Pap Smear, Screening         Gail Null MD  OB/GYN

## 2024-01-20 ENCOUNTER — PATIENT MESSAGE (OUTPATIENT)
Dept: ADMINISTRATIVE | Facility: HOSPITAL | Age: 59
End: 2024-01-20
Payer: COMMERCIAL

## 2024-01-24 DIAGNOSIS — E11.9 TYPE 2 DIABETES MELLITUS WITHOUT COMPLICATION: ICD-10-CM

## 2024-01-25 ENCOUNTER — PATIENT OUTREACH (OUTPATIENT)
Dept: ADMINISTRATIVE | Facility: HOSPITAL | Age: 59
End: 2024-01-25
Payer: COMMERCIAL

## 2024-01-25 ENCOUNTER — OFFICE VISIT (OUTPATIENT)
Dept: OBSTETRICS AND GYNECOLOGY | Facility: CLINIC | Age: 59
End: 2024-01-25
Attending: OBSTETRICS & GYNECOLOGY
Payer: COMMERCIAL

## 2024-01-25 VITALS
BODY MASS INDEX: 42.04 KG/M2 | WEIGHT: 252.31 LBS | DIASTOLIC BLOOD PRESSURE: 69 MMHG | SYSTOLIC BLOOD PRESSURE: 125 MMHG | HEIGHT: 65 IN

## 2024-01-25 DIAGNOSIS — Z01.419 ENCOUNTER FOR GYNECOLOGICAL EXAMINATION (GENERAL) (ROUTINE) WITHOUT ABNORMAL FINDINGS: Primary | ICD-10-CM

## 2024-01-25 DIAGNOSIS — Z78.0 MENOPAUSE: ICD-10-CM

## 2024-01-25 PROCEDURE — 3008F BODY MASS INDEX DOCD: CPT | Mod: CPTII,S$GLB,, | Performed by: OBSTETRICS & GYNECOLOGY

## 2024-01-25 PROCEDURE — 3078F DIAST BP <80 MM HG: CPT | Mod: CPTII,S$GLB,, | Performed by: OBSTETRICS & GYNECOLOGY

## 2024-01-25 PROCEDURE — 1159F MED LIST DOCD IN RCRD: CPT | Mod: CPTII,S$GLB,, | Performed by: OBSTETRICS & GYNECOLOGY

## 2024-01-25 PROCEDURE — 3074F SYST BP LT 130 MM HG: CPT | Mod: CPTII,S$GLB,, | Performed by: OBSTETRICS & GYNECOLOGY

## 2024-01-25 PROCEDURE — 99396 PREV VISIT EST AGE 40-64: CPT | Mod: S$GLB,,, | Performed by: OBSTETRICS & GYNECOLOGY

## 2024-01-25 PROCEDURE — 99999 PR PBB SHADOW E&M-EST. PATIENT-LVL III: CPT | Mod: PBBFAC,,, | Performed by: OBSTETRICS & GYNECOLOGY

## 2024-01-25 RX ORDER — AZELASTINE 1 MG/ML
SPRAY, METERED NASAL
COMMUNITY
Start: 2023-11-01

## 2024-01-25 RX ORDER — CEFDINIR 300 MG/1
300 CAPSULE ORAL 2 TIMES DAILY
COMMUNITY
Start: 2023-10-23

## 2024-01-25 NOTE — PROGRESS NOTES

## 2024-01-25 NOTE — PROGRESS NOTES
Subjective:       Patient ID: Hodan Hair is a 58 y.o. female.    Chief Complaint:  Annual Exam        History of Present Illness  Hodan Hair is a 58 y.o. female  who presents for new patient annual visit. Had endometrial ablation in 2016. No periods since. She did have hot flashes, etc in the past and not really anymore. Recently she noticed that she has been more emotional-- crying when she would not have cried before. Also still notices alternating ovulation pain. Discussed in detail. She would like to check hormone labs. Discussed HRT but she is not necessarily interested in that since she has never taken it. Will check labs and see where she is. Patient agrees. All questions answered.     No LMP recorded. Patient has had an ablation.   Date of Last Pap: 2022    Review of Systems  Review of Systems   Constitutional:  Negative for chills and fever.        Objective:   Physical Exam:   Constitutional: She is oriented to person, place, and time. Vital signs are normal. She appears well-developed and well-nourished. No distress.        Pulmonary/Chest: She exhibits no mass. Right breast exhibits no mass, no nipple discharge, no skin change, no tenderness, no bleeding and no swelling. Left breast exhibits no mass, no nipple discharge, no skin change, no tenderness, no bleeding and no swelling. Breasts are symmetrical.        Abdominal: Soft. Bowel sounds are normal. She exhibits no distension and no mass. There is no abdominal tenderness. There is no rebound.     Genitourinary:    Vagina and uterus normal.   There is no rash, tenderness, lesion or injury on the right labia. There is no rash, tenderness, lesion or injury on the left labia. Cervix is normal. Right adnexum displays no mass, no tenderness and no fullness. Left adnexum displays no mass, no tenderness and no fullness. No erythema, vaginal discharge, tenderness, rectocele, cystocele or prolapse of vaginal walls in the vagina. Cervix  exhibits no motion tenderness, no discharge and no friability. Uterus is not deviated, not enlarged, not fixed, not tender and not hosting fibroids.           Musculoskeletal: Normal range of motion and moves all extremeties.      Lymphadenopathy:        Right: No supraclavicular adenopathy present.        Left: No supraclavicular adenopathy present.    Neurological: She is alert and oriented to person, place, and time.    Skin: Skin is warm and dry.    Psychiatric: She has a normal mood and affect. Her behavior is normal. Judgment normal.        Assessment/ Plan:     1. Encounter for gynecological examination (general) (routine) without abnormal findings        2. Menopause  Follicle Stimulating Hormone    Estradiol          No follow-ups on file.    As of April 1, 2021, the Cures Act has been passed nationally. This new law requires that all doctors progress notes, lab results, pathology reports and radiology reports be released IMMEDIATELY to the patient in the patient portal. That means that the results are released to you at the EXACT same time they are released to me. Therefore, with all of the patients that I have I am not able to reply to each patient exactly when the results come in. So there will be a delay from when you see the results to when I see them and have time to come up with a response to send you. Also I only see these results when I am on the computer at work. So if the results come in over the weekend or after 5 pm of a work day, I will not see them until the next business day. As you can tell, this is a challenge as a physician to give every patient the quick response they hope for and deserve. So please be patient! Thanks for understanding, Dr. Sauceda

## 2024-01-28 ENCOUNTER — PATIENT MESSAGE (OUTPATIENT)
Dept: INTERNAL MEDICINE | Facility: CLINIC | Age: 59
End: 2024-01-28
Payer: COMMERCIAL

## 2024-01-28 DIAGNOSIS — Z12.11 COLON CANCER SCREENING: Primary | ICD-10-CM

## 2024-02-16 ENCOUNTER — LAB VISIT (OUTPATIENT)
Dept: LAB | Facility: HOSPITAL | Age: 59
End: 2024-02-16
Attending: INTERNAL MEDICINE
Payer: COMMERCIAL

## 2024-02-16 DIAGNOSIS — E66.01 CLASS 2 SEVERE OBESITY WITH SERIOUS COMORBIDITY AND BODY MASS INDEX (BMI) OF 39.0 TO 39.9 IN ADULT, UNSPECIFIED OBESITY TYPE: ICD-10-CM

## 2024-02-16 DIAGNOSIS — Z78.0 MENOPAUSE: ICD-10-CM

## 2024-02-16 DIAGNOSIS — E11.9 TYPE 2 DIABETES MELLITUS WITHOUT COMPLICATION: ICD-10-CM

## 2024-02-16 DIAGNOSIS — I10 ESSENTIAL HYPERTENSION: ICD-10-CM

## 2024-02-16 DIAGNOSIS — E11.9 TYPE 2 DIABETES MELLITUS WITHOUT COMPLICATION, WITHOUT LONG-TERM CURRENT USE OF INSULIN: ICD-10-CM

## 2024-02-16 DIAGNOSIS — E78.49 OTHER HYPERLIPIDEMIA: ICD-10-CM

## 2024-02-16 LAB
ALBUMIN SERPL BCP-MCNC: 3.6 G/DL (ref 3.5–5.2)
ALBUMIN/CREAT UR: 5.9 UG/MG (ref 0–30)
ALP SERPL-CCNC: 94 U/L (ref 55–135)
ALT SERPL W/O P-5'-P-CCNC: 23 U/L (ref 10–44)
ANION GAP SERPL CALC-SCNC: 12 MMOL/L (ref 8–16)
ANISOCYTOSIS BLD QL SMEAR: SLIGHT
AST SERPL-CCNC: 19 U/L (ref 10–40)
BASO STIPL BLD QL SMEAR: ABNORMAL
BASOPHILS # BLD AUTO: 0.03 K/UL (ref 0–0.2)
BASOPHILS NFR BLD: 0.4 % (ref 0–1.9)
BILIRUB SERPL-MCNC: 0.3 MG/DL (ref 0.1–1)
BUN SERPL-MCNC: 19 MG/DL (ref 6–20)
CALCIUM SERPL-MCNC: 9.5 MG/DL (ref 8.7–10.5)
CHLORIDE SERPL-SCNC: 103 MMOL/L (ref 95–110)
CHOLEST SERPL-MCNC: 199 MG/DL (ref 120–199)
CHOLEST/HDLC SERPL: 4.1 {RATIO} (ref 2–5)
CO2 SERPL-SCNC: 25 MMOL/L (ref 23–29)
CREAT SERPL-MCNC: 1.2 MG/DL (ref 0.5–1.4)
CREAT UR-MCNC: 388 MG/DL (ref 15–325)
DIFFERENTIAL METHOD BLD: ABNORMAL
DOHLE BOD BLD QL SMEAR: PRESENT
EOSINOPHIL # BLD AUTO: 0.5 K/UL (ref 0–0.5)
EOSINOPHIL NFR BLD: 5.5 % (ref 0–8)
ERYTHROCYTE [DISTWIDTH] IN BLOOD BY AUTOMATED COUNT: 13.2 % (ref 11.5–14.5)
EST. GFR  (NO RACE VARIABLE): 52.5 ML/MIN/1.73 M^2
ESTIMATED AVG GLUCOSE: 166 MG/DL (ref 68–131)
ESTRADIOL SERPL-MCNC: <10 PG/ML
FSH SERPL-ACNC: 46.09 MIU/ML
GIANT PLATELETS BLD QL SMEAR: PRESENT
GLUCOSE SERPL-MCNC: 189 MG/DL (ref 70–110)
HBA1C MFR BLD: 7.4 % (ref 4–5.6)
HCT VFR BLD AUTO: 36.1 % (ref 37–48.5)
HDLC SERPL-MCNC: 48 MG/DL (ref 40–75)
HDLC SERPL: 24.1 % (ref 20–50)
HGB BLD-MCNC: 11.4 G/DL (ref 12–16)
IMM GRANULOCYTES # BLD AUTO: 0.02 K/UL (ref 0–0.04)
IMM GRANULOCYTES NFR BLD AUTO: 0.2 % (ref 0–0.5)
LDLC SERPL CALC-MCNC: 97.8 MG/DL (ref 63–159)
LYMPHOCYTES # BLD AUTO: 3 K/UL (ref 1–4.8)
LYMPHOCYTES NFR BLD: 34.9 % (ref 18–48)
MCH RBC QN AUTO: 27.2 PG (ref 27–31)
MCHC RBC AUTO-ENTMCNC: 31.6 G/DL (ref 32–36)
MCV RBC AUTO: 86 FL (ref 82–98)
MICROALBUMIN UR DL<=1MG/L-MCNC: 23 UG/ML
MONOCYTES # BLD AUTO: 0.6 K/UL (ref 0.3–1)
MONOCYTES NFR BLD: 6.7 % (ref 4–15)
NEUTROPHILS # BLD AUTO: 4.4 K/UL (ref 1.8–7.7)
NEUTROPHILS NFR BLD: 52.3 % (ref 38–73)
NONHDLC SERPL-MCNC: 151 MG/DL
NRBC BLD-RTO: 0 /100 WBC
OVALOCYTES BLD QL SMEAR: ABNORMAL
PLATELET # BLD AUTO: 370 K/UL (ref 150–450)
PLATELET BLD QL SMEAR: ABNORMAL
PMV BLD AUTO: 8.8 FL (ref 9.2–12.9)
POIKILOCYTOSIS BLD QL SMEAR: SLIGHT
POLYCHROMASIA BLD QL SMEAR: ABNORMAL
POTASSIUM SERPL-SCNC: 3.9 MMOL/L (ref 3.5–5.1)
PROT SERPL-MCNC: 7.4 G/DL (ref 6–8.4)
RBC # BLD AUTO: 4.19 M/UL (ref 4–5.4)
SODIUM SERPL-SCNC: 140 MMOL/L (ref 136–145)
TOXIC GRANULES BLD QL SMEAR: PRESENT
TRIGL SERPL-MCNC: 266 MG/DL (ref 30–150)
TSH SERPL DL<=0.005 MIU/L-ACNC: 1.61 UIU/ML (ref 0.4–4)
WBC # BLD AUTO: 8.47 K/UL (ref 3.9–12.7)
WBC TOXIC VACUOLES BLD QL SMEAR: PRESENT

## 2024-02-16 PROCEDURE — 80053 COMPREHEN METABOLIC PANEL: CPT | Performed by: INTERNAL MEDICINE

## 2024-02-16 PROCEDURE — 80061 LIPID PANEL: CPT | Performed by: INTERNAL MEDICINE

## 2024-02-16 PROCEDURE — 83036 HEMOGLOBIN GLYCOSYLATED A1C: CPT | Performed by: INTERNAL MEDICINE

## 2024-02-16 PROCEDURE — 82670 ASSAY OF TOTAL ESTRADIOL: CPT | Performed by: OBSTETRICS & GYNECOLOGY

## 2024-02-16 PROCEDURE — 85025 COMPLETE CBC W/AUTO DIFF WBC: CPT | Performed by: INTERNAL MEDICINE

## 2024-02-16 PROCEDURE — 36415 COLL VENOUS BLD VENIPUNCTURE: CPT | Mod: PO | Performed by: OBSTETRICS & GYNECOLOGY

## 2024-02-16 PROCEDURE — 84443 ASSAY THYROID STIM HORMONE: CPT | Performed by: INTERNAL MEDICINE

## 2024-02-16 PROCEDURE — 82043 UR ALBUMIN QUANTITATIVE: CPT | Performed by: INTERNAL MEDICINE

## 2024-02-16 PROCEDURE — 83001 ASSAY OF GONADOTROPIN (FSH): CPT | Performed by: OBSTETRICS & GYNECOLOGY

## 2024-02-22 ENCOUNTER — OFFICE VISIT (OUTPATIENT)
Dept: INTERNAL MEDICINE | Facility: CLINIC | Age: 59
End: 2024-02-22
Payer: COMMERCIAL

## 2024-02-22 VITALS
SYSTOLIC BLOOD PRESSURE: 130 MMHG | OXYGEN SATURATION: 96 % | HEIGHT: 65 IN | TEMPERATURE: 98 F | BODY MASS INDEX: 41.51 KG/M2 | WEIGHT: 249.13 LBS | RESPIRATION RATE: 20 BRPM | DIASTOLIC BLOOD PRESSURE: 76 MMHG | HEART RATE: 81 BPM

## 2024-02-22 DIAGNOSIS — I10 ESSENTIAL HYPERTENSION: ICD-10-CM

## 2024-02-22 DIAGNOSIS — N18.31 STAGE 3A CHRONIC KIDNEY DISEASE: ICD-10-CM

## 2024-02-22 DIAGNOSIS — E78.49 OTHER HYPERLIPIDEMIA: ICD-10-CM

## 2024-02-22 DIAGNOSIS — E66.01 CLASS 2 SEVERE OBESITY WITH SERIOUS COMORBIDITY AND BODY MASS INDEX (BMI) OF 39.0 TO 39.9 IN ADULT, UNSPECIFIED OBESITY TYPE: ICD-10-CM

## 2024-02-22 DIAGNOSIS — E11.9 TYPE 2 DIABETES MELLITUS WITHOUT COMPLICATION, WITHOUT LONG-TERM CURRENT USE OF INSULIN: Primary | ICD-10-CM

## 2024-02-22 PROCEDURE — 3078F DIAST BP <80 MM HG: CPT | Mod: CPTII,S$GLB,, | Performed by: INTERNAL MEDICINE

## 2024-02-22 PROCEDURE — 1160F RVW MEDS BY RX/DR IN RCRD: CPT | Mod: CPTII,S$GLB,, | Performed by: INTERNAL MEDICINE

## 2024-02-22 PROCEDURE — 3051F HG A1C>EQUAL 7.0%<8.0%: CPT | Mod: CPTII,S$GLB,, | Performed by: INTERNAL MEDICINE

## 2024-02-22 PROCEDURE — 3066F NEPHROPATHY DOC TX: CPT | Mod: CPTII,S$GLB,, | Performed by: INTERNAL MEDICINE

## 2024-02-22 PROCEDURE — 3061F NEG MICROALBUMINURIA REV: CPT | Mod: CPTII,S$GLB,, | Performed by: INTERNAL MEDICINE

## 2024-02-22 PROCEDURE — 3075F SYST BP GE 130 - 139MM HG: CPT | Mod: CPTII,S$GLB,, | Performed by: INTERNAL MEDICINE

## 2024-02-22 PROCEDURE — 99999 PR PBB SHADOW E&M-EST. PATIENT-LVL V: CPT | Mod: PBBFAC,,, | Performed by: INTERNAL MEDICINE

## 2024-02-22 PROCEDURE — 3008F BODY MASS INDEX DOCD: CPT | Mod: CPTII,S$GLB,, | Performed by: INTERNAL MEDICINE

## 2024-02-22 PROCEDURE — 1159F MED LIST DOCD IN RCRD: CPT | Mod: CPTII,S$GLB,, | Performed by: INTERNAL MEDICINE

## 2024-02-22 PROCEDURE — 99214 OFFICE O/P EST MOD 30 MIN: CPT | Mod: S$GLB,,, | Performed by: INTERNAL MEDICINE

## 2024-02-22 RX ORDER — TIRZEPATIDE 2.5 MG/.5ML
2.5 INJECTION, SOLUTION SUBCUTANEOUS
Qty: 4 PEN | Refills: 1 | Status: SHIPPED | OUTPATIENT
Start: 2024-02-22

## 2024-03-07 RX ORDER — LEVOTHYROXINE SODIUM 112 UG/1
TABLET ORAL
Qty: 90 TABLET | Refills: 3 | Status: SHIPPED | OUTPATIENT
Start: 2024-03-07

## 2024-03-07 NOTE — TELEPHONE ENCOUNTER
Refill Decision Note   Hodan Hair  is requesting a refill authorization.  Brief Assessment and Rationale for Refill:  Approve     Medication Therapy Plan:         Comments:     Note composed:12:37 PM 03/07/2024

## 2024-03-07 NOTE — TELEPHONE ENCOUNTER
No care due was identified.  Health [facility] Embedded Care Due Messages. Reference number: [id number].   3/07/2024 8:47:20 AM CST

## 2024-04-09 NOTE — PROGRESS NOTES
Subjective:       Patient ID: Hodan Hair is a 58 y.o. female.    Chief Complaint: Follow-up    HPI  The patient presents for follow-up of medical conditions which include type 2 diabetes mellitus, hypertension, hyperlipidemia, chronic kidney disease, and obesity.  The patient has noted unspecified weight gain.  She admits to decreased dietary compliance.  She has been under more stress lately.  Her vision is stable although she does complain of dry eyes.  No lower extremity paresthesias are present.  She does not regularly monitor blood sugar levels.  No hypoglycemic symptoms have been noted.  She has hypertension and is tolerating her medication well.  She does not monitor blood pressures at home.    Review of Systems   Constitutional:  Positive for appetite change and unexpected weight change. Negative for activity change.   HENT:  Positive for nasal congestion and postnasal drip.    Eyes:  Negative for visual disturbance.   Respiratory:  Negative for shortness of breath.    Cardiovascular:  Negative for chest pain, palpitations and leg swelling.   Gastrointestinal:  Negative for abdominal pain, blood in stool and diarrhea.   Genitourinary:  Negative for dysuria, frequency, hematuria and urgency.   Neurological:  Negative for weakness, numbness and headaches.   Psychiatric/Behavioral:  Negative for sleep disturbance.             Physical Exam  Vitals and nursing note reviewed.   Constitutional:       General: She is not in acute distress.     Appearance: Normal appearance. She is well-developed.      Comments: The patient has gained 9 lb since 09/28/2023.   HENT:      Head: Normocephalic and atraumatic.   Eyes:      General: No scleral icterus.     Extraocular Movements: Extraocular movements intact.      Conjunctiva/sclera: Conjunctivae normal.   Neck:      Thyroid: No thyromegaly.      Vascular: No JVD.   Cardiovascular:      Rate and Rhythm: Normal rate and regular rhythm.      Heart sounds: Normal heart  sounds. No murmur heard.     No friction rub. No gallop.   Pulmonary:      Effort: Pulmonary effort is normal. No respiratory distress.      Breath sounds: Normal breath sounds. No wheezing or rales.   Abdominal:      General: Bowel sounds are normal.      Palpations: Abdomen is soft. There is no mass.      Tenderness: There is no abdominal tenderness.   Musculoskeletal:         General: No tenderness. Normal range of motion.      Cervical back: Normal range of motion and neck supple.   Lymphadenopathy:      Cervical: No cervical adenopathy.   Skin:     General: Skin is warm and dry.      Findings: No rash.      Comments: No foot lesions are present.   Neurological:      Mental Status: She is alert and oriented to person, place, and time.      Cranial Nerves: No cranial nerve deficit.      Comments: Sensory exam is intact in both feet on monofilament testing.   Psychiatric:         Mood and Affect: Mood normal.         Behavior: Behavior normal.         Protective Sensation (w/ 10 gram monofilament):  Right: Intact  Left: Intact    Visual Inspection:  Normal -  Bilateral    Pedal Pulses:   Right: Present  Left: Present    Posterior Tibialis Pulses:   Right:Present  Left: Present    Lab Visit on 02/16/2024   Component Date Value Ref Range Status    Sodium 02/16/2024 140  136 - 145 mmol/L Final    Potassium 02/16/2024 3.9  3.5 - 5.1 mmol/L Final    Chloride 02/16/2024 103  95 - 110 mmol/L Final    CO2 02/16/2024 25  23 - 29 mmol/L Final    Glucose 02/16/2024 189 (H)  70 - 110 mg/dL Final    BUN 02/16/2024 19  6 - 20 mg/dL Final    Creatinine 02/16/2024 1.2  0.5 - 1.4 mg/dL Final    Calcium 02/16/2024 9.5  8.7 - 10.5 mg/dL Final    Total Protein 02/16/2024 7.4  6.0 - 8.4 g/dL Final    Albumin 02/16/2024 3.6  3.5 - 5.2 g/dL Final    Total Bilirubin 02/16/2024 0.3  0.1 - 1.0 mg/dL Final    Comment: For infants and newborns, interpretation of results should be based  on gestational age, weight and in agreement with  clinical  observations.    Premature Infant recommended reference ranges:  Up to 24 hours.............<8.0 mg/dL  Up to 48 hours............<12.0 mg/dL  3-5 days..................<15.0 mg/dL  6-29 days.................<15.0 mg/dL      Alkaline Phosphatase 02/16/2024 94  55 - 135 U/L Final    AST 02/16/2024 19  10 - 40 U/L Final    ALT 02/16/2024 23  10 - 44 U/L Final    eGFR 02/16/2024 52.5 (A)  >60 mL/min/1.73 m^2 Final    Anion Gap 02/16/2024 12  8 - 16 mmol/L Final    Cholesterol 02/16/2024 199  120 - 199 mg/dL Final    Comment: The National Cholesterol Education Program (NCEP) has set the  following guidelines (reference ranges) for Cholesterol:  Optimal.....................<200 mg/dL  Borderline High.............200-239 mg/dL  High........................> or = 240 mg/dL      Triglycerides 02/16/2024 266 (H)  30 - 150 mg/dL Final    Comment: The National Cholesterol Education Program (NCEP) has set the  following guidelines (reference values) for triglycerides:  Normal......................<150 mg/dL  Borderline High.............150-199 mg/dL  High........................200-499 mg/dL      HDL 02/16/2024 48  40 - 75 mg/dL Final    Comment: The National Cholesterol Education Program (NCEP) has set the  following guidelines (reference values) for HDL Cholesterol:  Low...............<40 mg/dL  Optimal...........>60 mg/dL      LDL Cholesterol 02/16/2024 97.8  63.0 - 159.0 mg/dL Final    Comment: The National Cholesterol Education Program (NCEP) has set the  following guidelines (reference values) for LDL Cholesterol:  Optimal.......................<130 mg/dL  Borderline High...............130-159 mg/dL  High..........................160-189 mg/dL  Very High.....................>190 mg/dL      HDL/Cholesterol Ratio 02/16/2024 24.1  20.0 - 50.0 % Final    Total Cholesterol/HDL Ratio 02/16/2024 4.1  2.0 - 5.0 Final    Non-HDL Cholesterol 02/16/2024 151  mg/dL Final    Comment: Risk category and Non-HDL cholesterol  goals:  Coronary heart disease (CHD)or equivalent (10-year risk of CHD >20%):  Non-HDL cholesterol goal     <130 mg/dL  Two or more CHD risk factors and 10-year risk of CHD <= 20%:  Non-HDL cholesterol goal     <160 mg/dL  0 to 1 CHD risk factor:  Non-HDL cholesterol goal     <190 mg/dL      WBC 02/16/2024 8.47  3.90 - 12.70 K/uL Final    RBC 02/16/2024 4.19  4.00 - 5.40 M/uL Final    Hemoglobin 02/16/2024 11.4 (L)  12.0 - 16.0 g/dL Final    Hematocrit 02/16/2024 36.1 (L)  37.0 - 48.5 % Final    MCV 02/16/2024 86  82 - 98 fL Final    MCH 02/16/2024 27.2  27.0 - 31.0 pg Final    MCHC 02/16/2024 31.6 (L)  32.0 - 36.0 g/dL Final    RDW 02/16/2024 13.2  11.5 - 14.5 % Final    Platelets 02/16/2024 370  150 - 450 K/uL Final    MPV 02/16/2024 8.8 (L)  9.2 - 12.9 fL Final    Immature Granulocytes 02/16/2024 0.2  0.0 - 0.5 % Final    Gran # (ANC) 02/16/2024 4.4  1.8 - 7.7 K/uL Final    Immature Grans (Abs) 02/16/2024 0.02  0.00 - 0.04 K/uL Final    Comment: Mild elevation in immature granulocytes is non specific and   can be seen in a variety of conditions including stress response,   acute inflammation, trauma and pregnancy. Correlation with other   laboratory and clinical findings is essential.      Lymph # 02/16/2024 3.0  1.0 - 4.8 K/uL Final    Mono # 02/16/2024 0.6  0.3 - 1.0 K/uL Final    Eos # 02/16/2024 0.5  0.0 - 0.5 K/uL Final    Baso # 02/16/2024 0.03  0.00 - 0.20 K/uL Final    nRBC 02/16/2024 0  0 /100 WBC Final    Gran % 02/16/2024 52.3  38.0 - 73.0 % Final    Lymph % 02/16/2024 34.9  18.0 - 48.0 % Final    Mono % 02/16/2024 6.7  4.0 - 15.0 % Final    Eosinophil % 02/16/2024 5.5  0.0 - 8.0 % Final    Basophil % 02/16/2024 0.4  0.0 - 1.9 % Final    Platelet Estimate 02/16/2024 Appears normal   Final    Aniso 02/16/2024 Slight   Final    Poik 02/16/2024 Slight   Final    Poly 02/16/2024 Occasional   Final    Ovalocytes 02/16/2024 Occasional   Final    Basophilic Stippling 02/16/2024 Occasional   Final    Dohle  Bodies 02/16/2024 Present   Final    Toxic Granulation 02/16/2024 Present   Final    Large/Giant Platelets 02/16/2024 Present   Final    Vacuolated Granulocytes 02/16/2024 Present   Final    Differential Method 02/16/2024 Automated   Final    TSH 02/16/2024 1.612  0.400 - 4.000 uIU/mL Final    Hemoglobin A1C 02/16/2024 7.4 (H)  4.0 - 5.6 % Final    Comment: ADA Screening Guidelines:  5.7-6.4%  Consistent with prediabetes  >or=6.5%  Consistent with diabetes    High levels of fetal hemoglobin interfere with the HbA1C  assay. Heterozygous hemoglobin variants (HbS, HgC, etc)do  not significantly interfere with this assay.   However, presence of multiple variants may affect accuracy.      Estimated Avg Glucose 02/16/2024 166 (H)  68 - 131 mg/dL Final    Microalbumin, Urine 02/16/2024 23.0  ug/mL Final    Creatinine, Urine 02/16/2024 388.0 (H)  15.0 - 325.0 mg/dL Final    Microalb/Creat Ratio 02/16/2024 5.9  0.0 - 30.0 ug/mg Final    Follicle Stimulating Hormone 02/16/2024 46.09  See Text mIU/mL Final    Comment: Female Reference Ranges:  Follicular Phase.................3.03-8.08 mIU/mL  Midcycle Peak....................2.55-16.69 mIU/mL  Luteal Phase.....................1.38-5.47 mIU/mL  Postmenopausal...................26..41 mIU/mL  Male Reference Range:............0.95-11.95 mIU/mL      Estradiol 02/16/2024 <10 (A)  See Text pg/mL Final    Comment: Estradiol Reference Ranges (Female):  Follicular phase:  pg/mL  Midcycle:          pg/mL  Luteal phase:      pg/mL  Post-menopausal(Not on HRT): <10-28 pg/mL  Post-menopausal(On HRT): < pg/mL  Males: 11-44 pg/mL    The drug Fulvestrant (Faslodex) may interfere with the   assay leading to falsely elevated Estradiol results.    Patients treated with Mifepristone should not be tested with  the  or Alinity I Estradiol assay for up to two   weeks due to the interference of the drug in this assay.         Assessment & Plan:      Hodan was  seen today for follow-up.  The patient is still due for colon cancer screening.  She prefers to use the Cologuard kit.    Mounjaro will be ordered for treatment of diabetes and to also assist with weight loss.  The patient will notify us after 3 weeks of therapy update us how she is tolerating the medication.  We discussed medication side effects such as nausea and changes in bowel habits.  We will likely increase the dose to 5 mg subQ every 7 days after 1 month of therapy if she is tolerating the medication well.  Lab studies will be repeated in 3-4 months.    Diagnoses and all orders for this visit:    Type 2 diabetes mellitus without complication, without long-term current use of insulin  -     tirzepatide (MOUNJARO) 2.5 mg/0.5 mL PnIj; Inject 2.5 mg into the skin every 7 days.  -     Comprehensive Metabolic Panel; Future  -     Lipid Panel; Future  -     Hemoglobin A1C; Future    Essential hypertension  -     Comprehensive Metabolic Panel; Future  -     Lipid Panel; Future  -     Hemoglobin A1C; Future    Other hyperlipidemia  -     Comprehensive Metabolic Panel; Future  -     Lipid Panel; Future  -     Hemoglobin A1C; Future    Stage 3a chronic kidney disease  -     Comprehensive Metabolic Panel; Future  -     Lipid Panel; Future  -     Hemoglobin A1C; Future    Class 2 severe obesity with serious comorbidity and body mass index (BMI) of 39.0 to 39.9 in adult, unspecified obesity type         Follow up in about 4 months (around 6/22/2024).     Nilson Bahena MD

## 2024-04-21 NOTE — TELEPHONE ENCOUNTER
No care due was identified.  Health [facility] Embedded Care Due Messages. Reference number: [id number].   4/21/2024 8:02:44 AM CDT

## 2024-04-22 RX ORDER — EZETIMIBE AND SIMVASTATIN 10; 10 MG/1; MG/1
TABLET ORAL
Qty: 90 TABLET | Refills: 3 | Status: SHIPPED | OUTPATIENT
Start: 2024-04-22

## 2024-04-22 RX ORDER — PANTOPRAZOLE SODIUM 40 MG/1
TABLET, DELAYED RELEASE ORAL
Qty: 90 TABLET | Refills: 3 | Status: SHIPPED | OUTPATIENT
Start: 2024-04-22

## 2024-04-22 NOTE — TELEPHONE ENCOUNTER
Refill Decision Note   Hodan Hair  is requesting a refill authorization.  Brief Assessment and Rationale for Refill:  Approve     Medication Therapy Plan:         Comments:     Note composed:2:42 PM 04/22/2024

## 2024-06-02 DIAGNOSIS — I10 HYPERTENSION, UNSPECIFIED TYPE: ICD-10-CM

## 2024-06-02 RX ORDER — CARVEDILOL 25 MG/1
TABLET ORAL
Qty: 180 TABLET | Refills: 2 | Status: SHIPPED | OUTPATIENT
Start: 2024-06-02

## 2024-06-02 RX ORDER — METFORMIN HYDROCHLORIDE 500 MG/1
TABLET ORAL
Qty: 180 TABLET | Refills: 0 | Status: SHIPPED | OUTPATIENT
Start: 2024-06-02

## 2024-06-02 NOTE — TELEPHONE ENCOUNTER
Care Due:                  Date            Visit Type   Department     Provider  --------------------------------------------------------------------------------                                EP -                              PRIMARY      [facility] INTERNAL  Last Visit: 02-      CARE ([facility])   MEDICINE       Nilsonsigifredo Bahena                              EP -                              PRIMARY      [facility] INTERNAL  Next Visit: 07-      CARE ([facility])   MEDICINE       Nilsonzabrina Bahena                                                            Last  Test          Frequency    Reason                     Performed    Due Date  --------------------------------------------------------------------------------    HBA1C.......  6 months...  metFORMIN, tirzepatide...  02-   08-    Health [facility] Embedded Care Due Messages. Reference number: [id number].   6/02/2024 8:01:50 AM CDT

## 2024-06-03 NOTE — TELEPHONE ENCOUNTER
Refill Decision Note   Hodan Hair  is requesting a refill authorization.  Brief Assessment and Rationale for Refill:  Approve     Medication Therapy Plan: FLOS      Comments:     Note composed:10:24 PM 06/02/2024

## 2024-06-04 LAB
LEFT EYE DM RETINOPATHY: NEGATIVE
RIGHT EYE DM RETINOPATHY: NEGATIVE

## 2024-06-10 ENCOUNTER — PATIENT OUTREACH (OUTPATIENT)
Dept: ADMINISTRATIVE | Facility: HOSPITAL | Age: 59
End: 2024-06-10
Payer: COMMERCIAL

## 2024-06-20 DIAGNOSIS — I10 HYPERTENSION, UNSPECIFIED TYPE: ICD-10-CM

## 2024-06-20 RX ORDER — HYDROCHLOROTHIAZIDE 12.5 MG/1
12.5 TABLET ORAL DAILY
Qty: 90 TABLET | Refills: 2 | Status: SHIPPED | OUTPATIENT
Start: 2024-06-20

## 2024-06-20 NOTE — TELEPHONE ENCOUNTER
Refill Decision Note   Hodan Hair  is requesting a refill authorization.  Brief Assessment and Rationale for Refill:  Approve     Medication Therapy Plan:         Comments:     Note composed:11:48 AM 06/20/2024

## 2024-06-20 NOTE — TELEPHONE ENCOUNTER
No care due was identified.  [facility] Embedded Care Due Messages. Reference number: [id number].   6/20/2024 8:04:48 AM CDT

## 2024-06-25 ENCOUNTER — LAB VISIT (OUTPATIENT)
Dept: LAB | Facility: HOSPITAL | Age: 59
End: 2024-06-25
Attending: INTERNAL MEDICINE
Payer: COMMERCIAL

## 2024-06-25 ENCOUNTER — PATIENT OUTREACH (OUTPATIENT)
Dept: ADMINISTRATIVE | Facility: HOSPITAL | Age: 59
End: 2024-06-25
Payer: COMMERCIAL

## 2024-06-25 DIAGNOSIS — N18.31 STAGE 3A CHRONIC KIDNEY DISEASE: ICD-10-CM

## 2024-06-25 DIAGNOSIS — I10 ESSENTIAL HYPERTENSION: ICD-10-CM

## 2024-06-25 DIAGNOSIS — E11.9 TYPE 2 DIABETES MELLITUS WITHOUT COMPLICATION, WITHOUT LONG-TERM CURRENT USE OF INSULIN: ICD-10-CM

## 2024-06-25 DIAGNOSIS — E78.49 OTHER HYPERLIPIDEMIA: ICD-10-CM

## 2024-06-25 LAB
ALBUMIN SERPL BCP-MCNC: 3.7 G/DL (ref 3.5–5.2)
ALP SERPL-CCNC: 92 U/L (ref 55–135)
ALT SERPL W/O P-5'-P-CCNC: 16 U/L (ref 10–44)
ANION GAP SERPL CALC-SCNC: 14 MMOL/L (ref 8–16)
AST SERPL-CCNC: 18 U/L (ref 10–40)
BILIRUB SERPL-MCNC: 0.4 MG/DL (ref 0.1–1)
BUN SERPL-MCNC: 13 MG/DL (ref 6–20)
CALCIUM SERPL-MCNC: 9.4 MG/DL (ref 8.7–10.5)
CHLORIDE SERPL-SCNC: 98 MMOL/L (ref 95–110)
CHOLEST SERPL-MCNC: 178 MG/DL (ref 120–199)
CHOLEST/HDLC SERPL: 3.8 {RATIO} (ref 2–5)
CO2 SERPL-SCNC: 26 MMOL/L (ref 23–29)
CREAT SERPL-MCNC: 1.4 MG/DL (ref 0.5–1.4)
EST. GFR  (NO RACE VARIABLE): 43.6 ML/MIN/1.73 M^2
ESTIMATED AVG GLUCOSE: 137 MG/DL (ref 68–131)
GLUCOSE SERPL-MCNC: 130 MG/DL (ref 70–110)
HBA1C MFR BLD: 6.4 % (ref 4–5.6)
HDLC SERPL-MCNC: 47 MG/DL (ref 40–75)
HDLC SERPL: 26.4 % (ref 20–50)
LDLC SERPL CALC-MCNC: 86 MG/DL (ref 63–159)
NONHDLC SERPL-MCNC: 131 MG/DL
POTASSIUM SERPL-SCNC: 3.6 MMOL/L (ref 3.5–5.1)
PROT SERPL-MCNC: 7.2 G/DL (ref 6–8.4)
SODIUM SERPL-SCNC: 138 MMOL/L (ref 136–145)
TRIGL SERPL-MCNC: 225 MG/DL (ref 30–150)

## 2024-06-25 PROCEDURE — 36415 COLL VENOUS BLD VENIPUNCTURE: CPT | Mod: PO | Performed by: INTERNAL MEDICINE

## 2024-06-25 PROCEDURE — 83036 HEMOGLOBIN GLYCOSYLATED A1C: CPT | Performed by: INTERNAL MEDICINE

## 2024-06-25 PROCEDURE — 80061 LIPID PANEL: CPT | Performed by: INTERNAL MEDICINE

## 2024-06-25 PROCEDURE — 80053 COMPREHEN METABOLIC PANEL: CPT | Performed by: INTERNAL MEDICINE

## 2024-06-25 NOTE — PROGRESS NOTES
Population Health Chart Review & Patient Outreach Details      Additional [facility] Health Notes:               Updates Requested / Reviewed:      Care Everywhere, , and Immunizations Reconciliation Completed or Queried: [place]         Health Maintenance Topics Overdue:      [facility] Score: 1     Colon Cancer Screening                       Health Maintenance Topic(s) Outreach Outcomes & Actions Taken:    Primary Care Appt - Outreach Outcomes & Actions Taken  : Primary Care Appt Scheduled    Lab(s) - Outreach Outcomes & Actions Taken  : Overdue Lab(s) Scheduled

## 2024-07-02 ENCOUNTER — OFFICE VISIT (OUTPATIENT)
Dept: INTERNAL MEDICINE | Facility: CLINIC | Age: 59
End: 2024-07-02
Payer: COMMERCIAL

## 2024-07-02 VITALS
TEMPERATURE: 97 F | SYSTOLIC BLOOD PRESSURE: 128 MMHG | WEIGHT: 233.94 LBS | OXYGEN SATURATION: 98 % | BODY MASS INDEX: 38.92 KG/M2 | RESPIRATION RATE: 14 BRPM | DIASTOLIC BLOOD PRESSURE: 76 MMHG | HEART RATE: 79 BPM

## 2024-07-02 DIAGNOSIS — E11.9 TYPE 2 DIABETES MELLITUS WITHOUT COMPLICATION, WITHOUT LONG-TERM CURRENT USE OF INSULIN: Primary | ICD-10-CM

## 2024-07-02 DIAGNOSIS — E78.49 OTHER HYPERLIPIDEMIA: ICD-10-CM

## 2024-07-02 DIAGNOSIS — I10 ESSENTIAL HYPERTENSION: ICD-10-CM

## 2024-07-02 DIAGNOSIS — N18.31 STAGE 3A CHRONIC KIDNEY DISEASE: ICD-10-CM

## 2024-07-02 PROCEDURE — 2023F DILAT RTA XM W/O RTNOPTHY: CPT | Mod: CPTII,S$GLB,, | Performed by: INTERNAL MEDICINE

## 2024-07-02 PROCEDURE — 99999 PR PBB SHADOW E&M-EST. PATIENT-LVL IV: CPT | Mod: PBBFAC,,, | Performed by: INTERNAL MEDICINE

## 2024-07-02 PROCEDURE — 1160F RVW MEDS BY RX/DR IN RCRD: CPT | Mod: CPTII,S$GLB,, | Performed by: INTERNAL MEDICINE

## 2024-07-02 PROCEDURE — 3061F NEG MICROALBUMINURIA REV: CPT | Mod: CPTII,S$GLB,, | Performed by: INTERNAL MEDICINE

## 2024-07-02 PROCEDURE — 3044F HG A1C LEVEL LT 7.0%: CPT | Mod: CPTII,S$GLB,, | Performed by: INTERNAL MEDICINE

## 2024-07-02 PROCEDURE — 1159F MED LIST DOCD IN RCRD: CPT | Mod: CPTII,S$GLB,, | Performed by: INTERNAL MEDICINE

## 2024-07-02 PROCEDURE — 3008F BODY MASS INDEX DOCD: CPT | Mod: CPTII,S$GLB,, | Performed by: INTERNAL MEDICINE

## 2024-07-02 PROCEDURE — 3078F DIAST BP <80 MM HG: CPT | Mod: CPTII,S$GLB,, | Performed by: INTERNAL MEDICINE

## 2024-07-02 PROCEDURE — 3074F SYST BP LT 130 MM HG: CPT | Mod: CPTII,S$GLB,, | Performed by: INTERNAL MEDICINE

## 2024-07-02 PROCEDURE — 99214 OFFICE O/P EST MOD 30 MIN: CPT | Mod: S$GLB,,, | Performed by: INTERNAL MEDICINE

## 2024-07-02 PROCEDURE — 3066F NEPHROPATHY DOC TX: CPT | Mod: CPTII,S$GLB,, | Performed by: INTERNAL MEDICINE

## 2024-07-02 RX ORDER — TIRZEPATIDE 5 MG/.5ML
5 INJECTION, SOLUTION SUBCUTANEOUS
Qty: 2 ML | Refills: 2 | Status: SHIPPED | OUTPATIENT
Start: 2024-07-02

## 2024-07-02 NOTE — PROGRESS NOTES
Subjective:       Patient ID: Hodan Hair is a 58 y.o. female.    Chief Complaint: Follow-up    HPI  The patient presents for follow-up of medical conditions which include type 2 diabetes mellitus, hypertension, hyperlipidemia, obesity, and chronic kidney disease.    The patient has type 2 diabetes mellitus.  She has not been monitoring her blood sugar levels.  She lost her glucometer.  She has noted decreased appetite with use of Mounjaro.  She has not experienced any nausea or abdominal pain.  She states her bowel movements are more regular now; no diarrhea is noted.  No hypoglycemic symptoms noted.  She also remains on metformin therapy.    She has hypertension.  She does not routinely monitor blood pressures.  She is currently using hydrochlorothiazide, amlodipine, and carvedilol.  She has hyperlipidemia which has been controlled with ezetimibe-simvastatin (Vytorin).  No muscle pain or muscle weakness is noted.    She remains on levothyroxine for management of hypothyroidism.    She states she is using Vyvanse instead of Adderall for management of ADD.  She is also on Lexapro per her psychiatrist.    Review of Systems   Constitutional:  Negative for activity change, appetite change and unexpected weight change.   Eyes:  Negative for visual disturbance.   Respiratory:  Negative for shortness of breath.    Cardiovascular:  Negative for chest pain, palpitations and leg swelling.   Gastrointestinal:  Negative for abdominal pain, blood in stool and diarrhea.   Genitourinary:  Negative for dysuria, frequency, hematuria and urgency.   Neurological:  Negative for weakness, numbness and headaches.   Psychiatric/Behavioral:  Negative for sleep disturbance.             Physical Exam  Vitals and nursing note reviewed.   Constitutional:       General: She is not in acute distress.     Appearance: Normal appearance. She is well-developed.      Comments: The patient has lost 16 lb since 02/22/2024.   HENT:      Head:  Normocephalic and atraumatic.   Eyes:      General: No scleral icterus.     Extraocular Movements: Extraocular movements intact.      Conjunctiva/sclera: Conjunctivae normal.   Neck:      Thyroid: No thyromegaly.      Vascular: No JVD.   Cardiovascular:      Rate and Rhythm: Normal rate and regular rhythm.      Heart sounds: Normal heart sounds. No murmur heard.     No friction rub. No gallop.   Pulmonary:      Effort: Pulmonary effort is normal. No respiratory distress.      Breath sounds: Normal breath sounds. No wheezing or rales.   Abdominal:      General: Bowel sounds are normal.      Palpations: Abdomen is soft. There is no mass.      Tenderness: There is no abdominal tenderness.   Musculoskeletal:         General: No tenderness. Normal range of motion.      Cervical back: Normal range of motion and neck supple.   Lymphadenopathy:      Cervical: No cervical adenopathy.   Skin:     General: Skin is warm and dry.      Findings: No rash.      Comments: No foot lesions are present.   Neurological:      Mental Status: She is alert and oriented to person, place, and time.      Cranial Nerves: No cranial nerve deficit.      Comments: Sensory exam is intact in both feet on monofilament testing.   Psychiatric:         Mood and Affect: Mood normal.         Behavior: Behavior normal.       Protective Sensation (w/ 10 gram monofilament):  Right: Intact  Left: Intact    Visual Inspection:  Normal -  Bilateral    Pedal Pulses:   Right: Present  Left: Present    Posterior Tibialis Pulses:   Right:Present  Left: Present      Lab Visit on 06/25/2024   Component Date Value Ref Range Status    Sodium 06/25/2024 138  136 - 145 mmol/L Final    Potassium 06/25/2024 3.6  3.5 - 5.1 mmol/L Final    Chloride 06/25/2024 98  95 - 110 mmol/L Final    CO2 06/25/2024 26  23 - 29 mmol/L Final    Glucose 06/25/2024 130 (H)  70 - 110 mg/dL Final    BUN 06/25/2024 13  6 - 20 mg/dL Final    Creatinine 06/25/2024 1.4  0.5 - 1.4 mg/dL Final     Calcium 06/25/2024 9.4  8.7 - 10.5 mg/dL Final    Total Protein 06/25/2024 7.2  6.0 - 8.4 g/dL Final    Albumin 06/25/2024 3.7  3.5 - 5.2 g/dL Final    Total Bilirubin 06/25/2024 0.4  0.1 - 1.0 mg/dL Final    Comment: For infants and newborns, interpretation of results should be based  on gestational age, weight and in agreement with clinical  observations.    Premature Infant recommended reference ranges:  Up to 24 hours.............<8.0 mg/dL  Up to 48 hours............<12.0 mg/dL  3-5 days..................<15.0 mg/dL  6-29 days.................<15.0 mg/dL      Alkaline Phosphatase 06/25/2024 92  55 - 135 U/L Final    AST 06/25/2024 18  10 - 40 U/L Final    ALT 06/25/2024 16  10 - 44 U/L Final    eGFR 06/25/2024 43.6 (A)  >60 mL/min/1.73 m^2 Final    Anion Gap 06/25/2024 14  8 - 16 mmol/L Final    Cholesterol 06/25/2024 178  120 - 199 mg/dL Final    Comment: The National Cholesterol Education Program (NCEP) has set the  following guidelines (reference ranges) for Cholesterol:  Optimal.....................<200 mg/dL  Borderline High.............200-239 mg/dL  High........................> or = 240 mg/dL      Triglycerides 06/25/2024 225 (H)  30 - 150 mg/dL Final    Comment: The National Cholesterol Education Program (NCEP) has set the  following guidelines (reference values) for triglycerides:  Normal......................<150 mg/dL  Borderline High.............150-199 mg/dL  High........................200-499 mg/dL      HDL 06/25/2024 47  40 - 75 mg/dL Final    Comment: The National Cholesterol Education Program (NCEP) has set the  following guidelines (reference values) for HDL Cholesterol:  Low...............<40 mg/dL  Optimal...........>60 mg/dL      LDL Cholesterol 06/25/2024 86.0  63.0 - 159.0 mg/dL Final    Comment: The National Cholesterol Education Program (NCEP) has set the  following guidelines (reference values) for LDL Cholesterol:  Optimal.......................<130 mg/dL  Borderline  High...............130-159 mg/dL  High..........................160-189 mg/dL  Very High.....................>190 mg/dL      HDL/Cholesterol Ratio 06/25/2024 26.4  20.0 - 50.0 % Final    Total Cholesterol/HDL Ratio 06/25/2024 3.8  2.0 - 5.0 Final    Non-HDL Cholesterol 06/25/2024 131  mg/dL Final    Comment: Risk category and Non-HDL cholesterol goals:  Coronary heart disease (CHD)or equivalent (10-year risk of CHD >20%):  Non-HDL cholesterol goal     <130 mg/dL  Two or more CHD risk factors and 10-year risk of CHD <= 20%:  Non-HDL cholesterol goal     <160 mg/dL  0 to 1 CHD risk factor:  Non-HDL cholesterol goal     <190 mg/dL      Hemoglobin A1C 06/25/2024 6.4 (H)  4.0 - 5.6 % Final    Comment: ADA Screening Guidelines:  5.7-6.4%  Consistent with prediabetes  >or=6.5%  Consistent with diabetes    High levels of fetal hemoglobin interfere with the HbA1C  assay. Heterozygous hemoglobin variants (HbS, HgC, etc)do  not significantly interfere with this assay.   However, presence of multiple variants may affect accuracy.      Estimated Avg Glucose 06/25/2024 137 (H)  68 - 131 mg/dL Final   Patient Outreach on 06/10/2024   Component Date Value Ref Range Status    Left Eye DM Retinopathy 06/04/2024 Negative   Final    Right Eye DM Retinopathy 06/04/2024 Negative   Final       Assessment & Plan:      Hodan was seen today for follow-up.  The patient reports she had her last eye exam on 06/04/2024 per her ophthalmologist Dr. Naz Amanda.  The ophthalmology report has been scanned into her EHR.  The dose of Mounjaro will be increased to 5 mg subQ every 7 days.    Diagnoses and all orders for this visit:    Type 2 diabetes mellitus without complication, without long-term current use of insulin  -     tirzepatide (MOUNJARO) 5 mg/0.5 mL PnIj; Inject 5 mg into the skin every 7 days.  -     Comprehensive Metabolic Panel; Future  -     Lipid Panel; Future  -     Hemoglobin A1C; Future    Essential hypertension -  hydrochlorothiazide will be discontinued for now.  The patient should increase her daily water intake as discussed.  Updated lab studies will be obtained in 4 months.  -     Comprehensive Metabolic Panel; Future  -     Lipid Panel; Future  -     Hemoglobin A1C; Future    Other hyperlipidemia  -     Comprehensive Metabolic Panel; Future  -     Lipid Panel; Future  -     Hemoglobin A1C; Future    Stage 3a chronic kidney disease  -     Comprehensive Metabolic Panel; Future  -     Lipid Panel; Future  -     Hemoglobin A1C; Future         Follow up in about 4 months (around 11/2/2024).     Nilson Bahena MD

## 2024-08-22 RX ORDER — AMLODIPINE BESYLATE 5 MG/1
5 TABLET ORAL NIGHTLY
Qty: 90 TABLET | Refills: 3 | Status: SHIPPED | OUTPATIENT
Start: 2024-08-22

## 2024-08-22 NOTE — TELEPHONE ENCOUNTER
No care due was identified.  [facility] Embedded Care Due Messages. Reference number: [id number].   8/22/2024 2:36:43 PM CDT

## 2024-08-23 NOTE — TELEPHONE ENCOUNTER
Refill Decision Note   Hodan Hair  is requesting a refill authorization.  Brief Assessment and Rationale for Refill:  Approve     Medication Therapy Plan:         Comments:     Note composed:7:44 PM 08/22/2024

## 2024-08-26 RX ORDER — METFORMIN HYDROCHLORIDE 500 MG/1
TABLET ORAL
Qty: 180 TABLET | Refills: 1 | Status: SHIPPED | OUTPATIENT
Start: 2024-08-26

## 2024-08-26 NOTE — TELEPHONE ENCOUNTER
Refill Decision Note   Hodan Hair  is requesting a refill authorization.  Brief Assessment and Rationale for Refill:  Approve     Medication Therapy Plan:  No dose adjustment recommended per renal fxn.       Pharmacist review requested: Yes   Extended chart review required: Yes   Comments:     Note composed:11:52 AM 08/26/2024

## 2024-08-26 NOTE — TELEPHONE ENCOUNTER
No care due was identified.  [facility] Embedded Care Due Messages. Reference number: [id number].   8/26/2024 9:42:19 AM CDT

## 2024-08-26 NOTE — TELEPHONE ENCOUNTER
Refill Routing Note   Medication(s) are not appropriate for processing by [facility] for the following reason(s):        Required labs abnormal    [facility] action(s):  Defer             Pharmacist review requested: Yes     Appointments  past 12m or future 3m with PCP    Date Provider   Last Visit   7/2/2024 Nilson Bahena MD   Next Visit   12/3/2024 Nilson Bahena MD   ED visits in past 90 days: 0        Note composed:9:50 AM 08/26/2024

## 2024-10-04 DIAGNOSIS — E11.9 TYPE 2 DIABETES MELLITUS WITHOUT COMPLICATION, WITHOUT LONG-TERM CURRENT USE OF INSULIN: ICD-10-CM

## 2024-10-04 RX ORDER — TIRZEPATIDE 5 MG/.5ML
5 INJECTION, SOLUTION SUBCUTANEOUS
Qty: 6 ML | Refills: 0 | Status: SHIPPED | OUTPATIENT
Start: 2024-10-04

## 2024-10-04 RX ORDER — TIRZEPATIDE 5 MG/.5ML
5 INJECTION, SOLUTION SUBCUTANEOUS
Qty: 2 ML | Refills: 2 | Status: CANCELLED | OUTPATIENT
Start: 2024-10-04

## 2024-10-04 NOTE — TELEPHONE ENCOUNTER
Care Due:                  Date            Visit Type   Department     Provider  --------------------------------------------------------------------------------                                EP -                              PRIMARY      [facility] INTERNAL  Last Visit: 07-      CARE ([facility])   MEDICINE       Nilsonsigifredo Bahena                              EP -                              PRIMARY      [facility] INTERNAL  Next Visit: 12-      CARE ([facility])   MEDICINE       Nilsonzarbina Bahena                                                            Last  Test          Frequency    Reason                     Performed    Due Date  --------------------------------------------------------------------------------    HBA1C.......  6 months...  metFORMIN, tirzepatide...  06- 12-    Health [facility] Embedded Care Due Messages. Reference number: [id number].   10/04/2024 8:02:21 AM CDT

## 2024-10-04 NOTE — TELEPHONE ENCOUNTER
No care due was identified.  [facility] Embedded Care Due Messages. Reference number: [id number].   10/04/2024 2:08:08 PM CDT

## 2024-10-04 NOTE — TELEPHONE ENCOUNTER
Refill Decision Note   Hodan Hair  is requesting a refill authorization.  Brief Assessment and Rationale for Refill:  Approve     Medication Therapy Plan:  FLOS      Comments:     Note composed:4:47 PM 10/04/2024

## 2024-11-13 NOTE — TELEPHONE ENCOUNTER
Anesthesia Post-op Note    Patient: Porsche Cosby  Procedure(s) Performed: ESOPHAGOGASTRODUODENOSCOPY (EGD)  COLONOSCOPY  Anesthesia type: MAC    Vitals Value Taken Time   Temp 36.5 °C (97.7 °F) 11/13/24 1136   Pulse 79 11/13/24 1136   Resp 20 11/13/24 1136   SpO2 95 % 11/13/24 1136   /71 11/13/24 1134   Vitals shown include unfiled device data.      Patient Location: Phase II  Post-op Vital Signs:stable  Level of Consciousness: awake and alert  Respiratory Status: spontaneous ventilation  Cardiovascular stable  Hydration: euvolemic  Pain Management: adequately controlled  Handoff: Handoff to receiving clinician was performed and questions were answered  Vomiting: none  Nausea: None  Airway Patency:patent  Post-op Assessment: no complications and patient tolerated procedure well      No notable events documented.                       See her bp readings in email and she is on valsartan and exforge as ordered.  Please advise.

## 2024-11-19 ENCOUNTER — LAB VISIT (OUTPATIENT)
Dept: LAB | Facility: HOSPITAL | Age: 59
End: 2024-11-19
Payer: COMMERCIAL

## 2024-11-19 DIAGNOSIS — E78.49 OTHER HYPERLIPIDEMIA: ICD-10-CM

## 2024-11-19 DIAGNOSIS — I10 ESSENTIAL HYPERTENSION: ICD-10-CM

## 2024-11-19 DIAGNOSIS — N18.31 STAGE 3A CHRONIC KIDNEY DISEASE: ICD-10-CM

## 2024-11-19 DIAGNOSIS — E11.9 TYPE 2 DIABETES MELLITUS WITHOUT COMPLICATION, WITHOUT LONG-TERM CURRENT USE OF INSULIN: ICD-10-CM

## 2024-11-19 LAB
ALBUMIN SERPL BCP-MCNC: 3.6 G/DL (ref 3.5–5.2)
ALP SERPL-CCNC: 108 U/L (ref 40–150)
ALT SERPL W/O P-5'-P-CCNC: 15 U/L (ref 10–44)
ANION GAP SERPL CALC-SCNC: 11 MMOL/L (ref 8–16)
AST SERPL-CCNC: 20 U/L (ref 10–40)
BILIRUB SERPL-MCNC: 0.3 MG/DL (ref 0.1–1)
BUN SERPL-MCNC: 10 MG/DL (ref 6–20)
CALCIUM SERPL-MCNC: 9.3 MG/DL (ref 8.7–10.5)
CHLORIDE SERPL-SCNC: 103 MMOL/L (ref 95–110)
CHOLEST SERPL-MCNC: 165 MG/DL (ref 120–199)
CHOLEST/HDLC SERPL: 3.7 {RATIO} (ref 2–5)
CO2 SERPL-SCNC: 24 MMOL/L (ref 23–29)
CREAT SERPL-MCNC: 1.3 MG/DL (ref 0.5–1.4)
EST. GFR  (NO RACE VARIABLE): 47.4 ML/MIN/1.73 M^2
ESTIMATED AVG GLUCOSE: 114 MG/DL (ref 68–131)
GLUCOSE SERPL-MCNC: 121 MG/DL (ref 70–110)
HBA1C MFR BLD: 5.6 % (ref 4–5.6)
HDLC SERPL-MCNC: 45 MG/DL (ref 40–75)
HDLC SERPL: 27.3 % (ref 20–50)
LDLC SERPL CALC-MCNC: 72.6 MG/DL (ref 63–159)
NONHDLC SERPL-MCNC: 120 MG/DL
POTASSIUM SERPL-SCNC: 3.7 MMOL/L (ref 3.5–5.1)
PROT SERPL-MCNC: 7.3 G/DL (ref 6–8.4)
SODIUM SERPL-SCNC: 138 MMOL/L (ref 136–145)
TRIGL SERPL-MCNC: 237 MG/DL (ref 30–150)

## 2024-11-19 PROCEDURE — 80061 LIPID PANEL: CPT | Performed by: INTERNAL MEDICINE

## 2024-11-19 PROCEDURE — 83036 HEMOGLOBIN GLYCOSYLATED A1C: CPT | Performed by: INTERNAL MEDICINE

## 2024-11-19 PROCEDURE — 36415 COLL VENOUS BLD VENIPUNCTURE: CPT | Mod: PO | Performed by: INTERNAL MEDICINE

## 2024-11-19 PROCEDURE — 80053 COMPREHEN METABOLIC PANEL: CPT | Performed by: INTERNAL MEDICINE

## 2024-12-03 ENCOUNTER — OFFICE VISIT (OUTPATIENT)
Dept: INTERNAL MEDICINE | Facility: CLINIC | Age: 59
End: 2024-12-03
Payer: COMMERCIAL

## 2024-12-03 VITALS
WEIGHT: 218.25 LBS | SYSTOLIC BLOOD PRESSURE: 124 MMHG | HEIGHT: 65 IN | DIASTOLIC BLOOD PRESSURE: 78 MMHG | OXYGEN SATURATION: 97 % | RESPIRATION RATE: 16 BRPM | BODY MASS INDEX: 36.36 KG/M2 | TEMPERATURE: 97 F | HEART RATE: 80 BPM

## 2024-12-03 DIAGNOSIS — Z23 NEED FOR VACCINATION: ICD-10-CM

## 2024-12-03 DIAGNOSIS — I10 ESSENTIAL HYPERTENSION: ICD-10-CM

## 2024-12-03 DIAGNOSIS — E11.9 TYPE 2 DIABETES MELLITUS WITHOUT COMPLICATION, WITHOUT LONG-TERM CURRENT USE OF INSULIN: Primary | ICD-10-CM

## 2024-12-03 DIAGNOSIS — E78.49 OTHER HYPERLIPIDEMIA: ICD-10-CM

## 2024-12-03 DIAGNOSIS — N18.31 STAGE 3A CHRONIC KIDNEY DISEASE: ICD-10-CM

## 2024-12-03 PROCEDURE — 1159F MED LIST DOCD IN RCRD: CPT | Mod: CPTII,S$GLB,, | Performed by: INTERNAL MEDICINE

## 2024-12-03 PROCEDURE — 3074F SYST BP LT 130 MM HG: CPT | Mod: CPTII,S$GLB,, | Performed by: INTERNAL MEDICINE

## 2024-12-03 PROCEDURE — 1160F RVW MEDS BY RX/DR IN RCRD: CPT | Mod: CPTII,S$GLB,, | Performed by: INTERNAL MEDICINE

## 2024-12-03 PROCEDURE — 3066F NEPHROPATHY DOC TX: CPT | Mod: CPTII,S$GLB,, | Performed by: INTERNAL MEDICINE

## 2024-12-03 PROCEDURE — 90471 IMMUNIZATION ADMIN: CPT | Mod: S$GLB,,, | Performed by: INTERNAL MEDICINE

## 2024-12-03 PROCEDURE — 3061F NEG MICROALBUMINURIA REV: CPT | Mod: CPTII,S$GLB,, | Performed by: INTERNAL MEDICINE

## 2024-12-03 PROCEDURE — 3044F HG A1C LEVEL LT 7.0%: CPT | Mod: CPTII,S$GLB,, | Performed by: INTERNAL MEDICINE

## 2024-12-03 PROCEDURE — 3078F DIAST BP <80 MM HG: CPT | Mod: CPTII,S$GLB,, | Performed by: INTERNAL MEDICINE

## 2024-12-03 PROCEDURE — 99999 PR PBB SHADOW E&M-EST. PATIENT-LVL IV: CPT | Mod: PBBFAC,,, | Performed by: INTERNAL MEDICINE

## 2024-12-03 PROCEDURE — 90656 IIV3 VACC NO PRSV 0.5 ML IM: CPT | Mod: S$GLB,,, | Performed by: INTERNAL MEDICINE

## 2024-12-03 PROCEDURE — 99214 OFFICE O/P EST MOD 30 MIN: CPT | Mod: 25,S$GLB,, | Performed by: INTERNAL MEDICINE

## 2024-12-03 PROCEDURE — 3008F BODY MASS INDEX DOCD: CPT | Mod: CPTII,S$GLB,, | Performed by: INTERNAL MEDICINE

## 2024-12-03 NOTE — PROGRESS NOTES
Subjective:       Patient ID: Hodan Hair is a 59 y.o. female.    Chief Complaint: Hypertension (5 mos wlabs. ), Diabetes, and Shoulder Pain (Left bottom of shoulder in back.  Hurts when lifts arm like blowing drying area. 0 pain today.  Does pilates and doing stretches to help area.  )    History of Present Illness    Hodan presents today for follow-up of medical conditions which include type 2 diabetes mellitus, hypertension, hyperlipidemia, chronic kidney disease.  The patient is on Mounjaro mg subQ every 7 days for management of diabetes.  She is not experiencing any hypoglycemic symptoms.  She is using amlodipine and carvedilol for management of hypertension.  She does not routinely monitor her blood pressures.  No medication side effects have been noted.  For hypothyroidism she is using levothyroxine without experiencing tremors or palpitations.  She is using Vytorin for management of hyperlipidemia.    WEIGHT MANAGEMENT AND DIABETES:  She reports continued weight loss, currently weighing 218 lbs, down from 242 lbs in May - a total loss of 24 lbs over approximately 5 months. She is taking Mounjaro 5 mg for diabetes management, which has significantly reduced her hunger and food intake. She expresses concern about the potential health implications of her reduced appetite. She rarely has solid bowel movements since starting the medication.  She is also using metformin.  No hypoglycemic episodes have been noted.    MUSCULOSKELETAL:  She reports left shoulder pain located at the bottom of the posterior shoulder, near the scapula, occurring when using a blow dryer or sleeping in an awkward position. She describes it as a burning sensation, possibly nerve-related. She recalls having a pinched nerve in the same area during her teenage years while figure skating. She denies current pain during the exam.    DERMATOLOGICAL:  She notes excess skin and irritation due to recent weight loss, particularly on her arms and  stomach. The excess skin is causing increased friction and irritation. She also complains of skin tags, which are causing itching and discomfort and becoming more numerous with the increased skin friction.    OPHTHALMOLOGICAL:  She has a history of retinal tear.    ENT:  She reports coarseness and mucus in the throat with occasional post-nasal drainage.    SLEEP:  She struggles to fall asleep, often not until 3 AM, typically getting 4-5 hours of sleep most nights.      ROS:  Constitutional: +weight loss  Musculoskeletal: -joint pain  Integumentary: +itching  Psychiatric: +sleep difficulty              Physical Exam  Vitals and nursing note reviewed.   Constitutional:       General: She is not in acute distress.     Appearance: Normal appearance. She is well-developed.   HENT:      Head: Normocephalic and atraumatic.   Eyes:      General: No scleral icterus.     Extraocular Movements: Extraocular movements intact.      Conjunctiva/sclera: Conjunctivae normal.   Neck:      Thyroid: No thyromegaly.      Vascular: No JVD.   Cardiovascular:      Rate and Rhythm: Normal rate and regular rhythm.      Heart sounds: Normal heart sounds. No murmur heard.     No friction rub. No gallop.   Pulmonary:      Effort: Pulmonary effort is normal. No respiratory distress.      Breath sounds: Normal breath sounds. No wheezing or rales.   Abdominal:      General: Bowel sounds are normal.      Palpations: Abdomen is soft. There is no mass.      Tenderness: There is no abdominal tenderness. There is no right CVA tenderness or left CVA tenderness.   Musculoskeletal:         General: Tenderness (tip of left scapula) present. Normal range of motion.      Cervical back: Normal range of motion and neck supple.      Right lower leg: No edema.      Left lower leg: No edema.   Lymphadenopathy:      Cervical: No cervical adenopathy.   Skin:     General: Skin is warm and dry.      Findings: No rash.      Comments: No foot lesions are present.    Neurological:      Mental Status: She is alert and oriented to person, place, and time.      Cranial Nerves: No cranial nerve deficit.      Comments: Sensory exam is intact in both feet on monofilament and vibration testing.   Psychiatric:         Mood and Affect: Mood normal.         Behavior: Behavior normal.         Protective Sensation (w/ 10 gram monofilament):  Right: Intact  Left: Intact    Visual Inspection:  Normal -  Bilateral    Pedal Pulses:   Right: Present  Left: Present    Posterior Tibialis Pulses:   Right:Present  Left: Present    Lab Visit on 11/19/2024   Component Date Value Ref Range Status    Sodium 11/19/2024 138  136 - 145 mmol/L Final    Potassium 11/19/2024 3.7  3.5 - 5.1 mmol/L Final    Chloride 11/19/2024 103  95 - 110 mmol/L Final    CO2 11/19/2024 24  23 - 29 mmol/L Final    Glucose 11/19/2024 121 (H)  70 - 110 mg/dL Final    BUN 11/19/2024 10  6 - 20 mg/dL Final    Creatinine 11/19/2024 1.3  0.5 - 1.4 mg/dL Final    Calcium 11/19/2024 9.3  8.7 - 10.5 mg/dL Final    Total Protein 11/19/2024 7.3  6.0 - 8.4 g/dL Final    Albumin 11/19/2024 3.6  3.5 - 5.2 g/dL Final    Total Bilirubin 11/19/2024 0.3  0.1 - 1.0 mg/dL Final    Comment: For infants and newborns, interpretation of results should be based  on gestational age, weight and in agreement with clinical  observations.    Premature Infant recommended reference ranges:  Up to 24 hours.............<8.0 mg/dL  Up to 48 hours............<12.0 mg/dL  3-5 days..................<15.0 mg/dL  6-29 days.................<15.0 mg/dL      Alkaline Phosphatase 11/19/2024 108  40 - 150 U/L Final    AST 11/19/2024 20  10 - 40 U/L Final    ALT 11/19/2024 15  10 - 44 U/L Final    eGFR 11/19/2024 47.4 (A)  >60 mL/min/1.73 m^2 Final    Anion Gap 11/19/2024 11  8 - 16 mmol/L Final    Cholesterol 11/19/2024 165  120 - 199 mg/dL Final    Comment: The National Cholesterol Education Program (NCEP) has set the  following guidelines (reference ranges) for  Cholesterol:  Optimal.....................<200 mg/dL  Borderline High.............200-239 mg/dL  High........................> or = 240 mg/dL      Triglycerides 11/19/2024 237 (H)  30 - 150 mg/dL Final    Comment: The National Cholesterol Education Program (NCEP) has set the  following guidelines (reference values) for triglycerides:  Normal......................<150 mg/dL  Borderline High.............150-199 mg/dL  High........................200-499 mg/dL      HDL 11/19/2024 45  40 - 75 mg/dL Final    Comment: The National Cholesterol Education Program (NCEP) has set the  following guidelines (reference values) for HDL Cholesterol:  Low...............<40 mg/dL  Optimal...........>60 mg/dL      LDL Cholesterol 11/19/2024 72.6  63.0 - 159.0 mg/dL Final    Comment: The National Cholesterol Education Program (NCEP) has set the  following guidelines (reference values) for LDL Cholesterol:  Optimal.......................<130 mg/dL  Borderline High...............130-159 mg/dL  High..........................160-189 mg/dL  Very High.....................>190 mg/dL      HDL/Cholesterol Ratio 11/19/2024 27.3  20.0 - 50.0 % Final    Total Cholesterol/HDL Ratio 11/19/2024 3.7  2.0 - 5.0 Final    Non-HDL Cholesterol 11/19/2024 120  mg/dL Final    Comment: Risk category and Non-HDL cholesterol goals:  Coronary heart disease (CHD)or equivalent (10-year risk of CHD >20%):  Non-HDL cholesterol goal     <130 mg/dL  Two or more CHD risk factors and 10-year risk of CHD <= 20%:  Non-HDL cholesterol goal     <160 mg/dL  0 to 1 CHD risk factor:  Non-HDL cholesterol goal     <190 mg/dL      Hemoglobin A1C 11/19/2024 5.6  4.0 - 5.6 % Final    Comment: ADA Screening Guidelines:  5.7-6.4%  Consistent with prediabetes  >or=6.5%  Consistent with diabetes    High levels of fetal hemoglobin interfere with the HbA1C  assay. Heterozygous hemoglobin variants (HbS, HgC, etc)do  not significantly interfere with this assay.   However, presence of  multiple variants may affect accuracy.      Estimated Avg Glucose 11/19/2024 114  68 - 131 mg/dL Final       Assessment & Plan:     Assessment & Plan     Assessed weight loss from 242 lbs in May to current 219 lbs, correlating with improved A1C levels   Evaluated kidney function: GFR improved from 43 to 47, still in class 3 range   Assessed left shoulder pain, likely muscular but considering potential nerve involvement   Reviewed vision status; noted previous retinal tear managed by Dr. Amanda   Evaluated post-nasal drainage and reflux symptoms   Considered increasing Mounjaro dose from 5mg to 7.5mg next month based on patient's progress and sensitivity to medication    TYPE 2 DIABETES MELLITUS:   Increased Mounjaro from 5mg to 7.5mg, starting next month.   Contact office if experiencing any issues with new Mounjaro dose.    CHRONIC KIDNEY DISEASE:   Explained GFR improvements and continued monitoring of kidney function.   Hodan to continue weight loss efforts and hydration for kidney function improvement.   Discontinued hydrochlorothiazide.    GASTROESOPHAGEAL REFLUX DISEASE:   Continued pantoprazole for reflux management.    SHOULDER PAIN:   Discussed potential causes of burning sensation in shoulder, including possible nerve irritation.    SKIN DISORDERS:   Provided information on skin tag removal options, including OTC treatments and professional methods.    INSOMNIA:   Recommend improving sleep routine, gradually adjusting bedtime earlier.    PREVENTIVE CARE:   Hodan to complete Cologuard test for colorectal cancer screening.   Flu vaccine administered in office.    FOLLOW-UP:   Follow up in 4-5 months.         Follow up in about 5 months (around 5/3/2025).     Nilson Bahena MD

## 2024-12-23 ENCOUNTER — HOSPITAL ENCOUNTER (OUTPATIENT)
Dept: RADIOLOGY | Facility: HOSPITAL | Age: 59
Discharge: HOME | End: 2024-12-23
Attending: INTERNAL MEDICINE
Payer: COMMERCIAL

## 2024-12-23 VITALS — WEIGHT: 218 LBS | BODY MASS INDEX: 36.32 KG/M2 | HEIGHT: 65 IN

## 2024-12-23 DIAGNOSIS — Z12.31 ENCOUNTER FOR SCREENING MAMMOGRAM FOR BREAST CANCER: ICD-10-CM

## 2024-12-23 PROCEDURE — 77063 BREAST TOMOSYNTHESIS BI: CPT | Mod: TC

## 2024-12-23 PROCEDURE — 77067 SCR MAMMO BI INCL CAD: CPT | Mod: 26,,, | Performed by: RADIOLOGY

## 2024-12-23 PROCEDURE — 77063 BREAST TOMOSYNTHESIS BI: CPT | Mod: 26,,, | Performed by: RADIOLOGY

## 2025-01-16 ENCOUNTER — HOSPITAL ENCOUNTER (OUTPATIENT)
Dept: RADIOLOGY | Facility: HOSPITAL | Age: 60
Discharge: HOME | End: 2025-01-16
Attending: FAMILY MEDICINE
Payer: COMMERCIAL

## 2025-01-16 ENCOUNTER — OFFICE VISIT (OUTPATIENT)
Dept: SPORTS MEDICINE | Facility: CLINIC | Age: 60
End: 2025-01-16
Payer: MEDICAID

## 2025-01-16 VITALS
BODY MASS INDEX: 35.74 KG/M2 | DIASTOLIC BLOOD PRESSURE: 71 MMHG | HEIGHT: 65 IN | WEIGHT: 214.5 LBS | SYSTOLIC BLOOD PRESSURE: 115 MMHG | HEART RATE: 81 BPM

## 2025-01-16 DIAGNOSIS — M67.912 DYSFUNCTION OF LEFT ROTATOR CUFF: ICD-10-CM

## 2025-01-16 DIAGNOSIS — M25.512 LEFT SHOULDER PAIN, UNSPECIFIED CHRONICITY: ICD-10-CM
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
## 2025-01-16 DIAGNOSIS — S29.012A STRAIN OF LEFT RHOMBOID MUSCLE: ICD-10-CM

## 2025-01-16 DIAGNOSIS — M25.512 LEFT SHOULDER PAIN, UNSPECIFIED CHRONICITY: Primary | ICD-10-CM

## 2025-01-16 PROCEDURE — 3074F SYST BP LT 130 MM HG: CPT | Mod: CPTII,,, | Performed by: FAMILY MEDICINE

## 2025-01-16 PROCEDURE — 99214 OFFICE O/P EST MOD 30 MIN: CPT | Mod: PBBFAC,25 | Performed by: FAMILY MEDICINE

## 2025-01-16 PROCEDURE — 73030 X-RAY EXAM OF SHOULDER: CPT | Mod: TC,LT

## 2025-01-16 PROCEDURE — 1160F RVW MEDS BY RX/DR IN RCRD: CPT | Mod: CPTII,,, | Performed by: FAMILY MEDICINE

## 2025-01-16 PROCEDURE — 3078F DIAST BP <80 MM HG: CPT | Mod: CPTII,,, | Performed by: FAMILY MEDICINE

## 2025-01-16 PROCEDURE — 73030 X-RAY EXAM OF SHOULDER: CPT | Mod: 26,LT,, | Performed by: RADIOLOGY

## 2025-01-16 PROCEDURE — 1159F MED LIST DOCD IN RCRD: CPT | Mod: CPTII,,, | Performed by: FAMILY MEDICINE

## 2025-01-16 PROCEDURE — 99999 PR PBB SHADOW E&M-EST. PATIENT-LVL IV: CPT | Mod: PBBFAC,,, | Performed by: FAMILY MEDICINE

## 2025-01-16 PROCEDURE — 3008F BODY MASS INDEX DOCD: CPT | Mod: CPTII,,, | Performed by: FAMILY MEDICINE

## 2025-01-16 PROCEDURE — 99214 OFFICE O/P EST MOD 30 MIN: CPT | Mod: S$PBB,,, | Performed by: FAMILY MEDICINE

## 2025-01-16 RX ORDER — MELOXICAM 7.5 MG/1
7.5 TABLET ORAL DAILY
Qty: 15 TABLET | Refills: 0 | Status: SHIPPED | OUTPATIENT
Start: 2025-01-16

## 2025-01-16 NOTE — PROGRESS NOTES
"HISTORY OF PRESENT ILLNESS   Hodan Hair, a 59 y.o. female, presents today for evaluation of her left SHOULDER. Patient is right hand dominant.    Patient reports onset of chronic pain beginning 3 months ago. Patient reports  that her onset of pain followed moving up in weight at [employer] class about three months ago. Patient also suspects that her ergonomics and positioning at her desk at work may be worsening her pain . Pain is located along posterior aspect of SHOULDER, when describing pain patient points to her rhomboids. Patient described pain as "underneath my shoulder blade." Pain is 2/10 at present & up to 9/10 with provacative activity including  reaching, sitting with arm pressed against her desk . Pain is described as "a lightning bolt." Patient states pain does not radiate. Patient reports she feels "tension" in her neck, but not this lightning bolt pain.    Associated symptoms include decreased ROM. Pain is aggravated by activities above & occur daily . Symptoms do interfere with sleep. Patient reports pain & symptoms are getting better, but patient attributes this to going down in weights in Pilates. Patient reports no prior surgery to SHOULDER.     Prior treatment Hodan Hair has tried   OTC Acetaminophen - No  OTC NSAID - Yes - ibuprofen, 600 mg at a time   Rx NSAID - No   Rx Narcotic/Other - No   Brace - No   Injection - Cortisone - No   Injection - Biologics - No   Activity Modification - No  Physical Therapy - No   Home Exercise Program - No  Assistive Device - No  Other - None    Review of systems (ROS):  A 10+ review of systems was performed with pertinent positives and negatives noted above in the history of present illness. Other systems were negative unless otherwise specified.    PHYSICAL EXAMINATION  General:  The patient is alert and oriented x 3. Mood is pleasant. Observation of ears, eyes and nose reveal no gross abnormalities. HEENT: NCAT, sclera anicteric. Lungs: Respirations " are equal and unlabored.     SHOULDER EXAMINATION     OBSERVATION:     Swelling  none  Deformity  none   Discoloration  none   Scapular winging none   Scars   none  Atrophy  none    TENDERNESS / CREPITUS (T/C):          T/C      T/C   Clavicle   -/-  SUPRAspinatus    -/-     AC Jt.    -/-  INFRAspinatus  -/-    SC Jt.    -/-  Deltoid    -/-      G. Tuberosity  -/-  LH BICEP groove  -/-   Acromion:  -/-  Midline Neck   -/-     Scapular Spine -/-  Trapezium   -/-   SMA Scapula  -/-  GH jt. line - post  -/-     Scapulothoracic  -/-         ROM:     Right shoulder   Left shoulder        AROM (PROM)   AROM (PROM)   FE    170° (175°)     170° (175°)     ER at 0°    60°  (65°)    60°  (65°)   ER at 90° ABD  90°  (90°)    90°  (90°)   IR at 90°  ABD   NA  (40°)     NA  (40°)      IR (spine level)   T10     T10    STRENGTH: (* = with pain) RIGHT SHOULDER  LEFT SHOULDER   SCAPTION   5/5    5/5    IR    5/5    5/5   ER    5/5    5/5   BICEPS   5/5    5/5   Deltoid    5/5    5/5     SIGNS:  Painful side       NEER   -   OJULESS        -    WALLACE   -   SPEEDS        -   DROP ARM   -   BELLY PRESS       -    X-Body ADD    -   LIFT-OFF        -   HORNBLOWERS      -              STABILITY TESTING   RIGHT SHOULDER  LEFT SHOULDER     Translation     Anterior up face    up face    Posterior up face   up face    Sulcus  < 10mm   < 10 mm     Signs   Apprehension   neg     neg       Relocation   no change    no change      Jerk test  neg    neg    EXTREMITY NEURO-VASCULAR EXAM    Sensation grossly intact to light touch all dermatomal regions.    DTR 2+ Biceps, Triceps, BR and Negative Kenys sign   Grossly intact motor function at Elbow, Wrist and Hand   Distal pulses radial and ulnar 2+, brisk cap refill, symmetric.      NECK:  Painless FROM and spinous processes non-tender. Negative Spurlings sign.       Other Findings:    ASSESSMENT & PLAN   Assessment  #1 Osteoarthritis of glenohumeral joint, left /nd  W/ supraspinatus  tendinosis    No evidence of neurologic pathology  No evidence of vascular pathology    Imaging studies reviewed:   X-ray shoulder, left, 1/16/25    Plan  Reassuring eval  Likely from overuse w/ recent wt increase in piliates class  Possible t spine component    We discussed options including    Watchful waiting / relative rest    Physical therapy X    Injection therapy Csi   discussed; deferred by pt    Consultation    The patient chooses As above   x = prescribed  CSI = corticosteroid injection  VSI = viscosupplement injection  PRPI = platelet rich plasma injection  ia = intra articular  R = right  L = left  B = bilateral   nfSx = surgical consultation was recommended, but patient is not interested in consultation at this time    Physical Therapy        Formal (fPT), @ [facility]    Formal (fPT), @ [facility]        Homegoing (hgPT), per concurrent fPT recommendations    Homegoing (hgPT), per prior fPT recommendations    Homegoing (hgPT), handout provided        w/  (atPT)    [blank] = not prescribed  x = prescribed  b = prescribed, and begin as indicated  t = continue as indicated  r = prescribed, and restart as indicated  p = completed prior as indicated  hs = prescribed, and with high school   col = prescribed, and with college or university   nfPT = physical therapy was recommended, but patient is not interested in PT at this time    Activity (e.g. sports, work) restrictions    [blank] = as tolerated  pt = per physical therapist  at = per   NWB = non weight bearing on affected lower extremity, with crutches assistance for ambulation    Bracing    [blank] = not prescribed  r = recommended, but not fit with at todays visit  f = prescribed and fit with at todays visit  t = continue as indicated  d = d/c  p = as needed  rare = use on rare, as-needed basis; advised against chronic use    Pain management    [blank] = No prescription  necessary. A handout detailing dosing of appropriate   over-the-counter musculoskeletal analgesics was made available to the patient.   m = meloxicam x 14 days  mp = 14 day course of meloxicam prescribed prior    Follow up o    [blank] = as needed  [number] = in [number] weeks  CSI = for corticosteroid injection  VSI = for viscosupplement injection or injection series  PRP = for platelet rich plasma injection or injection series  MRI = after MRI imaging  ns = should surgical options be deferred (no surgery)  o = appointment offered, deferred by patient    Should symptoms worsen or fail to resolve, consider    Revisiting the above options and / or X-ray spine thoracic  D&T csi supraspin tend vs iagh     Vocation:     specializing in ADHD, WFH, desk work

## 2025-01-27 RX ORDER — LEVOTHYROXINE SODIUM 112 UG/1
112 TABLET ORAL
Qty: 90 TABLET | Refills: 0 | Status: SHIPPED | OUTPATIENT
Start: 2025-01-27

## 2025-01-27 NOTE — TELEPHONE ENCOUNTER
No care due was identified.  [facility] Embedded Care Due Messages. Reference number: [id number].   1/27/2025 8:01:28 AM CST

## 2025-01-27 NOTE — TELEPHONE ENCOUNTER
Refill Decision Note   Hodan Hair  is requesting a refill authorization.  Brief Assessment and Rationale for Refill:  Approve     Medication Therapy Plan:         Comments:     Note composed:2:45 PM 01/27/2025

## 2025-01-30 ENCOUNTER — PATIENT MESSAGE (OUTPATIENT)
Dept: INTERNAL MEDICINE | Facility: CLINIC | Age: 60
End: 2025-01-30
Payer: MEDICAID

## 2025-01-31 ENCOUNTER — CLINICAL SUPPORT (OUTPATIENT)
Dept: REHABILITATION | Facility: HOSPITAL | Age: 60
End: 2025-01-31
Payer: MEDICAID

## 2025-01-31 DIAGNOSIS — S29.012A STRAIN OF LEFT RHOMBOID MUSCLE: ICD-10-CM

## 2025-01-31 DIAGNOSIS — M25.512 LEFT SHOULDER PAIN, UNSPECIFIED CHRONICITY: ICD-10-CM

## 2025-01-31 PROCEDURE — 97162 PT EVAL MOD COMPLEX 30 MIN: CPT | Mod: KX,PN

## 2025-01-31 PROCEDURE — 97530 THERAPEUTIC ACTIVITIES: CPT | Mod: KX,PN

## 2025-01-31 NOTE — TELEPHONE ENCOUNTER
Calling pharmacy services   Provider services pa      Id# [id number]    Waiting on cover my meds pa request.    [email or web address]   [email or web address] didn't work. Says na

## 2025-01-31 NOTE — PATIENT INSTRUCTIONS
[email or web address]        Taught, demonstrated proper ergonomics at work as below:  Knees/hips at 90 degrees  Feet flat on the floor  Back against the chair with a lumbar roll to support natural lordotic curvature  Computer screen at eye level or slightly lower by a few degrees (<20 degrees)  Elbows at 90 degrees  Wrists slightly extended while typing  Shoulders rolled back

## 2025-01-31 NOTE — TELEPHONE ENCOUNTER
Pa thru epic wasn't successful.  Says cannot match patient.    Called   [facility] - artm down. She asked me  To call back.    Called and told [employer] about delay.   Medicaid  [id number]/14/24, got before with [employer]

## 2025-01-31 NOTE — PROGRESS NOTES
Outpatient Rehab    Physical Therapy Evaluation/treatment    Patient Name: Hodan Hair  MRN: [id number]  YOB: 1965  Today's Date: 2/3/2025    Therapy Diagnosis:   Encounter Diagnoses   Name Primary?    Left shoulder pain, unspecified chronicity     Strain of left rhomboid muscle      Physician: Alfonso Sarmiento, *    Physician Orders: Eval and Treat  Medical Diagnosis:   Diagnosis   M25.512 (ICD-10-CM) - Left shoulder pain, unspecified chronicity   S29.012A (ICD-10-CM) - Strain of left rhomboid muscle       Visit # / Visits Authorized:     Date of Evaluation:  2025   Insurance Authorization Period: 2025 to2025  Plan of Care Certification:  2025 to 3/28/25 (8 weeks)      Time In: 1100   Time Out: 1200  Total Time: 60   Total Billable Time: 60         Subjective   History of Present Illness  Hodan is a 59 y.o. female who reports to physical therapy with a chief concern of Left shoulder pain. According to the patient's chart, Hodan has a past medical history of ADHD (attention deficit hyperactivity disorder), Anxiety disorder, Hyperlipidemia, Hypertension, Hypothyroid, and Type 2 diabetes mellitus without complications. Hodan has a past surgical history that includes  section; Cholecystectomy; [place] tooth extraction; Trigger finger release; Endometrial ablation; Breast biopsy (Right, 2020); and Breast biopsy (Right, 06/10/2020).    The patient reports a medical diagnosis of M25.512 (ICD-10-CM) - Left shoulder pain, unspecified chronicity  S29.012A (ICD-10-CM) - Strain of left rhomboid muscle.    Diagnostic tests related to this condition: X-ray.   X-Ray Details: Glenohumeral joint space is normal.  Bony structures are intact.  No soft tissue calcification is identified.    Dominant Hand: Right  History of Present Condition/Illness: 6 months of pain. Performed pilates with 8# weights. Right after that is when the pain started in the left shoulder. She is a  " that specializes in ADHD. Lots of computer work. Endorses neck pain. Video  on ipad. Continuing pilates. Denies n/t but "lightning bolt" pain in left rhomboid.  Was a  at [employer] when little. Just getting back to work, had long covid, now has DM II. Also has HTN, HLD, ADHD, anxiety/depression. Feels worst with weight-bearing through the elbow at her desk and/or when stretching.    Living Arrangements  Living Situation  Housing: Home independently  Living Arrangements: Spouse/significant other, Children, Other (Comment)  Other Living Arrangements Comment: 26 y.o. daughter.        Employment  Employment Status: Employed part-time   ADHD .      Past Medical History/Physical Systems Review:   Hodan Hair  has a past medical history of ADHD (attention deficit hyperactivity disorder), Anxiety disorder, Hyperlipidemia, Hypertension, Hypothyroid, and Type 2 diabetes mellitus without complications.    Hodan Hair  has a past surgical history that includes  section; Cholecystectomy; [place] tooth extraction; Trigger finger release; Endometrial ablation; Breast biopsy (Right, 2020); and Breast biopsy (Right, 06/10/2020).    Hodan has a current medication list which includes the following prescription(s): amlodipine, blood sugar diagnostic, blood-glucose meter, carvedilol, escitalopram oxalate, ezetimibe-simvastatin 10-10 mg, lancets, levothyroxine, meloxicam, metformin, pantoprazole, tirzepatide, vit c,k-wr-gzxfu-lutein-zeaxan, and vyvanse.    Review of patient's allergies indicates:   Allergen Reactions    No known drug allergies         Objective   Posture  Patient presents with a Forward head position. Flat cervical spine, Flat lumbar spine, and Flat thoracic spine is observed.     Bilateral scapulae are: Elevated and Protracted              Subcranial Range of Motion   Active Restricted? Passive Restricted? Pain   Flexion         Protraction         Retraction     "       WNL-- cervical range of motion.    Shoulder Range of Motion  Right Shoulder   Active (deg) Passive (deg) Pain   Flexion 175       Extension         Scaption         ABduction 180       ADduction         Horizontal ABduction         Horizontal ADduction         External Rotation (Shoulder ABducted 0 degrees)         External Rotation (Shoulder ABducted 45 degrees)         External Rotation (Shoulder ABducted 90 degrees) 85       Internal Rotation (Shoulder ABducted 0 degrees)         Internal Rotation (Shoulder ABducted 45 degrees)         Internal Rotation (Shoulder ABducted 90 degrees) 90         Left Shoulder   Active (deg) Passive (deg) Pain   Flexion 145       Extension         Scaption         ABduction 163       ADduction         Horizontal ABduction         Horizontal ADduction         External Rotation (Shoulder ABducted 0 degrees)         External Rotation (Shoulder ABducted 45 degrees)         External Rotation (Shoulder ABducted 90 degrees) 80       Internal Rotation (Shoulder ABducted 0 degrees)         Internal Rotation (Shoulder ABducted 45 degrees)         Internal Rotation (Shoulder ABducted 90 degrees) 70                     Shoulder Strength - Planes of Motion   Right Strength Right Pain Left Strength Left  Pain   Flexion 5   4+ Yes   Extension           ABduction 5   4+     ADduction           Horizontal ABduction           Horizontal ADduction           Internal Rotation 0° 5   5     Internal Rotation 90°           External Rotation 0° 5   5     External Rotation 90° 5   5         Shoulder Strength - Scapular Stabilizing Muscles   Right Strength Right Pain Left Strength Left  Pain   Lower Trapezius 4-   4-     Middle Trapezius 4+   3+     Rhomboids 4+   3+       Shoulder Strength Details  L serratus anterior: 4-/5;   R serratus anterior: 5/5              Shoulder Special Tests       Lateral avn: negative; shrug: negative; infraspinatus test: negative.           Intake Outcome Measure for  FOTO Survey    Therapist reviewed FOTO scores for Hodan Hair on 1/31/2025.   FOTO report - see Media section or FOTO account episode details.     Intake Score: 65 %    Treatment:  Therapeutic Activity  Therapeutic Activity 1: HEP review: Quadruped thoracic rotations, prone scapular retractions with shoulder extension, prone t's, prone y's. Reviewed ergonomics for workspace and it's importance for her neck, shoulders, back, hips and knees.    Patient's spiritual, cultural, and educational needs considered and patient agreeable to plan of care and goals.     Assessment & Plan   Assessment  Hodan presents with a condition of Low complexity.   Presentation of Symptoms: Stable  Will Comorbidities Impact Care: Yes  DM II, obesity, sedentary job    Functional Limitations: Activity tolerance  Impairments: Impaired physical strength, Pain with functional activity    Patient Goal for Therapy (PT): decrease pain, improve shoulder AROM (L)  Prognosis: Good  Assessment Details: Patient presents with signs and symptoms consistent with a left rhomboid strain. She reports pain when stretching the left rhomboid and/or weight-bearing through her left upper extremity (via elbow on her desk). She presents with impaired left shoulder flexion active range of motion and impaired strength in her middle, lower trapezius and rhomboids. Will benefit from therapy 1x/wk to promote decreased pain and improved mobility in the left shoulder.    Plan  From a physical therapy perspective, the patient would benefit from: Skilled Rehab Services    Planned therapy interventions include: Therapeutic exercise, Therapeutic activities, Neuromuscular re-education, and Manual therapy.    Planned modalities to include: Electrical stimulation - passive/unattended and Other (Comment). DN      Visit Frequency: 1 times Per Week for 8 Weeks.       This plan was discussed with Patient.   Discussion participants: Agreed Upon Plan of Care  Plan details:  Periscapular strengthening, thoracic mobility, LUE mobility, overhead lifts.          Goals:   Active       long term goals       Patient will improve periscapular strength to >=4+/5 to denote improved postural control.       Start:  02/03/25    Expected End:  03/31/25            Patient will demonstrate equal active range of motion in left to that of her right upper extremity.       Start:  02/03/25    Expected End:  03/31/25            Patient will improve FOTO score by 10% or more to denote improvement in functional mobility and pain level.       Start:  02/03/25    Expected End:  03/31/25            Patient will report </=1/10 pain level when weight-bearing through left elbow on desk and/or when performing functional tasks during the week.       Start:  02/03/25    Expected End:  03/31/25               short term goals       Patient will improve strength in periscapular muscles (LT, MT, rhomb) by 1/2 Manual muscle grade score to promote improved postural control.       Start:  02/03/25    Expected End:  03/03/25            Patient will improve active range of motion of the left shoulder by 10 degrees or more (flexion, abduction) to promote improved degrees of freedom in the left shoulder.       Start:  02/03/25    Expected End:  03/03/25                    Geovanna Diego, PT, DPT  2/3/2025

## 2025-02-03 PROBLEM — S29.012A: Status: ACTIVE | Noted: 2025-02-03

## 2025-02-05 DIAGNOSIS — I10 HYPERTENSION, UNSPECIFIED TYPE: ICD-10-CM

## 2025-02-05 RX ORDER — METFORMIN HYDROCHLORIDE 500 MG/1
TABLET ORAL
Qty: 180 TABLET | Refills: 1 | Status: SHIPPED | OUTPATIENT
Start: 2025-02-05

## 2025-02-05 RX ORDER — CARVEDILOL 25 MG/1
TABLET ORAL
Qty: 180 TABLET | Refills: 3 | Status: SHIPPED | OUTPATIENT
Start: 2025-02-05

## 2025-02-05 NOTE — TELEPHONE ENCOUNTER
Care Due:                  Date            Visit Type   Department     Provider  --------------------------------------------------------------------------------                                EP -                              PRIMARY      MET INTERNAL  Last Visit: 12-      [facility]   MEDICINE       Nilsonsigifredo Bahena                              EP -                              PRIMARY      [facility] INTERNAL  Next Visit: 05-      [facility] ([facility])   MEDICINE       Nilsonzabrina Bahena                                                            Last  Test          Frequency    Reason                     Performed    Due Date  --------------------------------------------------------------------------------    TSH.........  12 months..  levothyroxine............  02- 02-    Health [facility] Embedded Care Due Messages. Reference number: [id number].   2/05/2025 8:02:25 AM CST

## 2025-02-06 NOTE — TELEPHONE ENCOUNTER
Refill Decision Note   Hodan Hair  is requesting a refill authorization.  Brief Assessment and Rationale for Refill:  Approve     Medication Therapy Plan:        Comments:     Note composed:8:12 PM 02/05/2025

## 2025-02-07 NOTE — TELEPHONE ENCOUNTER
called   Call started 10:33.  3 minutes just to get to person to speak to.  Spoke to audrey.    Other options byetta, janumet or janumet xr,  Ozempic and rybelsus, trulicity or victoza.  These are preferred.     Mounjaro started 2/22/24   Aic was 7.4.  last aic 11/19/24 5.6  Approval good 1 year  till  2/7/2026  Auth# [id number]    Rejecting stating has primary insurance.  She needs to call   If pharmacy still gets a rejection- she  Needs to call  to remove rejection.  Called ended at 10:44.

## 2025-02-10 ENCOUNTER — CLINICAL SUPPORT (OUTPATIENT)
Dept: REHABILITATION | Facility: HOSPITAL | Age: 60
End: 2025-02-10
Payer: MEDICAID

## 2025-02-10 DIAGNOSIS — M25.512 LEFT SHOULDER PAIN, UNSPECIFIED CHRONICITY: Primary | ICD-10-CM

## 2025-02-10 DIAGNOSIS — S29.012A STRAIN OF LEFT RHOMBOID MUSCLE: ICD-10-CM

## 2025-02-10 PROCEDURE — 97110 THERAPEUTIC EXERCISES: CPT | Mod: PN

## 2025-02-10 NOTE — PROGRESS NOTES
Outpatient Rehab    Physical Therapy Visit    Patient Name: Hodan Hair  MRN: [id number]  YOB: 1965  Today's Date: 2/10/2025    Therapy Diagnosis:   Encounter Diagnoses   Name Primary?    Left shoulder pain, unspecified chronicity Yes    Strain of left rhomboid muscle      Physician: Alfonso Sarmiento, *    Physician Orders: Eval and Treat  Medical Diagnosis:   Diagnosis   M25.512 (ICD-10-CM) - Left shoulder pain, unspecified chronicity   S29.012A (ICD-10-CM) - Strain of left rhomboid muscle         Visit # / Visits Authorized:  1 / 1   Date of Evaluation:  1/31/2025   Insurance Authorization Period: 01/16/2025 to11/12/2025  Plan of Care Certification:  1/31/2025 to 3/28/25 (8 weeks)      Time In: 1300   Time Out: 1400  Total Time: 60   Total Billable Time: 60         Subjective   feeling okay today. Worked out yesterday and felt fine. Fell on Monday after stepping over a doggy gate..  Pain reported as 0/10.      Objective            Treatment:  Therapeutic Exercise  Therapeutic Exercise Activity 1: +Quadruped thoracic rotations 2x10 reps, 5'' hold  Therapeutic Exercise Activity 2: +Dowel flexion seated 2x10 reps 5'' hold         Balance/Neuromuscular Re-Education  Balance/Neuromuscular Re-Education Activity 1: Prone scapular retractions with shoulder extensions 2x10 reps, 5'' hold  Balance/Neuromuscular Re-Education Activity 2: +Prone T's 2x10 reps, 5'' hold  Balance/Neuromuscular Re-Education Activity 3: +Prone Y's 2x10 reps, 5'' hold  Balance/Neuromuscular Re-Education Activity 4:  press with 2# dumbbells 2x10 reps   5'' hold  Balance/Neuromuscular Re-Education Activity 5: +Rows green band 2x10 reps, 3'' hold 5'' hold  Balance/Neuromuscular Re-Education Activity 6: +Straight arm pull-down 2x10 reps, 5'' hold  Balance/Neuromuscular Re-Education Activity 7: +Wall slides with shoulder shrug at top 2x10 reps, 5'' hold  Balance/Neuromuscular Re-Education Activity 8: +Wall push-up 2x10  reps    HEP updated 2/10.       Patient's spiritual, cultural, and educational needs considered and patient agreeable to plan of care and goals.     Assessment & Plan   Assessment: Patient presents with no pain today, slight right shoulder irritability due to a fall last week. Addressed periscapular strength, thoracic mobility and thoracic erector spinae strength in today's session. Continue POC. HEP updated today.  Evaluation/Treatment Tolerance: Patient tolerated treatment well        Plan: periscapular strengthening, thoracic erector spinae strengthening, thoracic mobility. proper ergonomics.    Goals:   Active       long term goals       Patient will improve periscapular strength to >=4+/5 to denote improved postural control. (Progressing)       Start:  02/03/25    Expected End:  03/31/25            Patient will demonstrate equal active range of motion in left to that of her right upper extremity. (Progressing)       Start:  02/03/25    Expected End:  03/31/25            Patient will improve FOTO score by 10% or more to denote improvement in functional mobility and pain level. (Progressing)       Start:  02/03/25    Expected End:  03/31/25            Patient will report </=1/10 pain level when weight-bearing through left elbow on desk and/or when performing functional tasks during the week. (Progressing)       Start:  02/03/25    Expected End:  03/31/25               short term goals       Patient will improve strength in periscapular muscles (LT, MT, rhomb) by 1/2 Manual muscle grade score to promote improved postural control. (Progressing)       Start:  02/03/25    Expected End:  03/03/25            Patient will improve active range of motion of the left shoulder by 10 degrees or more (flexion, abduction) to promote improved degrees of freedom in the left shoulder. (Progressing)       Start:  02/03/25    Expected End:  03/03/25                Geovanna Diego, PT, DPT  2/10/2025

## 2025-02-10 NOTE — PATIENT INSTRUCTIONS
If you need a band for rows and straight arm pull-downs, please feel free to drop by the clinic!   Thanks! Let me know if you have any questions.

## 2025-02-17 ENCOUNTER — CLINICAL SUPPORT (OUTPATIENT)
Dept: REHABILITATION | Facility: HOSPITAL | Age: 60
End: 2025-02-17
Payer: MEDICAID

## 2025-02-17 ENCOUNTER — DOCUMENTATION ONLY (OUTPATIENT)
Dept: REHABILITATION | Facility: HOSPITAL | Age: 60
End: 2025-02-17
Payer: MEDICAID

## 2025-02-17 DIAGNOSIS — S29.012A STRAIN OF LEFT RHOMBOID MUSCLE: ICD-10-CM

## 2025-02-17 DIAGNOSIS — M25.512 LEFT SHOULDER PAIN, UNSPECIFIED CHRONICITY: Primary | ICD-10-CM

## 2025-02-17 PROCEDURE — 97112 NEUROMUSCULAR REEDUCATION: CPT | Mod: PN

## 2025-02-17 NOTE — PROGRESS NOTES
Outpatient Rehab    Physical Therapy Visit    Patient Name: Hodan Hair  MRN: [id number]  YOB: 1965  Today's Date: 2/17/2025    Therapy Diagnosis:   Encounter Diagnoses   Name Primary?    Left shoulder pain, unspecified chronicity Yes    Strain of left rhomboid muscle      Physician: Alfonso Sarmiento, *      Physician Orders: Eval and Treat  Medical Diagnosis:   Diagnosis   M25.512 (ICD-10-CM) - Left shoulder pain, unspecified chronicity   S29.012A (ICD-10-CM) - Strain of left rhomboid muscle         Visit # / Visits Authorized:  2/20 +1  Date of Evaluation:  1/31/2025   Insurance Authorization Period: 01/16/2025 to11/12/2025  Plan of Care Certification:  1/31/2025 to 3/28/25 (8 weeks)      Time In: 1300   Time Out: 1400  Total Time: 60   Total Billable Time: 60    FOTO:  Intake Score:  %  Survey Score 1:  %  Survey Score 2:  %         Subjective   feeling better overall..  Pain reported as 0/10.      Objective            Treatment:  Therapeutic Exercise-- *Does have issues with wrists*  Therapeutic Exercise Activity 1: Quadruped thoracic rotations 3x10 reps, 5'' hold  Therapeutic Exercise Activity 2: Dowel flexion seated 3x10 reps 5'' hold         Balance/Neuromuscular Re-Education  Balance/Neuromuscular Re-Education Activity 1: Prone scapular retractions with shoulder extensions 3x10 reps, 5'' hold  Balance/Neuromuscular Re-Education Activity 2: Prone T's 3x10 reps, 5'' hold  Balance/Neuromuscular Re-Education Activity 3: Prone Y's 3x10 reps, 5'' hold  Balance/Neuromuscular Re-Education Activity 4:  press with 2# dumbbells 3x10 reps   5'' hold  Balance/Neuromuscular Re-Education Activity 5: Rows green band 3x10 reps, 3'' hold 5'' hold  Balance/Neuromuscular Re-Education Activity 6: Straight arm pull-down 3x10 reps, 5'' hold  Balance/Neuromuscular Re-Education Activity 7: Wall slides with shoulder shrug at top 3x10 reps, 5'' hold  Balance/Neuromuscular Re-Education Activity 8:  Incline* push-up 2x10 reps         Assessment & Plan   Assessment: Patient reports no pain today. Good tolerance of today's session with progressions. Continuing to address periscapular strength, thoracic mobility and thoracic erector spinae strength. Continue POC.  Evaluation/Treatment Tolerance: Patient tolerated treatment well    Patient will continue to benefit from skilled outpatient physical therapy to address the deficits listed in the problem list box on initial evaluation, provide pt/family education and to maximize pt's level of independence in the home and community environment.     Patient's spiritual, cultural, and educational needs considered and patient agreeable to plan of care and goals.           Plan: periscapular strengthening, thoracic erector spinae strengthening, thoracic mobility. proper ergonomics.    Goals:   Active       long term goals       Patient will improve periscapular strength to >=4+/5 to denote improved postural control. (Progressing)       Start:  02/03/25    Expected End:  03/31/25            Patient will demonstrate equal active range of motion in left to that of her right upper extremity. (Progressing)       Start:  02/03/25    Expected End:  03/31/25            Patient will improve FOTO score by 10% or more to denote improvement in functional mobility and pain level. (Progressing)       Start:  02/03/25    Expected End:  03/31/25            Patient will report </=1/10 pain level when weight-bearing through left elbow on desk and/or when performing functional tasks during the week. (Progressing)       Start:  02/03/25    Expected End:  03/31/25               short term goals       Patient will improve strength in periscapular muscles (LT, MT, rhomb) by 1/2 Manual muscle grade score to promote improved postural control. (Progressing)       Start:  02/03/25    Expected End:  03/03/25            Patient will improve active range of motion of the left shoulder by 10 degrees or  more (flexion, abduction) to promote improved degrees of freedom in the left shoulder. (Progressing)       Start:  02/03/25    Expected End:  03/03/25                Geovanna Diego, PT, DPT  2/17/2025

## 2025-02-17 NOTE — PROGRESS NOTES
Physical therapist and physical therapy assistant(s) met face to face to discuss patient's treatment plan and progress towards established goals. Pt will be seen by a physical therapist minimally every 6th visit or every 30 days.    Naima Sanders, PTA  2/17/2025

## 2025-02-18 DIAGNOSIS — E11.9 TYPE 2 DIABETES MELLITUS WITHOUT COMPLICATION: ICD-10-CM

## 2025-02-24 ENCOUNTER — CLINICAL SUPPORT (OUTPATIENT)
Dept: REHABILITATION | Facility: HOSPITAL | Age: 60
End: 2025-02-24
Payer: MEDICAID

## 2025-02-24 DIAGNOSIS — S29.012A STRAIN OF LEFT RHOMBOID MUSCLE: ICD-10-CM

## 2025-02-24 DIAGNOSIS — M25.512 LEFT SHOULDER PAIN, UNSPECIFIED CHRONICITY: Primary | ICD-10-CM

## 2025-02-24 PROCEDURE — 97112 NEUROMUSCULAR REEDUCATION: CPT | Mod: PN

## 2025-02-24 NOTE — PROGRESS NOTES
Outpatient Rehab    Physical Therapy Visit    Patient Name: Hodan Hair  MRN: [id number]  YOB: 1965  Encounter Date: 2/24/2025    Therapy Diagnosis:   Encounter Diagnoses   Name Primary?    Left shoulder pain, unspecified chronicity Yes    Strain of left rhomboid muscle      Physician: Alfonso Sarmiento, *      Physician Orders: Eval and Treat  Medical Diagnosis:   Diagnosis   M25.512 (ICD-10-CM) - Left shoulder pain, unspecified chronicity   S29.012A (ICD-10-CM) - Strain of left rhomboid muscle         Visit # / Visits Authorized:  1 / 1   Date of Evaluation:  1/31/2025   Insurance Authorization Period: 01/16/2025 to11/12/2025  Plan of Care Certification:  1/31/2025 to 3/28/25 (8 weeks)      Time In:     Time Out:    Total Time:     Total Billable Time: 60    FOTO:  Intake Score:  %  Survey Score 1:  %  Survey Score 2:  %         Subjective   no increased pain noted today in left shoulder..  Pain reported as 1/10.      Objective            Treatment:  Therapeutic Exercise  Therapeutic Exercise Activity 1: Quadruped thoracic rotations 3x10 reps, 5'' hold  Therapeutic Exercise Activity 2: Dowel flexion seated 3x10 reps 5'' hold         Balance/Neuromuscular Re-Education  Balance/Neuromuscular Re-Education Activity 1: Prone scapular retractions with shoulder extensions 3x10 reps, 5'' hold 1#  Balance/Neuromuscular Re-Education Activity 2: Prone T's 3x10 reps, 5'' hold 1#  Balance/Neuromuscular Re-Education Activity 3: Prone Y's 3x10 reps, 5'' hold 1#  Balance/Neuromuscular Re-Education Activity 4:  press with 3# dumbbells 2x10 reps   5'' hold  Balance/Neuromuscular Re-Education Activity 5: Rows blue band 3x10 reps, 3'' hold 5'' hold  Balance/Neuromuscular Re-Education Activity 6: Straight arm pull-down 3x10 reps, 5'' hold blue band  Balance/Neuromuscular Re-Education Activity 7: Wall slides with shoulder shrug at top 3x10 reps, 5'' hold  Balance/Neuromuscular Re-Education Activity 8:  Incline* push-up 3x10 reps    Assessment & Plan   Assessment: Patient seems to be improving with no flare-ups of pain. Able to make it through a day without a pain spike.  Good tolerance of progressions that focus on periscapular strength, thoracic mobility and thoracic erector spinae strength. Continue POC.  Evaluation/Treatment Tolerance: Patient tolerated treatment well    Patient will continue to benefit from skilled outpatient physical therapy to address the deficits listed in the problem list box on initial evaluation, provide pt/family education and to maximize pt's level of independence in the home and community environment.     Patient's spiritual, cultural, and educational needs considered and patient agreeable to plan of care and goals.           Plan:      Goals:   Active       long term goals       Patient will improve periscapular strength to >=4+/5 to denote improved postural control. (Progressing)       Start:  02/03/25    Expected End:  03/31/25            Patient will demonstrate equal active range of motion in left to that of her right upper extremity. (Progressing)       Start:  02/03/25    Expected End:  03/31/25            Patient will improve FOTO score by 10% or more to denote improvement in functional mobility and pain level. (Progressing)       Start:  02/03/25    Expected End:  03/31/25            Patient will report </=1/10 pain level when weight-bearing through left elbow on desk and/or when performing functional tasks during the week. (Progressing)       Start:  02/03/25    Expected End:  03/31/25               short term goals       Patient will improve strength in periscapular muscles (LT, MT, rhomb) by 1/2 Manual muscle grade score to promote improved postural control. (Progressing)       Start:  02/03/25    Expected End:  03/03/25            Patient will improve active range of motion of the left shoulder by 10 degrees or more (flexion, abduction) to promote improved degrees of  freedom in the left shoulder. (Progressing)       Start:  02/03/25    Expected End:  03/03/25                Geovanna Diego PT, DPT

## 2025-03-10 ENCOUNTER — CLINICAL SUPPORT (OUTPATIENT)
Dept: REHABILITATION | Facility: HOSPITAL | Age: 60
End: 2025-03-10
Payer: MEDICAID

## 2025-03-10 DIAGNOSIS — M25.512 LEFT SHOULDER PAIN, UNSPECIFIED CHRONICITY: Primary | ICD-10-CM

## 2025-03-10 DIAGNOSIS — S29.012A STRAIN OF LEFT RHOMBOID MUSCLE: ICD-10-CM

## 2025-03-10 PROCEDURE — 97110 THERAPEUTIC EXERCISES: CPT | Mod: PN

## 2025-03-10 NOTE — PROGRESS NOTES
Outpatient Rehab    Physical Therapy Visit/ progress note    Patient Name: Hodan Hair  MRN: [id number]  YOB: 1965  Encounter Date: 3/10/2025    Therapy Diagnosis:   Encounter Diagnoses   Name Primary?    Left shoulder pain, unspecified chronicity Yes    Strain of left rhomboid muscle      Physician: Alfonso Sarmiento, *    Physician Orders: Eval and Treat  Medical Diagnosis:   Diagnosis   M25.512 (ICD-10-CM) - Left shoulder pain, unspecified chronicity   S29.012A (ICD-10-CM) - Strain of left rhomboid muscle         Visit # / Visits Authorized:  4/20 +1   Date of Evaluation:  1/31/2025   Insurance Authorization Period: 01/16/2025 to11/12/2025  Plan of Care Certification:  1/31/2025 to 3/28/25 (8 weeks)                    Time In: 1300   Time Out: 1400  Total Time: 60   Total Billable Time: 60    FOTO:  Intake Score: 65  %  Survey Score 1: 67  % (3/10/25)  Survey Score 2:  %         Subjective   feels like she has a scratch on her back and that's where the pain is. Heavy lifting over the weekend and lots of use of arms.  Pain reported as 5/10.      Objective      Shoulder Range of Motion  Left Shoulder   Active (deg) Passive (deg) Pain   Flexion 160       Extension         Scaption         ABduction 180       ADduction         Horizontal ABduction         Horizontal ADduction         External Rotation (Shoulder ABducted 0 degrees)         External Rotation (Shoulder ABducted 45 degrees)         External Rotation (Shoulder ABducted 90 degrees) 65       Internal Rotation (Shoulder ABducted 0 degrees)         Internal Rotation (Shoulder ABducted 45 degrees)         Internal Rotation (Shoulder ABducted 90 degrees) 90                     Cervical/Thoracic Strength Details  Left shoulder rhomboids  5/5; right shoulder 5/5;  Middle trapezius LEFT 4+/5!; RIGHT 5/5. Lower trapezius LEFT 4-/5, RIGHT 4+/5                  Treatment:            Balance/Neuromuscular Re-Education  Balance/Neuromuscular  "Re-Education Activity 1: Prone scapular retractions with shoulder extensions 2x10 reps, 5'' hold 2#  Balance/Neuromuscular Re-Education Activity 2: Prone T's 2x10 reps, 5'' hold 2#  Balance/Neuromuscular Re-Education Activity 3: Prone Y's 3x10 reps, 5'' hold 1#  Balance/Neuromuscular Re-Education Activity 4:  press with 4# dumbbells 2x10 reps   5'' hold  Balance/Neuromuscular Re-Education Activity 5: Rows blue band 3x10 reps, 3'' hold 5'' hold  Balance/Neuromuscular Re-Education Activity 6: Straight arm pull-down 3x10 reps, 5'' hold blue band  Balance/Neuromuscular Re-Education Activity 7: +Shoulder flexion ("V") red band 2x10 reps  Balance/Neuromuscular Re-Education Activity 8: +Wall plank to push-ups 2x10 reps (start R, Start L)         Assessment & Plan   Assessment: Patient presents to therapy with moderate pain regarding left rhomboid strain, 2/2 lifting heavy dog food over the weeknd. Improving overall, as parascapular strength and shoulder AROM has improved since evaluation. Will finish the month with therapy and reassess at that point in time. Continue POC addressing rhomboid eccentric/concentric strength and postural awareness.  Evaluation/Treatment Tolerance: Patient tolerated treatment well    Patient will continue to benefit from skilled outpatient physical therapy to address the deficits listed in the problem list box on initial evaluation, provide pt/family education and to maximize pt's level of independence in the home and community environment.     Patient's spiritual, cultural, and educational needs considered and patient agreeable to plan of care and goals.           Plan: eccentric-concentric strength rhomboid (L), parascapular strengthening.    Goals:   Active       long term goals       Patient will improve periscapular strength to >=4+/5 to denote improved postural control. (Progressing)       Start:  02/03/25    Expected End:  03/31/25            Patient will demonstrate equal active " range of motion in left to that of her right upper extremity. (Met)       Start:  02/03/25    Expected End:  03/31/25    Resolved:  03/10/25         Patient will improve FOTO score by 10% or more to denote improvement in functional mobility and pain level. (Progressing)       Start:  02/03/25    Expected End:  03/31/25            Patient will report </=1/10 pain level when weight-bearing through left elbow on desk and/or when performing functional tasks during the week. (Progressing)       Start:  02/03/25    Expected End:  03/31/25              Resolved       short term goals       Patient will improve strength in periscapular muscles (LT, MT, rhomb) by 1/2 Manual muscle grade score to promote improved postural control. (Met)       Start:  02/03/25    Expected End:  03/03/25    Resolved:  03/10/25         Patient will improve active range of motion of the left shoulder by 10 degrees or more (flexion, abduction) to promote improved degrees of freedom in the left shoulder. (Met)       Start:  02/03/25    Expected End:  03/03/25    Resolved:  03/10/25             Geovanna Diego, PT, DPT  3/10/2025

## 2025-03-17 ENCOUNTER — CLINICAL SUPPORT (OUTPATIENT)
Dept: REHABILITATION | Facility: HOSPITAL | Age: 60
End: 2025-03-17
Payer: MEDICAID

## 2025-03-17 DIAGNOSIS — S29.012A STRAIN OF LEFT RHOMBOID MUSCLE: ICD-10-CM

## 2025-03-17 DIAGNOSIS — M25.512 LEFT SHOULDER PAIN, UNSPECIFIED CHRONICITY: Primary | ICD-10-CM

## 2025-03-17 PROCEDURE — 97110 THERAPEUTIC EXERCISES: CPT | Mod: PN

## 2025-03-17 NOTE — PROGRESS NOTES
"    Outpatient Rehab    Physical Therapy Visit    Patient Name: Hodan Hair  MRN: [id number]  YOB: 1965  Encounter Date: 3/17/2025    Therapy Diagnosis:   Encounter Diagnoses   Name Primary?    Left shoulder pain, unspecified chronicity Yes    Strain of left rhomboid muscle      Physician: Alfonso Sarmiento, *    Physician Orders: Eval and Treat  Medical Diagnosis: Left shoulder pain, unspecified chronicity  Strain of left rhomboid muscle    Physician Orders: Eval and Treat  Medical Diagnosis:   Diagnosis   M25.512 (ICD-10-CM) - Left shoulder pain, unspecified chronicity   S29.012A (ICD-10-CM) - Strain of left rhomboid muscle         Visit # / Visits Authorized:  5/20  Date of Evaluation:  1/31/2025   Insurance Authorization Period: 01/16/2025 to11/12/2025  Plan of Care Certification:  1/31/2025 to 3/28/25 (8 weeks)                    PT/PTA:     Number of PTA visits since last PT visit:   Time In: 1300   Time Out: 1353  Total Time: 53   Total Billable Time: 53    FOTO:  Intake Score:  %  Survey Score 1:  %  Survey Score 2:  %    Subjective   did her hair over the weekend. Slight pain. Takes advil regularly..  Pain reported as 0/10.      Objective            Treatment:  Balance/Neuromuscular Re-Education  NMR 1: Prone scapular retractions with shoulder extensions 3x10 reps, 5'' hold 2#  NMR 2: Prone T's 3x10 reps, 5'' hold  NMR 3: Prone Y's 3x10 reps, 5'' hold  NMR 4:  press with 10# barbell 2x10 reps   5'' hold  NMR 5: Rows FreeMotion 3# each; 2x10 reps, 3'' hold  NMR 6: Straight arm pull-down FreeMotion 3# each; 2x10 reps, 3'' hold  NMR 7: Shoulder flexion ("V") red band 3x10 reps  NMR 8: Wall plank to push-ups 3*x10 reps (start R, Start L)    Time Entry(in minutes):  Therapeutic Exercise Time Entry: 53    Assessment & Plan   Assessment: Patient presents to therapy with light pain regarding left rhomboid strain, 2/2 styling her hair over the weekend. Improving overall, discharge " planning. Continue POC addressing rhomboid eccentric/concentric strength and postural awareness.  Evaluation/Treatment Tolerance: Patient tolerated treatment well    Patient will continue to benefit from skilled outpatient physical therapy to address the deficits listed in the problem list box on initial evaluation, provide pt/family education and to maximize pt's level of independence in the home and community environment.     Patient's spiritual, cultural, and educational needs considered and patient agreeable to plan of care and goals.           Plan: eccentric-concentric strength rhomboid (L), parascapular strengthening.    Goals:   Active       long term goals       Patient will improve periscapular strength to >=4+/5 to denote improved postural control. (Progressing)       Start:  02/03/25    Expected End:  03/31/25            Patient will demonstrate equal active range of motion in left to that of her right upper extremity. (Met)       Start:  02/03/25    Expected End:  03/31/25    Resolved:  03/10/25         Patient will improve FOTO score by 10% or more to denote improvement in functional mobility and pain level. (Progressing)       Start:  02/03/25    Expected End:  03/31/25            Patient will report </=1/10 pain level when weight-bearing through left elbow on desk and/or when performing functional tasks during the week. (Progressing)       Start:  02/03/25    Expected End:  03/31/25              Resolved       short term goals       Patient will improve strength in periscapular muscles (LT, MT, rhomb) by 1/2 Manual muscle grade score to promote improved postural control. (Met)       Start:  02/03/25    Expected End:  03/03/25    Resolved:  03/10/25         Patient will improve active range of motion of the left shoulder by 10 degrees or more (flexion, abduction) to promote improved degrees of freedom in the left shoulder. (Met)       Start:  02/03/25    Expected End:  03/03/25    Resolved:  03/10/25              Geovanna Diego, PT, DPT  3/17/2025

## 2025-03-28 ENCOUNTER — CLINICAL SUPPORT (OUTPATIENT)
Dept: REHABILITATION | Facility: HOSPITAL | Age: 60
End: 2025-03-28
Payer: MEDICAID

## 2025-03-28 DIAGNOSIS — S29.012A STRAIN OF LEFT RHOMBOID MUSCLE: ICD-10-CM

## 2025-03-28 DIAGNOSIS — M25.512 LEFT SHOULDER PAIN, UNSPECIFIED CHRONICITY: Primary | ICD-10-CM

## 2025-03-28 PROCEDURE — 97110 THERAPEUTIC EXERCISES: CPT | Mod: PN

## 2025-03-28 NOTE — PROGRESS NOTES
Outpatient Rehab    Physical Therapy Visit/Discharge note.    Patient Name: Hodan Hair  MRN: [id number]  YOB: 1965  Encounter Date: 3/28/2025    Therapy Diagnosis:   Encounter Diagnoses   Name Primary?    Left shoulder pain, unspecified chronicity Yes    Strain of left rhomboid muscle      Physician: Alfonso Sarmiento, *    Physician Orders: Eval and Treat  Medical Diagnosis: Left shoulder pain, unspecified chronicity  Strain of left rhomboid muscle    Visit # / Visits Authorized:  6 / 20+1  Date of Evaluation:  1/31/2025   Insurance Authorization Period: 01/16/2025 to11/12/2025  Plan of Care Certification:  1/31/2025 to 3/28/25 (8 weeks)      PT/PTA:     Number of PTA visits since last PT visit:   Time In: 1230   Time Out: 1300  Total Time: 30  Total Billable Time:  30    FOTO:  Intake Score:  %  Survey Score 1:  %  Survey Score 2:  %         Subjective   Doing well since she had her granddaughter with her for 4 days..  Pain reported as 0/10.      Objective                Middle trap (L): 4+/5; rhomboids (L): 4+/5; lower trap(L)4/5.    Middle trap (R): 5/5; rhomboids (R): 5/5; lower trap(R)5/5.    Treatment:  Therapeutic Activity  TA 1: HEP final reviewed and sent to patient. Questions answered.    Time Entry(in minutes):  Therapeutic Activity Time Entry: 30    Assessment & Plan   Assessment: Pt has met all of her goals and no longer having high tissue irritability in her left rhomboid with heavy lifting or overhead motion of left shoulder. FOTO shows 94% function. HEP updated, questions answered. She is discharged from PT services.  Evaluation/Treatment Tolerance: Patient tolerated treatment well    Patient will continue to benefit from skilled outpatient physical therapy to address the deficits listed in the problem list box on initial evaluation, provide pt/family education and to maximize pt's level of independence in the home and community environment.     Patient's spiritual, cultural,  and educational needs considered and patient agreeable to plan of care and goals.           Plan: discharged.    Goals:   Resolved       long term goals       Patient will improve periscapular strength to >=4+/5 to denote improved postural control. (Met)       Start:  02/03/25    Expected End:  03/31/25    Resolved:  03/28/25         Patient will demonstrate equal active range of motion in left to that of her right upper extremity. (Met)       Start:  02/03/25    Expected End:  03/31/25    Resolved:  03/10/25         Patient will improve FOTO score by 10% or more to denote improvement in functional mobility and pain level. (Met)       Start:  02/03/25    Expected End:  03/31/25    Resolved:  03/28/25         Patient will report </=1/10 pain level when weight-bearing through left elbow on desk and/or when performing functional tasks during the week. (Met)       Start:  02/03/25    Expected End:  03/31/25    Resolved:  03/28/25            short term goals       Patient will improve strength in periscapular muscles (LT, MT, rhomb) by 1/2 Manual muscle grade score to promote improved postural control. (Met)       Start:  02/03/25    Expected End:  03/03/25    Resolved:  03/10/25         Patient will improve active range of motion of the left shoulder by 10 degrees or more (flexion, abduction) to promote improved degrees of freedom in the left shoulder. (Met)       Start:  02/03/25    Expected End:  03/03/25    Resolved:  03/10/25             Geovanna Diego, PT, DPT  3/28/2025

## 2025-03-28 NOTE — PATIENT INSTRUCTIONS
Access Code: [email or web address]  URL: [email or web address]  Date: 03/28/2025  Prepared by: Geovanna Diego    Program Notes   press- 10 lbs. Prone shoulder extensions 2 lb dumbbells.    Exercises  - Prone Shoulder Horizontal Abduction with Thumbs Up  - 1 x daily - 5 x weekly - 3 sets - 10 reps - 5 hold  - Prone Scapular Retraction Y  - 1 x daily - 5 x weekly - 3 sets - 10 reps - 5 hold  - Prone Scapular Slide with Shoulder Extension  - 1 x daily - 5 x weekly - 3 sets - 10 reps - 5 hold  - Standing Shoulder Press with Barbell  - 1 x daily - 5 x weekly - 3 sets - 10 reps - 5 hold  - Standing Shoulder Row with Anchored Resistance  - 1 x daily - 5 x weekly - 3 sets - 10 reps - 5 hold  - Shoulder extension with resistance - Neutral  - 1 x daily - 5 x weekly - 3 sets - 10 reps - 5 hold  - Standing Shoulder Flexion with Resistance  - 1 x daily - 5 x weekly - 3 sets - 10 reps - 5 hold  - Standing Plank on Wall  - 1 x daily - 5 x weekly - 3 sets - 10 reps - 5 hold  - Cervical Retraction with Resistance  - 1 x daily - 5 x weekly - 3 sets - 10 reps - 5 hold

## 2025-04-22 ENCOUNTER — PATIENT MESSAGE (OUTPATIENT)
Dept: ADMINISTRATIVE | Facility: HOSPITAL | Age: 60
End: 2025-04-22
Payer: MEDICAID

## 2025-04-22 ENCOUNTER — PATIENT MESSAGE (OUTPATIENT)
Dept: OBSTETRICS AND GYNECOLOGY | Facility: CLINIC | Age: 60
End: 2025-04-22
Payer: MEDICAID

## 2025-04-23 DIAGNOSIS — Z12.11 SCREENING FOR COLON CANCER: ICD-10-CM

## 2025-05-06 ENCOUNTER — LAB VISIT (OUTPATIENT)
Dept: LAB | Facility: HOSPITAL | Age: 60
End: 2025-05-06
Attending: INTERNAL MEDICINE
Payer: MEDICAID

## 2025-05-06 DIAGNOSIS — N18.31 STAGE 3A CHRONIC KIDNEY DISEASE: ICD-10-CM

## 2025-05-06 DIAGNOSIS — E78.49 OTHER HYPERLIPIDEMIA: ICD-10-CM

## 2025-05-06 DIAGNOSIS — I10 ESSENTIAL HYPERTENSION: ICD-10-CM

## 2025-05-06 DIAGNOSIS — E11.9 TYPE 2 DIABETES MELLITUS WITHOUT COMPLICATION, WITHOUT LONG-TERM CURRENT USE OF INSULIN: ICD-10-CM

## 2025-05-06 LAB
ABSOLUTE EOSINOPHIL (OHS): 0.27 K/UL
ABSOLUTE MONOCYTE (OHS): 0.63 K/UL (ref 0.3–1)
ABSOLUTE NEUTROPHIL COUNT (OHS): 4.82 K/UL (ref 1.8–7.7)
ALBUMIN SERPL BCP-MCNC: 3.5 G/DL (ref 3.5–5.2)
ALP SERPL-CCNC: 104 UNIT/L (ref 40–150)
ALT SERPL W/O P-5'-P-CCNC: 18 UNIT/L (ref 10–44)
ANION GAP (OHS): 8 MMOL/L (ref 8–16)
AST SERPL-CCNC: 22 UNIT/L (ref 11–45)
BASOPHILS # BLD AUTO: 0.04 K/UL
BASOPHILS NFR BLD AUTO: 0.5 %
BILIRUB SERPL-MCNC: 0.3 MG/DL (ref 0.1–1)
BUN SERPL-MCNC: 19 MG/DL (ref 6–20)
CALCIUM SERPL-MCNC: 9.1 MG/DL (ref 8.7–10.5)
CHLORIDE SERPL-SCNC: 104 MMOL/L (ref 95–110)
CHOLEST SERPL-MCNC: 181 MG/DL (ref 120–199)
CHOLEST/HDLC SERPL: 3.9 {RATIO} (ref 2–5)
CO2 SERPL-SCNC: 26 MMOL/L (ref 23–29)
CREAT SERPL-MCNC: 1.2 MG/DL (ref 0.5–1.4)
ERYTHROCYTE [DISTWIDTH] IN BLOOD BY AUTOMATED COUNT: 13.8 % (ref 11.5–14.5)
GFR SERPLBLD CREATININE-BSD FMLA CKD-EPI: 52 ML/MIN/1.73/M2
GLUCOSE SERPL-MCNC: 89 MG/DL (ref 70–110)
HCT VFR BLD AUTO: 34.5 % (ref 37–48.5)
HDLC SERPL-MCNC: 46 MG/DL (ref 40–75)
HDLC SERPL: 25.4 % (ref 20–50)
HGB BLD-MCNC: 10.4 GM/DL (ref 12–16)
IMM GRANULOCYTES # BLD AUTO: 0.02 K/UL (ref 0–0.04)
IMM GRANULOCYTES NFR BLD AUTO: 0.2 % (ref 0–0.5)
LDLC SERPL CALC-MCNC: 84.2 MG/DL (ref 63–159)
LYMPHOCYTES # BLD AUTO: 2.34 K/UL (ref 1–4.8)
MCH RBC QN AUTO: 25.5 PG (ref 27–31)
MCHC RBC AUTO-ENTMCNC: 30.1 G/DL (ref 32–36)
MCV RBC AUTO: 85 FL (ref 82–98)
NONHDLC SERPL-MCNC: 135 MG/DL
NUCLEATED RBC (/100WBC) (OHS): 0 /100 WBC
PLATELET # BLD AUTO: 334 K/UL (ref 150–450)
PMV BLD AUTO: 8.8 FL (ref 9.2–12.9)
POTASSIUM SERPL-SCNC: 3.9 MMOL/L (ref 3.5–5.1)
PROT SERPL-MCNC: 7.3 GM/DL (ref 6–8.4)
RBC # BLD AUTO: 4.08 M/UL (ref 4–5.4)
RELATIVE EOSINOPHIL (OHS): 3.3 %
RELATIVE LYMPHOCYTE (OHS): 28.8 % (ref 18–48)
RELATIVE MONOCYTE (OHS): 7.8 % (ref 4–15)
RELATIVE NEUTROPHIL (OHS): 59.4 % (ref 38–73)
SODIUM SERPL-SCNC: 138 MMOL/L (ref 136–145)
TRIGL SERPL-MCNC: 254 MG/DL (ref 30–150)
TSH SERPL-ACNC: 0.97 UIU/ML (ref 0.4–4)
WBC # BLD AUTO: 8.12 K/UL (ref 3.9–12.7)

## 2025-05-06 PROCEDURE — 85025 COMPLETE CBC W/AUTO DIFF WBC: CPT

## 2025-05-06 PROCEDURE — 84443 ASSAY THYROID STIM HORMONE: CPT

## 2025-05-06 PROCEDURE — 80053 COMPREHEN METABOLIC PANEL: CPT

## 2025-05-06 PROCEDURE — 36415 COLL VENOUS BLD VENIPUNCTURE: CPT | Mod: PO

## 2025-05-06 PROCEDURE — 80061 LIPID PANEL: CPT

## 2025-05-07 ENCOUNTER — OFFICE VISIT (OUTPATIENT)
Dept: OBSTETRICS AND GYNECOLOGY | Facility: CLINIC | Age: 60
End: 2025-05-07
Payer: MEDICAID

## 2025-05-07 VITALS
WEIGHT: 207.25 LBS | SYSTOLIC BLOOD PRESSURE: 122 MMHG | BODY MASS INDEX: 34.49 KG/M2 | DIASTOLIC BLOOD PRESSURE: 78 MMHG

## 2025-05-07 DIAGNOSIS — N95.1 MENOPAUSAL SYMPTOMS: ICD-10-CM

## 2025-05-07 DIAGNOSIS — Z13.820 SCREENING FOR OSTEOPOROSIS: Primary | ICD-10-CM

## 2025-05-07 DIAGNOSIS — Z91.89 AT RISK FOR CARDIOVASCULAR EVENT: ICD-10-CM

## 2025-05-07 DIAGNOSIS — Z13.6 ENCOUNTER FOR SCREENING FOR CARDIOVASCULAR DISORDERS: ICD-10-CM

## 2025-05-07 PROCEDURE — 3008F BODY MASS INDEX DOCD: CPT | Mod: CPTII,,, | Performed by: NURSE PRACTITIONER

## 2025-05-07 PROCEDURE — 3066F NEPHROPATHY DOC TX: CPT | Mod: CPTII,,, | Performed by: NURSE PRACTITIONER

## 2025-05-07 PROCEDURE — 99213 OFFICE O/P EST LOW 20 MIN: CPT | Mod: PBBFAC | Performed by: NURSE PRACTITIONER

## 2025-05-07 PROCEDURE — 99999 PR PBB SHADOW E&M-EST. PATIENT-LVL III: CPT | Mod: PBBFAC,,, | Performed by: NURSE PRACTITIONER

## 2025-05-07 PROCEDURE — 3078F DIAST BP <80 MM HG: CPT | Mod: CPTII,,, | Performed by: NURSE PRACTITIONER

## 2025-05-07 PROCEDURE — 99214 OFFICE O/P EST MOD 30 MIN: CPT | Mod: S$PBB,,, | Performed by: NURSE PRACTITIONER

## 2025-05-07 PROCEDURE — 1159F MED LIST DOCD IN RCRD: CPT | Mod: CPTII,,, | Performed by: NURSE PRACTITIONER

## 2025-05-07 PROCEDURE — 3061F NEG MICROALBUMINURIA REV: CPT | Mod: CPTII,,, | Performed by: NURSE PRACTITIONER

## 2025-05-07 PROCEDURE — 3074F SYST BP LT 130 MM HG: CPT | Mod: CPTII,,, | Performed by: NURSE PRACTITIONER

## 2025-05-07 PROCEDURE — 1160F RVW MEDS BY RX/DR IN RCRD: CPT | Mod: CPTII,,, | Performed by: NURSE PRACTITIONER

## 2025-05-07 NOTE — PROGRESS NOTES
Subjective:      Hodan Hair is a 59 y.o. female who presents to discuss hormone replacement therapy. She is SP ablation with activation of menopause in the past 8 years.  Patient is requesting hormone replacement therapy due to insomnia, mental fogginess, and joint pain.The patient is not taking hormone replacement therapy. Patient denies post-menopausal vaginal bleeding.  She denies the following contraindications to HRT:  Vaginal bleeding, history of VTE/PE, thrombophilia,  breast cancer, or active liver disease.       History of T2DM, HLD, HTN.    The 10-year ASCVD risk score (Elda CHAPARRO, et al., 2019) is: 7.2%    Values used to calculate the score:      Age: 59 years      Sex: Female      Is Non- : No      Diabetic: Yes      Tobacco smoker: No      Systolic Blood Pressure: 122 mmHg      Is BP treated: Yes      HDL Cholesterol: 46 mg/dL      Total Cholesterol: 181 mg/dL       Hemoglobin A1C   Date Value Ref Range Status   11/19/2024 5.6 4.0 - 5.6 % Final     Comment:     ADA Screening Guidelines:  5.7-6.4%  Consistent with prediabetes  >or=6.5%  Consistent with diabetes    High levels of fetal hemoglobin interfere with the HbA1C  assay. Heterozygous hemoglobin variants (HbS, HgC, etc)do  not significantly interfere with this assay.   However, presence of multiple variants may affect accuracy.     06/25/2024 6.4 (H) 4.0 - 5.6 % Final     Comment:     ADA Screening Guidelines:  5.7-6.4%  Consistent with prediabetes  >or=6.5%  Consistent with diabetes    High levels of fetal hemoglobin interfere with the HbA1C  assay. Heterozygous hemoglobin variants (HbS, HgC, etc)do  not significantly interfere with this assay.   However, presence of multiple variants may affect accuracy.     02/16/2024 7.4 (H) 4.0 - 5.6 % Final     Comment:     ADA Screening Guidelines:  5.7-6.4%  Consistent with prediabetes  >or=6.5%  Consistent with diabetes    High levels of fetal hemoglobin interfere with the  HbA1C  assay. Heterozygous hemoglobin variants (HbS, HgC, etc)do  not significantly interfere with this assay.   However, presence of multiple variants may affect accuracy.          Lab Visit on 05/06/2025   Component Date Value Ref Range Status    Urine Microalbumin 05/06/2025 41.0    ug/mL Final    Urine Creatinine 05/06/2025 254.0  15.0 - 325.0 mg/dL Final    Microalbumin/Creatinine Ratio Urine 05/06/2025 16.1  <=30.0 ug/mg Final   Lab Visit on 05/06/2025   Component Date Value Ref Range Status    Sodium 05/06/2025 138  136 - 145 mmol/L Final    Potassium 05/06/2025 3.9  3.5 - 5.1 mmol/L Final    Chloride 05/06/2025 104  95 - 110 mmol/L Final    CO2 05/06/2025 26  23 - 29 mmol/L Final    Glucose 05/06/2025 89  70 - 110 mg/dL Final    BUN 05/06/2025 19  6 - 20 mg/dL Final    Creatinine 05/06/2025 1.2  0.5 - 1.4 mg/dL Final    Calcium 05/06/2025 9.1  8.7 - 10.5 mg/dL Final    Protein Total 05/06/2025 7.3  6.0 - 8.4 gm/dL Final    Albumin 05/06/2025 3.5  3.5 - 5.2 g/dL Final    Bilirubin Total 05/06/2025 0.3  0.1 - 1.0 mg/dL Final    ALP 05/06/2025 104  40 - 150 unit/L Final    AST 05/06/2025 22  11 - 45 unit/L Final    ALT 05/06/2025 18  10 - 44 unit/L Final    Anion Gap 05/06/2025 8  8 - 16 mmol/L Final    eGFR 05/06/2025 52 (L)  >60 mL/min/1.73/m2 Final    Cholesterol Total 05/06/2025 181  120 - 199 mg/dL Final    Triglyceride 05/06/2025 254 (H)  30 - 150 mg/dL Final    HDL Cholesterol 05/06/2025 46  40 - 75 mg/dL Final    LDL Cholesterol 05/06/2025 84.2  63.0 - 159.0 mg/dL Final    HDL/Cholesterol Ratio 05/06/2025 25.4  20.0 - 50.0 % Final    Cholesterol/HDL Ratio 05/06/2025 3.9  2.0 - 5.0 Final    Non HDL Cholesterol 05/06/2025 135  mg/dL Final    TSH 05/06/2025 0.968  0.400 - 4.000 uIU/mL Final    WBC 05/06/2025 8.12  3.90 - 12.70 K/uL Final    RBC 05/06/2025 4.08  4.00 - 5.40 M/uL Final    HGB 05/06/2025 10.4 (L)  12.0 - 16.0 gm/dL Final    HCT 05/06/2025 34.5 (L)  37.0 - 48.5 % Final    MCV 05/06/2025 85  82  - 98 fL Final    MCH 2025 25.5 (L)  27.0 - 31.0 pg Final    MCHC 2025 30.1 (L)  32.0 - 36.0 g/dL Final    RDW 2025 13.8  11.5 - 14.5 % Final    Platelet Count 2025 334  150 - 450 K/uL Final    MPV 2025 8.8 (L)  9.2 - 12.9 fL Final    Nucleated RBC 2025 0  <=0 /100 WBC Final    Neut % 2025 59.4  38 - 73 % Final    Lymph % 2025 28.8  18 - 48 % Final    Mono % 2025 7.8  4 - 15 % Final    Eos % 2025 3.3  <=8 % Final    Basophil % 2025 0.5  <=1.9 % Final    Imm Grans % 2025 0.2  0.0 - 0.5 % Final    Neut # 2025 4.82  1.8 - 7.7 K/uL Final    Lymph # 2025 2.34  1 - 4.8 K/uL Final    Mono # 2025 0.63  0.3 - 1 K/uL Final    Eos # 2025 0.27  <=0.5 K/uL Final    Baso # 2025 0.04  <=0.2 K/uL Final    Imm Grans # 2025 0.02  0.00 - 0.04 K/uL Final       Past Medical History:   Diagnosis Date    ADHD (attention deficit hyperactivity disorder)     Anxiety disorder     Hyperlipidemia     Hypertension     Hypothyroid     Menopause     Type 2 diabetes mellitus without complications      Past Surgical History:   Procedure Laterality Date    BREAST BIOPSY Right 2020    Rare breast ductular elements with adipose    BREAST BIOPSY Right 06/10/2020    Chronically inflamed fibroadipose tissue with rare breast ducts and abundant      SECTION      CHOLECYSTECTOMY      ENDOMETRIAL ABLATION  2016    TRIGGER FINGER RELEASE      WISDOM TOOTH EXTRACTION       Social History[1]  Family History   Problem Relation Name Age of Onset    Diabetes Paternal Grandmother      Diabetes Maternal Grandfather      Kidney disease Father      Breast cancer Mother      Colon cancer Neg Hx      Ovarian cancer Neg Hx       OB History    Para Term  AB Living   2 2 2   2   SAB IAB Ectopic Multiple Live Births       2      # Outcome Date GA Lbr Yuniel/2nd Weight Sex Type Anes PTL Lv   2 Term  40w0d   F CS-Unspec   JOSE   1 Term   40w0d   M CS-Unspec   JOSE     Current Medications[2]    Vitals:    25 1312   BP: 122/78   Weight: 94 kg (207 lb 3.7 oz)     Body mass index is 34.49 kg/m².    Assessment:    There are no diagnoses linked to this encounter.    Plan:   Risks and benefits of hormone replacement therapy were discussed.  Hormone replacement therapy options, including bioidentical versus non-bioidentical hormones, as well as alternatives discussed.    CT Calcium score.    Follow up is pending cardiac assessment.   Will recheck labs once on typical optimal dose or if having side effects.  Instructed patient to call if she experiences any side effects or has any questions.       PATRICIA Mena             [1]   Social History  Tobacco Use    Smoking status: Former     Current packs/day: 0.00     Average packs/day: 1 pack/day for 16.0 years (16.0 ttl pk-yrs)     Types: Cigarettes     Start date: 1981     Quit date: 1997     Years since quittin.4    Smokeless tobacco: Never   Substance Use Topics    Alcohol use: Yes     Comment: very rarely    Drug use: No   [2]   Current Outpatient Medications:     amLODIPine (NORVASC) 5 MG tablet, Take 1 tablet (5 mg total) by mouth every evening., Disp: 90 tablet, Rfl: 3    carvediloL (COREG) 25 MG tablet, TAKE ONE TABLET BY MOUTH TWICE DAILY WITH meals, Disp: 180 tablet, Rfl: 3    EScitalopram oxalate (LEXAPRO) 20 MG tablet, Take 20 mg by mouth. 1 Tablet Oral Every day, Disp: , Rfl:     ezetimibe-simvastatin 10-10 mg (VYTORIN) 10-10 mg per tablet, TAKE ONE TABLET BY MOUTH EVERY EVENING, Disp: 90 tablet, Rfl: 2    levothyroxine (SYNTHROID) 112 MCG tablet, TAKE ONE TABLET BY MOUTH EVERY DAY, Disp: 90 tablet, Rfl: 0    metFORMIN (GLUCOPHAGE) 500 MG tablet, TAKE ONE TABLET BY MOUTH TWICE DAILY with meals, Disp: 180 tablet, Rfl: 1    pantoprazole (PROTONIX) 40 MG tablet, TAKE one tablet BY MOUTH EVERY DAY, Disp: 90 tablet, Rfl: 3    tirzepatide 7.5 mg/0.5 mL PnIj, Inject 7.5 mg into  the skin every 7 days., Disp: 6 mL, Rfl: 3    vit C,J-Fq-tfiuh-lutein-zeaxan (PRESERVISION AREDS 2) 250-90-40-1 mg Cap, , Disp: , Rfl:     VYVANSE 70 mg capsule, Take 70 mg by mouth every morning., Disp: , Rfl:     blood sugar diagnostic Strp, 1 each by Misc.(Non-Drug; Combo Route) route once daily., Disp: 30 each, Rfl: 11    blood-glucose meter kit, Use as instructed, Disp: 1 each, Rfl: 0    lancets Misc, 1 each by Misc.(Non-Drug; Combo Route) route once daily., Disp: 30 each, Rfl: 11    meloxicam (MOBIC) 7.5 MG tablet, Take 1 tablet (7.5 mg total) by mouth once daily., Disp: 15 tablet, Rfl: 0

## 2025-05-08 ENCOUNTER — PATIENT MESSAGE (OUTPATIENT)
Dept: INTERNAL MEDICINE | Facility: CLINIC | Age: 60
End: 2025-05-08
Payer: MEDICAID

## 2025-05-08 DIAGNOSIS — E11.9 TYPE 2 DIABETES MELLITUS WITHOUT COMPLICATION, WITHOUT LONG-TERM CURRENT USE OF INSULIN: Primary | ICD-10-CM

## 2025-05-09 ENCOUNTER — LAB VISIT (OUTPATIENT)
Dept: LAB | Facility: HOSPITAL | Age: 60
End: 2025-05-09
Payer: MEDICAID

## 2025-05-09 DIAGNOSIS — E11.9 TYPE 2 DIABETES MELLITUS WITHOUT COMPLICATION, WITHOUT LONG-TERM CURRENT USE OF INSULIN: ICD-10-CM

## 2025-05-09 LAB
EAG (OHS): 111 MG/DL (ref 68–131)
HBA1C MFR BLD: 5.5 % (ref 4–5.6)

## 2025-05-09 PROCEDURE — 83036 HEMOGLOBIN GLYCOSYLATED A1C: CPT

## 2025-05-09 PROCEDURE — 36415 COLL VENOUS BLD VENIPUNCTURE: CPT | Mod: PO

## 2025-05-13 ENCOUNTER — OFFICE VISIT (OUTPATIENT)
Dept: INTERNAL MEDICINE | Facility: CLINIC | Age: 60
End: 2025-05-13
Payer: MEDICAID

## 2025-05-13 ENCOUNTER — HOSPITAL ENCOUNTER (OUTPATIENT)
Dept: RADIOLOGY | Facility: HOSPITAL | Age: 60
Discharge: HOME | End: 2025-05-13
Attending: NURSE PRACTITIONER
Payer: MEDICAID

## 2025-05-13 ENCOUNTER — TELEPHONE (OUTPATIENT)
Dept: INTERNAL MEDICINE | Facility: CLINIC | Age: 60
End: 2025-05-13
Payer: MEDICAID

## 2025-05-13 VITALS
HEART RATE: 82 BPM | TEMPERATURE: 97 F | HEIGHT: 65 IN | DIASTOLIC BLOOD PRESSURE: 82 MMHG | BODY MASS INDEX: 34.63 KG/M2 | RESPIRATION RATE: 18 BRPM | WEIGHT: 207.88 LBS | OXYGEN SATURATION: 95 % | SYSTOLIC BLOOD PRESSURE: 136 MMHG

## 2025-05-13 DIAGNOSIS — E78.49 OTHER HYPERLIPIDEMIA: ICD-10-CM

## 2025-05-13 DIAGNOSIS — Z13.6 ENCOUNTER FOR SCREENING FOR CARDIOVASCULAR DISORDERS: ICD-10-CM

## 2025-05-13 DIAGNOSIS — D64.9 ANEMIA, UNSPECIFIED TYPE: ICD-10-CM

## 2025-05-13 DIAGNOSIS — N18.31 STAGE 3A CHRONIC KIDNEY DISEASE: ICD-10-CM

## 2025-05-13 DIAGNOSIS — Z91.89 AT RISK FOR CARDIOVASCULAR EVENT: ICD-10-CM

## 2025-05-13 DIAGNOSIS — I10 ESSENTIAL HYPERTENSION: ICD-10-CM

## 2025-05-13 DIAGNOSIS — E11.9 TYPE 2 DIABETES MELLITUS WITHOUT COMPLICATION, WITHOUT LONG-TERM CURRENT USE OF INSULIN: ICD-10-CM

## 2025-05-13 DIAGNOSIS — Z11.4 SCREENING FOR HIV (HUMAN IMMUNODEFICIENCY VIRUS): Primary | ICD-10-CM

## 2025-05-13 DIAGNOSIS — E11.9 TYPE 2 DIABETES MELLITUS WITHOUT COMPLICATION, WITHOUT LONG-TERM CURRENT USE OF INSULIN: Primary | ICD-10-CM

## 2025-05-13 PROCEDURE — 1159F MED LIST DOCD IN RCRD: CPT | Mod: CPTII,,, | Performed by: INTERNAL MEDICINE

## 2025-05-13 PROCEDURE — 3075F SYST BP GE 130 - 139MM HG: CPT | Mod: CPTII,,, | Performed by: INTERNAL MEDICINE

## 2025-05-13 PROCEDURE — 99214 OFFICE O/P EST MOD 30 MIN: CPT | Mod: PBBFAC,25,PO | Performed by: INTERNAL MEDICINE

## 2025-05-13 PROCEDURE — 3079F DIAST BP 80-89 MM HG: CPT | Mod: CPTII,,, | Performed by: INTERNAL MEDICINE

## 2025-05-13 PROCEDURE — 3061F NEG MICROALBUMINURIA REV: CPT | Mod: CPTII,,, | Performed by: INTERNAL MEDICINE

## 2025-05-13 PROCEDURE — 3008F BODY MASS INDEX DOCD: CPT | Mod: CPTII,,, | Performed by: INTERNAL MEDICINE

## 2025-05-13 PROCEDURE — 1160F RVW MEDS BY RX/DR IN RCRD: CPT | Mod: CPTII,,, | Performed by: INTERNAL MEDICINE

## 2025-05-13 PROCEDURE — 75571 CT HRT W/O DYE W/CA TEST: CPT | Mod: TC

## 2025-05-13 PROCEDURE — 3044F HG A1C LEVEL LT 7.0%: CPT | Mod: CPTII,,, | Performed by: INTERNAL MEDICINE

## 2025-05-13 PROCEDURE — 99999 PR PBB SHADOW E&M-EST. PATIENT-LVL IV: CPT | Mod: PBBFAC,,, | Performed by: INTERNAL MEDICINE

## 2025-05-13 PROCEDURE — 99214 OFFICE O/P EST MOD 30 MIN: CPT | Mod: S$PBB,,, | Performed by: INTERNAL MEDICINE

## 2025-05-13 PROCEDURE — 3066F NEPHROPATHY DOC TX: CPT | Mod: CPTII,,, | Performed by: INTERNAL MEDICINE

## 2025-05-13 NOTE — PROGRESS NOTES
Subjective:       Patient ID: Hodan Hair is a 59 y.o. female.    Chief Complaint: Follow-up (5 mo)    History of Present Illness    Hodan presents today for follow up of active medical conditions which include type 2 diabetes mellitus, hypertension, chronic kidney disease, obesity.  The patient has noted significant improvement in her chronic left shoulder pain following physical therapy.  She is continuing exercises at home as prescribed.  The patient is taking carvedilol and amlodipine for management of hypertension.  She is not experiencing any medication side effects.  Hypothyroidism is managed with levothyroxine.  No symptoms of palpitations or tremor have been noted.    DIABETES MANAGEMENT:  She has been on Mounjaro 7.5mg since December, noting decreased effectiveness towards the end of the week, particularly on weekends. She denies nausea and reports resolution of previous diarrhea despite continuing Metformin. She expresses satisfaction with her current A1C levels.    SLEEP DISORDERS:  She reports difficulty with both sleep initiation and morning awakening. Previous trials of prescribed sleep medications were unsuccessful. She attempted to use her daughter's sleep medication, initially at half dose without effect, then at full dose resulting in excessive sleep duration of 14 hours. She has since discontinued all sleep medications.    ALLERGIC RHINITIS:  She reports severe nasal congestion with thick mucus that cannot be cleared through blowing or rinsing, requiring manual removal with cotton swabs. She manages symptoms with as-needed OTC antihistamines, occasional Afrin nasal spray for severe nighttime symptoms, and regular saline nasal irrigation.    PSYCHOSOCIAL:  She reports fatigue and stress from caring for her mother with Alzheimer's disease and managing difficult family dynamics. She also expresses anxiety regarding her adult child's first solo flight, particularly due to the child's history of  anger management issues when nervous.  The patient continues personal use of Lexapro and Vyvanse as prescribed by her psychiatrist.      ROS:  Constitutional: +fatigue, +dizziness, +difficulty standing up  ENT: +nasal congestion, +sense of lump/mass in throat when swallowing  Cardiovascular: +orthostatic symptoms  Integumentary: +skin lesion  Neurological: +difficulty falling asleep              Physical Exam  Vitals and nursing note reviewed.   Constitutional:       General: She is not in acute distress.     Appearance: Normal appearance. She is well-developed.   HENT:      Head: Normocephalic and atraumatic.   Eyes:      General: No scleral icterus.     Extraocular Movements: Extraocular movements intact.      Conjunctiva/sclera: Conjunctivae normal.   Neck:      Thyroid: No thyromegaly.      Vascular: No JVD.   Cardiovascular:      Rate and Rhythm: Normal rate and regular rhythm.      Heart sounds: Normal heart sounds. No murmur heard.     No friction rub. No gallop.   Pulmonary:      Effort: Pulmonary effort is normal. No respiratory distress.      Breath sounds: Normal breath sounds. No wheezing or rales.   Abdominal:      General: Bowel sounds are normal.      Palpations: Abdomen is soft. There is no mass.      Tenderness: There is no abdominal tenderness.   Musculoskeletal:         General: No tenderness. Normal range of motion.      Cervical back: Normal range of motion and neck supple.   Lymphadenopathy:      Cervical: No cervical adenopathy.   Skin:     General: Skin is warm and dry.      Findings: No rash.      Comments: No foot lesions are present.   Neurological:      Mental Status: She is alert and oriented to person, place, and time.      Cranial Nerves: No cranial nerve deficit.      Comments: Sensory exam is intact in both feet on monofilament testing.   Psychiatric:         Mood and Affect: Mood normal.         Behavior: Behavior normal.         Protective Sensation (w/ 10 gram  monofilament):  Right: Intact  Left: Intact    Visual Inspection:  Normal -  Bilateral    Pedal Pulses:   Right: Present  Left: Present    Posterior Tibialis Pulses:   Right:Present  Left: Present    Lab Visit on 05/13/2025   Component Date Value Ref Range Status    Follicle Stimulating Hormone 05/13/2025 60.16  See Text mIU/mL Final    Female Reference Ranges:  Follicular Phase.................3.03-8.08 mIU/mL  Midcycle Peak....................2.55-16.69 mIU/mL  Luteal Phase.....................1.38-5.47 mIU/mL  Postmenopausal...................26..41 mIU/mL  Male Reference Range:............0.95-11.95 mIU/mL      Estradiol Level 05/13/2025 <10  See comment pg/mL Final    Estradiol Reference Ranges (Female):  Follicular phase:  pg/mL  Midcycle:          pg/mL  Luteal phase:      pg/mL  Post-menopausal(Not on HRT): <10-28 pg/mL  Post-menopausal(On HRT): < pg/mL  Males: 11-44 pg/mL  The drug Fulvestrant (Faslodex) may interfere with the   assay leading to falsely elevated Estradiol results.  Patients treated with Mifepristone should not be tested with  the  or Alinity I Estradiol assay for up to two   weeks due to the interference of the drug in this assay.    Ferritin 05/13/2025 10.0 (L)  20.0 - 300.0 ng/mL Final    Vitamin B12 05/13/2025 386  210 - 950 pg/mL Final    Iron Level 05/13/2025 51  30 - 160 ug/dL Final    Transferrin 05/13/2025 353  200 - 375 mg/dL Final    Iron Binding Capacity Total 05/13/2025 522 (H)  250 - 450 ug/dL Final    Iron Saturation 05/13/2025 10 (L)  20 - 50 % Final   Lab Visit on 05/09/2025   Component Date Value Ref Range Status    Hemoglobin A1c 05/09/2025 5.5  4.0 - 5.6 % Final    ADA Screening Guidelines:  5.7-6.4%  Consistent with prediabetes  >=6.5%  Consistent with diabetes    High levels of fetal hemoglobin interfere with the HbA1C  assay. Heterozygous hemoglobin variants (HbS, HgC, etc)do  not significantly interfere with this assay.    However, presence of multiple variants may affect accuracy.    Estimated Average Glucose 05/09/2025 111  68 - 131 mg/dL Final   Lab Visit on 05/06/2025   Component Date Value Ref Range Status    Urine Microalbumin 05/06/2025 41.0    ug/mL Final    Urine Creatinine 05/06/2025 254.0  15.0 - 325.0 mg/dL Final    Microalbumin/Creatinine Ratio Urine 05/06/2025 16.1  <=30.0 ug/mg Final   Lab Visit on 05/06/2025   Component Date Value Ref Range Status    Sodium 05/06/2025 138  136 - 145 mmol/L Final    Potassium 05/06/2025 3.9  3.5 - 5.1 mmol/L Final    Chloride 05/06/2025 104  95 - 110 mmol/L Final    CO2 05/06/2025 26  23 - 29 mmol/L Final    Glucose 05/06/2025 89  70 - 110 mg/dL Final    BUN 05/06/2025 19  6 - 20 mg/dL Final    Creatinine 05/06/2025 1.2  0.5 - 1.4 mg/dL Final    Calcium 05/06/2025 9.1  8.7 - 10.5 mg/dL Final    Protein Total 05/06/2025 7.3  6.0 - 8.4 gm/dL Final    Albumin 05/06/2025 3.5  3.5 - 5.2 g/dL Final    Bilirubin Total 05/06/2025 0.3  0.1 - 1.0 mg/dL Final    For infants and newborns, interpretation of results should be based   on gestational age, weight and in agreement with clinical   observations.    Premature Infant recommended reference ranges:   0-24 hours:  <8.0 mg/dL   24-48 hours: <12.0 mg/dL   3-5 days:    <15.0 mg/dL   6-29 days:   <15.0 mg/dL    ALP 05/06/2025 104  40 - 150 unit/L Final    AST 05/06/2025 22  11 - 45 unit/L Final    ALT 05/06/2025 18  10 - 44 unit/L Final    Anion Gap 05/06/2025 8  8 - 16 mmol/L Final    eGFR 05/06/2025 52 (L)  >60 mL/min/1.73/m2 Final    Estimated GFR calculated using the CKD-EPI creatinine (2021) equation.    Cholesterol Total 05/06/2025 181  120 - 199 mg/dL Final    The National Cholesterol Education Program (NCEP) has set the  following guidelines (reference ranges) for Cholesterol:  Optimal.....................<200 mg/dL  Borderline High.............200-239 mg/dL  High........................> or = 240 mg/dL    Triglyceride 05/06/2025 254 (H)   30 - 150 mg/dL Final    The National Cholesterol Education Program (NCEP) has set the  following guidelines (reference values) for triglycerides:  Normal......................<150 mg/dL  Borderline High.............150-199 mg/dL  High........................200-499 mg/dL    HDL Cholesterol 05/06/2025 46  40 - 75 mg/dL Final    The National Cholesterol Education Program (NCEP) has set the   following guidelines (reference values) for HDL Cholesterol:   Low...............<40 mg/dL   Optimal...........>60 mg/dL    LDL Cholesterol 05/06/2025 84.2  63.0 - 159.0 mg/dL Final    The National Cholesterol Education Program (NCEP) has set the  following guidelines (reference values) for LDL Cholesterol:  Optimal.......................<130 mg/dL  Borderline High...............130-159 mg/dL  High..........................160-189 mg/dL  Very High.....................>190 mg/dL  LDL calculated using the Friedewald equation.    HDL/Cholesterol Ratio 05/06/2025 25.4  20.0 - 50.0 % Final    Cholesterol/HDL Ratio 05/06/2025 3.9  2.0 - 5.0 Final    Non HDL Cholesterol 05/06/2025 135  mg/dL Final    Risk category and Non-HDL cholesterol goals:  Coronary heart disease (CHD)or equivalent (10-year risk of CHD >20%):  Non-HDL cholesterol goal     <130 mg/dL  Two or more CHD risk factors and 10-year risk of CHD <= 20%:  Non-HDL cholesterol goal     <160 mg/dL  0 to 1 CHD risk factor:  Non-HDL cholesterol goal     <190 mg/dL    TSH 05/06/2025 0.968  0.400 - 4.000 uIU/mL Final    WBC 05/06/2025 8.12  3.90 - 12.70 K/uL Final    RBC 05/06/2025 4.08  4.00 - 5.40 M/uL Final    HGB 05/06/2025 10.4 (L)  12.0 - 16.0 gm/dL Final    HCT 05/06/2025 34.5 (L)  37.0 - 48.5 % Final    MCV 05/06/2025 85  82 - 98 fL Final    MCH 05/06/2025 25.5 (L)  27.0 - 31.0 pg Final    MCHC 05/06/2025 30.1 (L)  32.0 - 36.0 g/dL Final    RDW 05/06/2025 13.8  11.5 - 14.5 % Final    Platelet Count 05/06/2025 334  150 - 450 K/uL Final    MPV 05/06/2025 8.8 (L)  9.2 - 12.9 fL  Final    Nucleated RBC 05/06/2025 0  <=0 /100 WBC Final    Neut % 05/06/2025 59.4  38 - 73 % Final    Lymph % 05/06/2025 28.8  18 - 48 % Final    Mono % 05/06/2025 7.8  4 - 15 % Final    Eos % 05/06/2025 3.3  <=8 % Final    Basophil % 05/06/2025 0.5  <=1.9 % Final    Imm Grans % 05/06/2025 0.2  0.0 - 0.5 % Final    Neut # 05/06/2025 4.82  1.8 - 7.7 K/uL Final    Lymph # 05/06/2025 2.34  1 - 4.8 K/uL Final    Mono # 05/06/2025 0.63  0.3 - 1 K/uL Final    Eos # 05/06/2025 0.27  <=0.5 K/uL Final    Baso # 05/06/2025 0.04  <=0.2 K/uL Final    Imm Grans # 05/06/2025 0.02  0.00 - 0.04 K/uL Final    Mild elevation in immature granulocytes is non specific and can be seen in a variety of conditions including stress response, acute inflammation, trauma and pregnancy. Correlation with other laboratory and clinical findings is essential.       Assessment & Plan:     Assessment & Plan     Assessed response to Mounjaro 7.5 mg, noting good A1C control and weight loss.   Increased Mounjaro dose from 7.5 mg to 10 mg weekly injections due to reported decreased efficacy towards end of weekly dosing cycle.   Renal function improved, likely related to weight loss.   Iron deficiency anemia potentially present based on lab results and symptoms (fatigue, ice cravings).   Assessed allergy symptoms and current management.    TYPE 2 DIABETES MELLITUS WITH DIABETIC CHRONIC KIDNEY DISEASE:   Monitored the patient's glucose level, which was 89 on the day of the test.   A1C is excellent at 5.5%.   Continued Metformin at current dose as it is beneficial for diabetes management.   Noted improvement in kidney function related to diabetes, with GFR value now at 52, which is an improvement from last year's values of 43 and 47.    OBESITY, CLASS 2:   Monitored the patient's weight loss of 11 lbs, indicating progress in managing obesity and a change in BMI.   Considered increasing Mounjaro dosage from 7.5 mg to 10 mg to further aid in weight loss.    Discussed the positive impact of weight loss on overall health, particularly noting the improvement in kidney function.    STAGE 3A CHRONIC KIDNEY DISEASE:   Evaluated kidney function improvement as evidenced by decreased creatinine level and increased GFR to 52 from previous values of 43-47.   Explained the direct relationship between the patient's weight loss and improved renal function, emphasizing the importance of continuing weight management efforts to support kidney health.    INSOMNIA:   Hodan reports trouble going to sleep and waking up early, unable to return to sleep.   Sleep medication previously prescribed by psychiatrist and another physician was ineffective.    ALLERGIC RHINITIS:   Hodan experiences thick mucus in the nose and throat.   Uses saline and cotton-tipped applicators for relief, with occasional use of [facility] generic antihistamine and oxymetazoline for severe symptoms.    IRON DEFICIENCY ANEMIA:   Hodan reports fatigue and increased desire for ice, possibly related to iron deficiency.   Will add iron level check to non-fasting labs scheduled for this afternoon by her gynecologist to evaluate these symptoms.    GASTROESOPHAGEAL REFLUX DISEASE:   Continue Pantoprazole nightly to manage acid reflux symptoms.    FAMILY HISTORY OF MENTAL HEALTH ISSUES:   Addressed patient's concern regarding mother's Alzheimer's disease and sister's narcissism, which is causing family stress and increasing care needs.    PHYSICAL ACTIVITY AND EXERCISE:   Hodan to continue Pilates twice weekly, maintain physical activities with new puppy, and continue preventative shoulder exercises learned from physical therapy.         Follow up in about 5 months (around 10/13/2025).     Nilson Bahena MD

## 2025-05-19 ENCOUNTER — TELEPHONE (OUTPATIENT)
Dept: OBSTETRICS AND GYNECOLOGY | Facility: CLINIC | Age: 60
End: 2025-05-19
Payer: MEDICAID

## 2025-05-19 DIAGNOSIS — Z91.89 AT HIGH RISK FOR CARDIOVASCULAR DISEASE: Primary | ICD-10-CM

## 2025-05-19 NOTE — TELEPHONE ENCOUNTER
Contact with patient, CT Calcium score: 172. Referral to Cardiology. Not a candidate for HRT at this time.

## 2025-05-23 ENCOUNTER — HOSPITAL ENCOUNTER (OUTPATIENT)
Dept: RADIOLOGY | Facility: HOSPITAL | Age: 60
Discharge: HOME | End: 2025-05-23
Attending: NURSE PRACTITIONER
Payer: MEDICAID

## 2025-05-23 DIAGNOSIS — Z13.820 SCREENING FOR OSTEOPOROSIS: ICD-10-CM

## 2025-05-23 PROCEDURE — 77080 DXA BONE DENSITY AXIAL: CPT | Mod: TC

## 2025-05-23 PROCEDURE — 77080 DXA BONE DENSITY AXIAL: CPT | Mod: 26,,, | Performed by: INTERNAL MEDICINE

## 2025-05-28 ENCOUNTER — PATIENT MESSAGE (OUTPATIENT)
Dept: INTERNAL MEDICINE | Facility: CLINIC | Age: 60
End: 2025-05-28
Payer: MEDICAID

## 2025-05-28 DIAGNOSIS — D50.9 IRON DEFICIENCY ANEMIA, UNSPECIFIED IRON DEFICIENCY ANEMIA TYPE: Primary | ICD-10-CM

## 2025-05-28 NOTE — TELEPHONE ENCOUNTER
The patient's follow-up blood testing for iron confirms the presence of iron-deficiency.  This is likely causing her mild anemia.  Iron-deficiency in an adult also prompts evaluation of the GI tract for possible causes of silent or occult blood loss leading to iron-deficiency.      Colonoscopy and upper endoscopy are now recommended for further evaluation of the patient's iron-deficiency anemia.  Referral orders have been entered.    (The patient has never had a colonoscopy.  She has not returned Fit kit specimen for occult blood testing for colon cancer screening).

## 2025-05-29 ENCOUNTER — RESULTS FOLLOW-UP (OUTPATIENT)
Dept: OBSTETRICS AND GYNECOLOGY | Facility: CLINIC | Age: 60
End: 2025-05-29

## 2025-06-04 ENCOUNTER — TELEPHONE (OUTPATIENT)
Dept: ENDOSCOPY | Facility: HOSPITAL | Age: 60
End: 2025-06-04
Payer: MEDICAID

## 2025-06-04 ENCOUNTER — CLINICAL SUPPORT (OUTPATIENT)
Dept: ENDOSCOPY | Facility: HOSPITAL | Age: 60
End: 2025-06-04
Attending: INTERNAL MEDICINE
Payer: MEDICAID

## 2025-06-04 DIAGNOSIS — D50.9 IRON DEFICIENCY ANEMIA, UNSPECIFIED IRON DEFICIENCY ANEMIA TYPE: ICD-10-CM

## 2025-06-23 ENCOUNTER — CLINICAL SUPPORT (OUTPATIENT)
Dept: ENDOSCOPY | Facility: HOSPITAL | Age: 60
End: 2025-06-23
Attending: INTERNAL MEDICINE
Payer: MEDICAID

## 2025-06-23 ENCOUNTER — TELEPHONE (OUTPATIENT)
Dept: ENDOSCOPY | Facility: HOSPITAL | Age: 60
End: 2025-06-23

## 2025-06-23 VITALS — WEIGHT: 208 LBS | BODY MASS INDEX: 34.66 KG/M2 | HEIGHT: 65 IN

## 2025-06-23 DIAGNOSIS — Z12.11 SPECIAL SCREENING FOR MALIGNANT NEOPLASMS, COLON: Primary | ICD-10-CM

## 2025-06-23 DIAGNOSIS — D50.9 IRON DEFICIENCY ANEMIA, UNSPECIFIED IRON DEFICIENCY ANEMIA TYPE: ICD-10-CM

## 2025-06-23 RX ORDER — SOD SULF/POT CHLORIDE/MAG SULF 1.479 G
12 TABLET ORAL DAILY
Qty: 24 TABLET | Refills: 0 | Status: SHIPPED | OUTPATIENT
Start: 2025-06-23

## 2025-06-23 NOTE — TELEPHONE ENCOUNTER
Patient is scheduled for a Colonoscopy on 8/25/25 with Dr. Ceron  Referral for procedure from PAT appointment

## 2025-06-30 ENCOUNTER — ANESTHESIA EVENT (OUTPATIENT)
Dept: ENDOSCOPY | Facility: HOSPITAL | Age: 60
End: 2025-06-30
Payer: MEDICAID

## 2025-07-23 RX ORDER — METFORMIN HYDROCHLORIDE 500 MG/1
500 TABLET ORAL 2 TIMES DAILY WITH MEALS
Qty: 180 TABLET | Refills: 1 | Status: SHIPPED | OUTPATIENT
Start: 2025-07-23

## 2025-07-23 NOTE — TELEPHONE ENCOUNTER
Refill Decision Note   Hodan Hair  is requesting a refill authorization.  Brief Assessment and Rationale for Refill:  Approve     Medication Therapy Plan:         Comments:     Note composed:11:48 AM 07/23/2025

## 2025-07-23 NOTE — TELEPHONE ENCOUNTER
No care due was identified.  [facility] Embedded Care Due Messages. Reference number: [id number].   7/23/2025 8:02:46 AM CDT

## 2025-07-28 RX ORDER — METFORMIN HYDROCHLORIDE 500 MG/1
500 TABLET ORAL 2 TIMES DAILY WITH MEALS
Qty: 180 TABLET | Refills: 1 | OUTPATIENT
Start: 2025-07-28

## 2025-07-28 NOTE — TELEPHONE ENCOUNTER
No care due was identified.  [facility] Embedded Care Due Messages. Reference number: [id number].   7/28/2025 9:36:28 AM CDT

## 2025-07-29 NOTE — TELEPHONE ENCOUNTER
Refill Decision Note   Hodan Hair  is requesting a refill authorization.  Brief Assessment and Rationale for Refill:  Quick Discontinue     Medication Therapy Plan:  Receipt confirmed by pharmacy (7/23/2025 11:57 AM CDT)      Comments:     Note composed:11:51 PM 07/28/2025

## 2025-07-30 RX ORDER — LEVOTHYROXINE SODIUM 112 UG/1
112 TABLET ORAL
Qty: 90 TABLET | Refills: 3 | Status: SHIPPED | OUTPATIENT
Start: 2025-07-30

## 2025-07-30 NOTE — TELEPHONE ENCOUNTER
No care due was identified.  [facility] Embedded Care Due Messages. Reference number: [id number].   7/30/2025 9:00:14 AM CDT

## 2025-07-30 NOTE — TELEPHONE ENCOUNTER
Refill Decision Note   Hodan Hair  is requesting a refill authorization.  Brief Assessment and Rationale for Refill:  Approve     Medication Therapy Plan:         Comments:     Note composed:2:03 PM 07/30/2025

## 2025-08-04 ENCOUNTER — OFFICE VISIT (OUTPATIENT)
Dept: CARDIOLOGY | Facility: CLINIC | Age: 60
End: 2025-08-04
Payer: MEDICAID

## 2025-08-04 ENCOUNTER — PATIENT MESSAGE (OUTPATIENT)
Dept: OBSTETRICS AND GYNECOLOGY | Facility: CLINIC | Age: 60
End: 2025-08-04
Payer: MEDICAID

## 2025-08-04 VITALS
OXYGEN SATURATION: 99 % | BODY MASS INDEX: 33.53 KG/M2 | HEART RATE: 76 BPM | DIASTOLIC BLOOD PRESSURE: 76 MMHG | WEIGHT: 201.25 LBS | HEIGHT: 65 IN | SYSTOLIC BLOOD PRESSURE: 131 MMHG

## 2025-08-04 DIAGNOSIS — Z91.89 AT HIGH RISK FOR CARDIOVASCULAR DISEASE: ICD-10-CM

## 2025-08-04 DIAGNOSIS — R93.1 HIGH CORONARY ARTERY CALCIUM SCORE: ICD-10-CM

## 2025-08-04 DIAGNOSIS — I10 PRIMARY HYPERTENSION: Primary | ICD-10-CM

## 2025-08-04 PROCEDURE — 3008F BODY MASS INDEX DOCD: CPT | Mod: CPTII,,, | Performed by: INTERNAL MEDICINE

## 2025-08-04 PROCEDURE — 99213 OFFICE O/P EST LOW 20 MIN: CPT | Mod: PBBFAC,PN | Performed by: INTERNAL MEDICINE

## 2025-08-04 PROCEDURE — 99204 OFFICE O/P NEW MOD 45 MIN: CPT | Mod: S$PBB,,, | Performed by: INTERNAL MEDICINE

## 2025-08-04 PROCEDURE — 3075F SYST BP GE 130 - 139MM HG: CPT | Mod: CPTII,,, | Performed by: INTERNAL MEDICINE

## 2025-08-04 PROCEDURE — 1160F RVW MEDS BY RX/DR IN RCRD: CPT | Mod: CPTII,,, | Performed by: INTERNAL MEDICINE

## 2025-08-04 PROCEDURE — 3078F DIAST BP <80 MM HG: CPT | Mod: CPTII,,, | Performed by: INTERNAL MEDICINE

## 2025-08-04 PROCEDURE — 3061F NEG MICROALBUMINURIA REV: CPT | Mod: CPTII,,, | Performed by: INTERNAL MEDICINE

## 2025-08-04 PROCEDURE — 3066F NEPHROPATHY DOC TX: CPT | Mod: CPTII,,, | Performed by: INTERNAL MEDICINE

## 2025-08-04 PROCEDURE — 1159F MED LIST DOCD IN RCRD: CPT | Mod: CPTII,,, | Performed by: INTERNAL MEDICINE

## 2025-08-04 PROCEDURE — 99999 PR PBB SHADOW E&M-EST. PATIENT-LVL III: CPT | Mod: PBBFAC,,, | Performed by: INTERNAL MEDICINE

## 2025-08-04 PROCEDURE — 3044F HG A1C LEVEL LT 7.0%: CPT | Mod: CPTII,,, | Performed by: INTERNAL MEDICINE

## 2025-08-04 RX ORDER — EZETIMIBE AND SIMVASTATIN 10; 20 MG/1; MG/1
1 TABLET ORAL NIGHTLY
Qty: 90 TABLET | Refills: 3 | Status: SHIPPED | OUTPATIENT
Start: 2025-08-04 | End: 2026-08-04

## 2025-08-04 NOTE — PROGRESS NOTES
"[facility] [address]     SUBJECTIVE:     History of Present Illness:  Patient is a 59 y.o. female presents with higher calcium score and here for evaluation. Wanted to start on hormone replacement therapy    Primary Diagnosis:   Hypertension: positive  DM: positive  Smoker: quit 28 years ago  Family history of early CAD: none  Heart disease: none   Mother with breast cancer at age 80's   ROS  No chest pains  No shortness of breath; no PND or orthopnea  No palpitations  No syncope  Blood pressures normal at home  Activity: palates; chases puppy  Review of patient's allergies indicates:   Allergen Reactions    No known drug allergies        Past Medical History:   Diagnosis Date    ADHD (attention deficit hyperactivity disorder)     Anxiety disorder     Hyperlipidemia     Hypertension     Hypothyroid     Menopause     Type 2 diabetes mellitus without complications        Past Surgical History:   Procedure Laterality Date    BREAST BIOPSY Right 2020    Rare breast ductular elements with adipose    BREAST BIOPSY Right 06/10/2020    Chronically inflamed fibroadipose tissue with rare breast ducts and abundant      SECTION      CHOLECYSTECTOMY      ENDOMETRIAL ABLATION  2016    TRIGGER FINGER RELEASE      WISDOM TOOTH EXTRACTION             Past Hospitalization:         Cardiac meds:    Amlodipine 5 mg  Carvedilol 25 mg BID  Vytorin 10/10 - for years   [facility]       OBJECTIVE:     Vital Signs (Most Recent)  Vitals:    25 1501   BP: 131/76   Pulse: 76   SpO2: 99%   Weight: 91.3 kg (201 lb 4.5 oz)   Height: 5' 5" (1.651 m)         Physical Exam:  Neck: normal carotids, no bruits; normal JVP  Lungs :clear  Heart: RR, normal S1,S2, no murmurs, no gallops  Abd: no masses; no bruits;   Exts: normal DP and PT pulses bilaterally, normal radials; no edema noted               Labs  : LDL 84, ; Hgb 10.4, LFT's normal; GFR 52; TSH normal, A1c 5.5    Diagnostic Results:    1.EK: normal,  2.Echo: "   3. Stress test  4. cath  5. CT score: calcium: 172   Chart review:        ASSESSMENT/PLAN:     No cardiac symptoms  Multiple risk factors being controlled  Calcium score: 172: she is on vytorin and may consider inreasing the simvastatin dose to 20 to drive the LDL to less than 70  With calcium in coronaries; history of breast cancer in mother though elderly, no symptoms of menopause, see no reason for hormone replacement therapy  5. Abnormal GFR - uses NSAID's and needs to stop  6. High TG's     Plan: 1. Diet changes to reduce TG  2. Increase vytorin to 10/20 daily  3. Stop the NSAID's   4. Return as needed     Eveline Alves MD

## 2025-08-06 ENCOUNTER — TELEPHONE (OUTPATIENT)
Dept: CARDIOLOGY | Facility: CLINIC | Age: 60
End: 2025-08-06
Payer: MEDICAID

## 2025-08-06 NOTE — TELEPHONE ENCOUNTER
Documents requested for PA faxed to insurance company.    PA approved for ezetimibe-simvastatin 10mg-20mg for 30 day supply.  PA scanned in to media.    PA faxed to [facility].

## 2025-08-11 ENCOUNTER — OFFICE VISIT (OUTPATIENT)
Dept: OBSTETRICS AND GYNECOLOGY | Facility: CLINIC | Age: 60
End: 2025-08-11
Payer: MEDICAID

## 2025-08-11 VITALS
DIASTOLIC BLOOD PRESSURE: 80 MMHG | SYSTOLIC BLOOD PRESSURE: 160 MMHG | HEIGHT: 65 IN | BODY MASS INDEX: 33.76 KG/M2 | WEIGHT: 202.63 LBS

## 2025-08-11 DIAGNOSIS — Z12.31 ENCOUNTER FOR SCREENING MAMMOGRAM FOR BREAST CANCER: ICD-10-CM

## 2025-08-11 DIAGNOSIS — Z12.4 SCREENING FOR CERVICAL CANCER: Primary | ICD-10-CM

## 2025-08-11 DIAGNOSIS — Z11.51 SCREENING FOR HPV (HUMAN PAPILLOMAVIRUS): ICD-10-CM

## 2025-08-11 PROCEDURE — 87624 HPV HI-RISK TYP POOLED RSLT: CPT | Performed by: OBSTETRICS & GYNECOLOGY

## 2025-08-11 PROCEDURE — 3066F NEPHROPATHY DOC TX: CPT | Mod: CPTII,,, | Performed by: OBSTETRICS & GYNECOLOGY

## 2025-08-11 PROCEDURE — 3079F DIAST BP 80-89 MM HG: CPT | Mod: CPTII,,, | Performed by: OBSTETRICS & GYNECOLOGY

## 2025-08-11 PROCEDURE — 88175 CYTOPATH C/V AUTO FLUID REDO: CPT | Mod: TC | Performed by: OBSTETRICS & GYNECOLOGY

## 2025-08-11 PROCEDURE — 3044F HG A1C LEVEL LT 7.0%: CPT | Mod: CPTII,,, | Performed by: OBSTETRICS & GYNECOLOGY

## 2025-08-11 PROCEDURE — 3061F NEG MICROALBUMINURIA REV: CPT | Mod: CPTII,,, | Performed by: OBSTETRICS & GYNECOLOGY

## 2025-08-11 PROCEDURE — 99396 PREV VISIT EST AGE 40-64: CPT | Mod: S$PBB,,, | Performed by: OBSTETRICS & GYNECOLOGY

## 2025-08-11 PROCEDURE — 3008F BODY MASS INDEX DOCD: CPT | Mod: CPTII,,, | Performed by: OBSTETRICS & GYNECOLOGY

## 2025-08-11 PROCEDURE — 99213 OFFICE O/P EST LOW 20 MIN: CPT | Mod: PBBFAC | Performed by: OBSTETRICS & GYNECOLOGY

## 2025-08-11 PROCEDURE — 3077F SYST BP >= 140 MM HG: CPT | Mod: CPTII,,, | Performed by: OBSTETRICS & GYNECOLOGY

## 2025-08-11 PROCEDURE — 99999 PR PBB SHADOW E&M-EST. PATIENT-LVL III: CPT | Mod: PBBFAC,,, | Performed by: OBSTETRICS & GYNECOLOGY

## 2025-08-11 RX ORDER — EZETIMIBE AND SIMVASTATIN 10; 10 MG/1; MG/1
1 TABLET ORAL NIGHTLY
COMMUNITY
Start: 2025-08-04

## 2025-08-12 LAB
LEFT EYE DM RETINOPATHY: NEGATIVE
RIGHT EYE DM RETINOPATHY: NEGATIVE

## 2025-08-14 LAB
INSULIN SERPL-ACNC: NORMAL U[IU]/ML
LAB AP BETHESDA CATEGORY: NORMAL
LAB AP CLINICAL FINDINGS: NORMAL
LAB AP CONTRACEPTIVES: NORMAL
LAB AP GYN ADDITIONAL FINDINGS: NORMAL
LAB AP OCHS PAP SPECIMEN ADEQUACY: NORMAL
LAB AP OHS PAP INTERPRETATION: NORMAL
LAB AP PAP DISCLAIMER COMMENTS: NORMAL
LAB AP PAP ESTROGEN REPLACEMENT THERAPY: NORMAL
LAB AP PAP PMP: NORMAL
LAB AP PAP PREVIOUS BX: NORMAL
LAB AP PAP PRIOR TREATMENT: NORMAL
LAB AP PERFORMING LOCATION(S): NORMAL

## 2025-08-15 LAB
HPV DNA, HIGH RISK TYPE 16, PCR (OHS): NEGATIVE
HPV DNA, HIGH RISK TYPE 18, PCR (OHS): NEGATIVE
HPV DNA, HIGH RISK TYPE OTHER, PCR (OHS): NEGATIVE

## 2025-08-20 RX ORDER — AMLODIPINE BESYLATE 5 MG/1
5 TABLET ORAL NIGHTLY
Qty: 90 TABLET | Refills: 2 | Status: SHIPPED | OUTPATIENT
Start: 2025-08-20

## 2025-08-22 ENCOUNTER — TELEPHONE (OUTPATIENT)
Dept: ENDOSCOPY | Facility: HOSPITAL | Age: 60
End: 2025-08-22
Payer: MEDICAID

## 2025-08-25 ENCOUNTER — ANESTHESIA (OUTPATIENT)
Dept: ENDOSCOPY | Facility: HOSPITAL | Age: 60
End: 2025-08-25
Payer: MEDICAID

## 2025-08-25 DIAGNOSIS — I10 HYPERTENSION, UNSPECIFIED TYPE: ICD-10-CM

## 2025-08-26 ENCOUNTER — HOSPITAL ENCOUNTER (OUTPATIENT)
Facility: HOSPITAL | Age: 60
Discharge: HOME | End: 2025-08-26
Attending: STUDENT IN AN ORGANIZED HEALTH CARE EDUCATION/TRAINING PROGRAM | Admitting: STUDENT IN AN ORGANIZED HEALTH CARE EDUCATION/TRAINING PROGRAM
Payer: MEDICAID

## 2025-08-26 PROCEDURE — 63600175 PHARM REV CODE 636 W HCPCS: Performed by: NURSE ANESTHETIST, CERTIFIED REGISTERED

## 2025-08-26 PROCEDURE — 25000003 PHARM REV CODE 250: Performed by: NURSE ANESTHETIST, CERTIFIED REGISTERED

## 2025-08-26 RX ORDER — LIDOCAINE HYDROCHLORIDE 20 MG/ML
INJECTION INTRAVENOUS
Status: DISCONTINUED | OUTPATIENT
Start: 2025-08-26 | End: 2025-08-26

## 2025-08-26 RX ORDER — FENTANYL CITRATE 50 UG/ML
INJECTION, SOLUTION INTRAMUSCULAR; INTRAVENOUS
Status: DISCONTINUED | OUTPATIENT
Start: 2025-08-26 | End: 2025-08-26

## 2025-08-26 RX ORDER — DEXMEDETOMIDINE HYDROCHLORIDE 100 UG/ML
INJECTION, SOLUTION INTRAVENOUS
Status: DISCONTINUED | OUTPATIENT
Start: 2025-08-26 | End: 2025-08-26

## 2025-08-26 RX ORDER — AMLODIPINE BESYLATE 5 MG/1
5 TABLET ORAL NIGHTLY
Qty: 90 TABLET | Refills: 3 | OUTPATIENT
Start: 2025-08-26

## 2025-08-26 RX ORDER — PROPOFOL 10 MG/ML
VIAL (ML) INTRAVENOUS CONTINUOUS PRN
Status: DISCONTINUED | OUTPATIENT
Start: 2025-08-26 | End: 2025-08-26

## 2025-08-26 RX ORDER — PROPOFOL 10 MG/ML
VIAL (ML) INTRAVENOUS
Status: DISCONTINUED | OUTPATIENT
Start: 2025-08-26 | End: 2025-08-26

## 2025-08-26 RX ORDER — GLYCOPYRROLATE 0.2 MG/ML
INJECTION INTRAMUSCULAR; INTRAVENOUS
Status: DISCONTINUED | OUTPATIENT
Start: 2025-08-26 | End: 2025-08-26

## 2025-08-26 RX ADMIN — PROPOFOL 30 MG: 10 INJECTION, EMULSION INTRAVENOUS at 08:08

## 2025-08-26 RX ADMIN — PROPOFOL 60 MG: 10 INJECTION, EMULSION INTRAVENOUS at 08:08

## 2025-08-26 RX ADMIN — PROPOFOL 150 MCG/KG/MIN: 10 INJECTION, EMULSION INTRAVENOUS at 08:08

## 2025-08-26 RX ADMIN — GLYCOPYRROLATE 0.2 MG: 0.2 INJECTION, SOLUTION INTRAMUSCULAR; INTRAVENOUS at 08:08

## 2025-08-26 RX ADMIN — LIDOCAINE HYDROCHLORIDE 100 MG: 20 INJECTION INTRAVENOUS at 08:08

## 2025-08-26 RX ADMIN — DEXMEDETOMIDINE HYDROCHLORIDE 12 MCG: 100 INJECTION, SOLUTION INTRAVENOUS at 08:08

## 2025-08-26 RX ADMIN — SODIUM CHLORIDE: 0.9 INJECTION, SOLUTION INTRAVENOUS at 08:08

## 2025-08-26 RX ADMIN — FENTANYL CITRATE 25 MCG: 50 INJECTION, SOLUTION INTRAMUSCULAR; INTRAVENOUS at 08:08

## 2025-08-27 ENCOUNTER — PATIENT OUTREACH (OUTPATIENT)
Dept: ADMINISTRATIVE | Facility: HOSPITAL | Age: 60
End: 2025-08-27
Payer: MEDICAID

## 2025-08-27 RX ORDER — CARVEDILOL 25 MG/1
25 TABLET ORAL 2 TIMES DAILY WITH MEALS
Qty: 180 TABLET | Refills: 2 | Status: SHIPPED | OUTPATIENT
Start: 2025-08-27

## 2025-08-28 ENCOUNTER — RESULTS FOLLOW-UP (OUTPATIENT)
Dept: GASTROENTEROLOGY | Facility: CLINIC | Age: 60
End: 2025-08-28
Payer: MEDICAID

## 2025-09-02 ENCOUNTER — LAB VISIT (OUTPATIENT)
Dept: LAB | Facility: HOSPITAL | Age: 60
End: 2025-09-02
Attending: INTERNAL MEDICINE
Payer: MEDICAID

## 2025-09-02 DIAGNOSIS — N18.31 STAGE 3A CHRONIC KIDNEY DISEASE: ICD-10-CM

## 2025-09-02 DIAGNOSIS — E78.49 OTHER HYPERLIPIDEMIA: ICD-10-CM

## 2025-09-02 DIAGNOSIS — E11.9 TYPE 2 DIABETES MELLITUS WITHOUT COMPLICATION, WITHOUT LONG-TERM CURRENT USE OF INSULIN: ICD-10-CM

## 2025-09-02 DIAGNOSIS — Z11.4 SCREENING FOR HIV (HUMAN IMMUNODEFICIENCY VIRUS): ICD-10-CM

## 2025-09-02 DIAGNOSIS — I10 ESSENTIAL HYPERTENSION: ICD-10-CM

## 2025-09-02 LAB
ABSOLUTE EOSINOPHIL (OHS): 0.03 K/UL
ABSOLUTE MONOCYTE (OHS): 0.64 K/UL (ref 0.3–1)
ABSOLUTE NEUTROPHIL COUNT (OHS): 5.33 K/UL (ref 1.8–7.7)
ALBUMIN SERPL BCP-MCNC: 3.7 G/DL (ref 3.5–5.2)
ALP SERPL-CCNC: 93 UNIT/L (ref 40–150)
ALT SERPL W/O P-5'-P-CCNC: 18 UNIT/L (ref 0–55)
ANION GAP (OHS): 8 MMOL/L (ref 8–16)
AST SERPL-CCNC: 26 UNIT/L (ref 0–50)
BASOPHILS # BLD AUTO: 0.04 K/UL
BASOPHILS NFR BLD AUTO: 0.5 %
BILIRUB SERPL-MCNC: 0.3 MG/DL (ref 0.1–1)
BUN SERPL-MCNC: 14 MG/DL (ref 6–20)
CALCIUM SERPL-MCNC: 9.1 MG/DL (ref 8.7–10.5)
CHLORIDE SERPL-SCNC: 106 MMOL/L (ref 95–110)
CO2 SERPL-SCNC: 25 MMOL/L (ref 23–29)
CREAT SERPL-MCNC: 1.2 MG/DL (ref 0.5–1.4)
EAG (OHS): 111 MG/DL (ref 68–131)
ERYTHROCYTE [DISTWIDTH] IN BLOOD BY AUTOMATED COUNT: 14.2 % (ref 11.5–14.5)
GFR SERPLBLD CREATININE-BSD FMLA CKD-EPI: 52 ML/MIN/1.73/M2
GLUCOSE SERPL-MCNC: 100 MG/DL (ref 70–110)
HBA1C MFR BLD: 5.5 % (ref 4–5.6)
HCT VFR BLD AUTO: 32.5 % (ref 37–48.5)
HGB BLD-MCNC: 10.4 GM/DL (ref 12–16)
HIV 1+2 AB+HIV1 P24 AG SERPL QL IA: NORMAL
IMM GRANULOCYTES # BLD AUTO: 0.02 K/UL (ref 0–0.04)
IMM GRANULOCYTES NFR BLD AUTO: 0.2 % (ref 0–0.5)
LYMPHOCYTES # BLD AUTO: 2.21 K/UL (ref 1–4.8)
MCH RBC QN AUTO: 26.3 PG (ref 27–31)
MCHC RBC AUTO-ENTMCNC: 32 G/DL (ref 32–36)
MCV RBC AUTO: 82 FL (ref 82–98)
NUCLEATED RBC (/100WBC) (OHS): 0 /100 WBC
PLATELET # BLD AUTO: 401 K/UL (ref 150–450)
PMV BLD AUTO: 8.9 FL (ref 9.2–12.9)
POTASSIUM SERPL-SCNC: 4.3 MMOL/L (ref 3.5–5.1)
PROT SERPL-MCNC: 6.8 GM/DL (ref 6–8.4)
RBC # BLD AUTO: 3.95 M/UL (ref 4–5.4)
RELATIVE EOSINOPHIL (OHS): 0.4 %
RELATIVE LYMPHOCYTE (OHS): 26.7 % (ref 18–48)
RELATIVE MONOCYTE (OHS): 7.7 % (ref 4–15)
RELATIVE NEUTROPHIL (OHS): 64.5 % (ref 38–73)
SODIUM SERPL-SCNC: 139 MMOL/L (ref 136–145)
WBC # BLD AUTO: 8.27 K/UL (ref 3.9–12.7)

## 2025-09-02 PROCEDURE — 83036 HEMOGLOBIN GLYCOSYLATED A1C: CPT

## 2025-09-02 PROCEDURE — 85025 COMPLETE CBC W/AUTO DIFF WBC: CPT

## 2025-09-02 PROCEDURE — 80053 COMPREHEN METABOLIC PANEL: CPT

## 2025-09-02 PROCEDURE — 36415 COLL VENOUS BLD VENIPUNCTURE: CPT | Mod: PO

## 2025-09-02 PROCEDURE — 87389 HIV-1 AG W/HIV-1&-2 AB AG IA: CPT
